# Patient Record
Sex: MALE | Race: WHITE | Employment: OTHER | URBAN - NONMETROPOLITAN AREA
[De-identification: names, ages, dates, MRNs, and addresses within clinical notes are randomized per-mention and may not be internally consistent; named-entity substitution may affect disease eponyms.]

---

## 2017-05-08 ENCOUNTER — HOSPITAL ENCOUNTER (OUTPATIENT)
Dept: NON INVASIVE DIAGNOSTICS | Age: 22
Discharge: HOME | End: 2017-05-08
Payer: MEDICAID

## 2017-05-08 LAB
EKG ATRIAL RATE: 94 BPM
EKG P AXIS: 55 DEGREES
EKG P-R INTERVAL: 150 MS
EKG Q-T INTERVAL: 358 MS
EKG QRS DURATION: 86 MS
EKG QTC CALCULATION (BAZETT): 447 MS
EKG R AXIS: 84 DEGREES
EKG T AXIS: 12 DEGREES
EKG VENTRICULAR RATE: 94 BPM

## 2017-05-08 PROCEDURE — 93005 ELECTROCARDIOGRAM TRACING: CPT

## 2017-05-12 ENCOUNTER — HOSPITAL ENCOUNTER (OUTPATIENT)
Age: 22
Setting detail: SPECIMEN
Discharge: HOME | End: 2017-05-12
Payer: MEDICAID

## 2017-05-12 LAB
ABSOLUTE EOS #: 0 K/UL (ref 0–0.4)
ABSOLUTE LYMPH #: 1.4 K/UL (ref 1–4.8)
ABSOLUTE MONO #: 0.6 K/UL (ref 0–1)
ANION GAP SERPL CALCULATED.3IONS-SCNC: 12 MMOL/L (ref 9–17)
BASOPHILS # BLD: 0 %
BASOPHILS ABSOLUTE: 0 K/UL (ref 0–0.2)
BUN BLDV-MCNC: 9 MG/DL (ref 6–20)
BUN/CREAT BLD: 8 (ref 9–20)
CALCIUM SERPL-MCNC: 9.4 MG/DL (ref 8.6–10.4)
CHLORIDE BLD-SCNC: 102 MMOL/L (ref 98–107)
CHOLESTEROL/HDL RATIO: 3.1
CHOLESTEROL: 132 MG/DL
CO2: 26 MMOL/L (ref 20–31)
CREAT SERPL-MCNC: 1.1 MG/DL (ref 0.7–1.2)
DIFFERENTIAL TYPE: NORMAL
EOSINOPHILS RELATIVE PERCENT: 0 %
GFR AFRICAN AMERICAN: >60 ML/MIN
GFR NON-AFRICAN AMERICAN: >60 ML/MIN
GFR SERPL CREATININE-BSD FRML MDRD: ABNORMAL ML/MIN/{1.73_M2}
GFR SERPL CREATININE-BSD FRML MDRD: ABNORMAL ML/MIN/{1.73_M2}
GLUCOSE BLD-MCNC: 86 MG/DL (ref 70–99)
HCT VFR BLD CALC: 41.8 % (ref 41–53)
HDLC SERPL-MCNC: 43 MG/DL
HEMOGLOBIN: 13.9 G/DL (ref 13.5–17)
LDL CHOLESTEROL: 72 MG/DL (ref 0–130)
LYMPHOCYTES # BLD: 27 %
MCH RBC QN AUTO: 28.1 PG (ref 26–34)
MCHC RBC AUTO-ENTMCNC: 33.3 G/DL (ref 31–37)
MCV RBC AUTO: 84.3 FL (ref 80–100)
MONOCYTES # BLD: 12 %
PDW BLD-RTO: 14.2 % (ref 12.1–15.2)
PLATELET # BLD: 166 K/UL (ref 140–450)
PLATELET ESTIMATE: NORMAL
PMV BLD AUTO: NORMAL FL (ref 6–12)
POTASSIUM SERPL-SCNC: 4.6 MMOL/L (ref 3.7–5.3)
RBC # BLD: 4.96 M/UL (ref 4.5–5.9)
RBC # BLD: NORMAL 10*6/UL
SEG NEUTROPHILS: 61 %
SEGMENTED NEUTROPHILS ABSOLUTE COUNT: 3.2 K/UL (ref 1.8–7.7)
SODIUM BLD-SCNC: 140 MMOL/L (ref 135–144)
TRIGL SERPL-MCNC: 86 MG/DL
VLDLC SERPL CALC-MCNC: NORMAL MG/DL (ref 1–30)
WBC # BLD: 5.2 K/UL (ref 4.5–13.5)
WBC # BLD: NORMAL 10*3/UL

## 2017-05-12 PROCEDURE — 85025 COMPLETE CBC W/AUTO DIFF WBC: CPT

## 2017-05-12 PROCEDURE — P9604 ONE-WAY ALLOW PRORATED TRIP: HCPCS

## 2017-05-12 PROCEDURE — 36415 COLL VENOUS BLD VENIPUNCTURE: CPT

## 2017-05-12 PROCEDURE — 80061 LIPID PANEL: CPT

## 2017-05-12 PROCEDURE — 80048 BASIC METABOLIC PNL TOTAL CA: CPT

## 2017-09-28 ENCOUNTER — HOSPITAL ENCOUNTER (OUTPATIENT)
Age: 22
Setting detail: SPECIMEN
Discharge: HOME | End: 2017-09-28
Payer: MEDICAID

## 2017-09-28 LAB
HEPATITIS C ANTIBODY: NONREACTIVE
HIV AG/AB: NONREACTIVE

## 2017-09-28 PROCEDURE — 87389 HIV-1 AG W/HIV-1&-2 AB AG IA: CPT

## 2017-09-28 PROCEDURE — 36415 COLL VENOUS BLD VENIPUNCTURE: CPT

## 2017-09-28 PROCEDURE — 86803 HEPATITIS C AB TEST: CPT

## 2017-09-28 PROCEDURE — P9604 ONE-WAY ALLOW PRORATED TRIP: HCPCS

## 2017-11-13 ENCOUNTER — HOSPITAL ENCOUNTER (OUTPATIENT)
Dept: NON INVASIVE DIAGNOSTICS | Age: 22
Discharge: HOME | End: 2017-11-13
Payer: MEDICAID

## 2017-11-13 LAB
EKG ATRIAL RATE: 101 BPM
EKG P AXIS: 50 DEGREES
EKG P-R INTERVAL: 152 MS
EKG Q-T INTERVAL: 354 MS
EKG QRS DURATION: 90 MS
EKG QTC CALCULATION (BAZETT): 459 MS
EKG R AXIS: 94 DEGREES
EKG T AXIS: 5 DEGREES
EKG VENTRICULAR RATE: 101 BPM

## 2017-11-13 PROCEDURE — 93005 ELECTROCARDIOGRAM TRACING: CPT

## 2018-05-04 ENCOUNTER — HOSPITAL ENCOUNTER (OUTPATIENT)
Age: 23
Setting detail: SPECIMEN
Discharge: HOME | End: 2018-05-04
Payer: MEDICAID

## 2018-05-04 LAB
ABSOLUTE EOS #: <0.03 K/UL (ref 0–0.44)
ABSOLUTE IMMATURE GRANULOCYTE: <0.03 K/UL (ref 0–0.3)
ABSOLUTE LYMPH #: 1.76 K/UL (ref 1.1–3.7)
ABSOLUTE MONO #: 1.14 K/UL (ref 0.1–1.2)
ANION GAP SERPL CALCULATED.3IONS-SCNC: 14 MMOL/L (ref 9–17)
BASOPHILS # BLD: 0 % (ref 0–2)
BASOPHILS ABSOLUTE: <0.03 K/UL (ref 0–0.2)
BUN BLDV-MCNC: 8 MG/DL (ref 6–20)
BUN/CREAT BLD: 13 (ref 9–20)
CALCIUM SERPL-MCNC: 9.8 MG/DL (ref 8.6–10.4)
CHLORIDE BLD-SCNC: 103 MMOL/L (ref 98–107)
CHOLESTEROL/HDL RATIO: 3.4
CHOLESTEROL: 149 MG/DL
CO2: 25 MMOL/L (ref 20–31)
CREAT SERPL-MCNC: 0.61 MG/DL (ref 0.7–1.2)
DIFFERENTIAL TYPE: ABNORMAL
EOSINOPHILS RELATIVE PERCENT: 0 % (ref 1–4)
GFR AFRICAN AMERICAN: >60 ML/MIN
GFR NON-AFRICAN AMERICAN: >60 ML/MIN
GFR SERPL CREATININE-BSD FRML MDRD: ABNORMAL ML/MIN/{1.73_M2}
GFR SERPL CREATININE-BSD FRML MDRD: ABNORMAL ML/MIN/{1.73_M2}
GLUCOSE BLD-MCNC: 67 MG/DL (ref 70–99)
HCT VFR BLD CALC: 41.8 % (ref 40.7–50.3)
HDLC SERPL-MCNC: 44 MG/DL
HEMOGLOBIN: 14.4 G/DL (ref 13–17)
IMMATURE GRANULOCYTES: 0 %
LDL CHOLESTEROL: 78 MG/DL (ref 0–130)
LYMPHOCYTES # BLD: 25 % (ref 24–43)
MCH RBC QN AUTO: 29.2 PG (ref 25.2–33.5)
MCHC RBC AUTO-ENTMCNC: 34.4 G/DL (ref 28.4–34.8)
MCV RBC AUTO: 84.8 FL (ref 82.6–102.9)
MONOCYTES # BLD: 16 % (ref 3–12)
NRBC AUTOMATED: 0 PER 100 WBC
PDW BLD-RTO: 13 % (ref 11.8–14.4)
PLATELET # BLD: 167 K/UL (ref 138–453)
PLATELET ESTIMATE: ABNORMAL
PMV BLD AUTO: 10.7 FL (ref 8.1–13.5)
POTASSIUM SERPL-SCNC: 4.2 MMOL/L (ref 3.7–5.3)
RBC # BLD: 4.93 M/UL (ref 4.21–5.77)
RBC # BLD: ABNORMAL 10*6/UL
SEG NEUTROPHILS: 59 % (ref 36–65)
SEGMENTED NEUTROPHILS ABSOLUTE COUNT: 4.07 K/UL (ref 1.5–8.1)
SODIUM BLD-SCNC: 142 MMOL/L (ref 135–144)
TRIGL SERPL-MCNC: 137 MG/DL
VLDLC SERPL CALC-MCNC: NORMAL MG/DL (ref 1–30)
WBC # BLD: 7 K/UL (ref 3.5–11.3)
WBC # BLD: ABNORMAL 10*3/UL

## 2018-05-04 PROCEDURE — 80048 BASIC METABOLIC PNL TOTAL CA: CPT

## 2018-05-04 PROCEDURE — 36415 COLL VENOUS BLD VENIPUNCTURE: CPT

## 2018-05-04 PROCEDURE — 80061 LIPID PANEL: CPT

## 2018-05-04 PROCEDURE — 85025 COMPLETE CBC W/AUTO DIFF WBC: CPT

## 2018-05-04 PROCEDURE — P9603 ONE-WAY ALLOW PRORATED MILES: HCPCS

## 2018-06-11 ENCOUNTER — HOSPITAL ENCOUNTER (OUTPATIENT)
Dept: NON INVASIVE DIAGNOSTICS | Age: 23
Discharge: HOME | End: 2018-06-11
Payer: MEDICAID

## 2018-06-11 LAB
EKG ATRIAL RATE: 87 BPM
EKG P AXIS: 68 DEGREES
EKG P-R INTERVAL: 160 MS
EKG Q-T INTERVAL: 354 MS
EKG QRS DURATION: 94 MS
EKG QTC CALCULATION (BAZETT): 425 MS
EKG R AXIS: 95 DEGREES
EKG T AXIS: 40 DEGREES
EKG VENTRICULAR RATE: 87 BPM

## 2018-06-11 PROCEDURE — 93005 ELECTROCARDIOGRAM TRACING: CPT

## 2019-04-30 ENCOUNTER — HOSPITAL ENCOUNTER (OUTPATIENT)
Age: 24
Setting detail: SPECIMEN
Discharge: HOME | End: 2019-04-30
Payer: MEDICAID

## 2019-04-30 LAB — RUBV IGG SER QL: 279 IU/ML

## 2019-04-30 PROCEDURE — P9603 ONE-WAY ALLOW PRORATED MILES: HCPCS

## 2019-04-30 PROCEDURE — 86735 MUMPS ANTIBODY: CPT

## 2019-04-30 PROCEDURE — 86762 RUBELLA ANTIBODY: CPT

## 2019-04-30 PROCEDURE — 36415 COLL VENOUS BLD VENIPUNCTURE: CPT

## 2019-04-30 PROCEDURE — 86765 RUBEOLA ANTIBODY: CPT

## 2019-05-01 LAB
MEASLES IMMUNE (IGG): 3.47
MUV IGG SER QL: 3.14

## 2019-05-09 ENCOUNTER — HOSPITAL ENCOUNTER (OUTPATIENT)
Age: 24
Setting detail: SPECIMEN
Discharge: HOME | End: 2019-05-09
Payer: MEDICARE

## 2019-05-09 LAB
ABSOLUTE EOS #: <0.03 K/UL (ref 0–0.44)
ABSOLUTE IMMATURE GRANULOCYTE: <0.03 K/UL (ref 0–0.3)
ABSOLUTE LYMPH #: 1.68 K/UL (ref 1.1–3.7)
ABSOLUTE MONO #: 0.69 K/UL (ref 0.1–1.2)
ANION GAP SERPL CALCULATED.3IONS-SCNC: 12 MMOL/L (ref 9–17)
BASOPHILS # BLD: 0 % (ref 0–2)
BASOPHILS ABSOLUTE: <0.03 K/UL (ref 0–0.2)
BUN BLDV-MCNC: 8 MG/DL (ref 6–20)
BUN/CREAT BLD: 9 (ref 9–20)
CALCIUM SERPL-MCNC: 9.4 MG/DL (ref 8.6–10.4)
CHLORIDE BLD-SCNC: 103 MMOL/L (ref 98–107)
CHOLESTEROL/HDL RATIO: 3.6
CHOLESTEROL: 118 MG/DL
CO2: 26 MMOL/L (ref 20–31)
CREAT SERPL-MCNC: 0.89 MG/DL (ref 0.7–1.2)
DIFFERENTIAL TYPE: ABNORMAL
EOSINOPHILS RELATIVE PERCENT: 0 % (ref 1–4)
GFR AFRICAN AMERICAN: >60 ML/MIN
GFR NON-AFRICAN AMERICAN: >60 ML/MIN
GFR SERPL CREATININE-BSD FRML MDRD: NORMAL ML/MIN/{1.73_M2}
GFR SERPL CREATININE-BSD FRML MDRD: NORMAL ML/MIN/{1.73_M2}
GLUCOSE BLD-MCNC: 83 MG/DL (ref 70–99)
HCT VFR BLD CALC: 40.7 % (ref 40.7–50.3)
HDLC SERPL-MCNC: 33 MG/DL
HEMOGLOBIN: 13.8 G/DL (ref 13–17)
IMMATURE GRANULOCYTES: 0 %
LDL CHOLESTEROL: 59 MG/DL (ref 0–130)
LYMPHOCYTES # BLD: 30 % (ref 24–43)
MCH RBC QN AUTO: 28.9 PG (ref 25.2–33.5)
MCHC RBC AUTO-ENTMCNC: 33.9 G/DL (ref 28.4–34.8)
MCV RBC AUTO: 85.3 FL (ref 82.6–102.9)
MONOCYTES # BLD: 12 % (ref 3–12)
NRBC AUTOMATED: 0 PER 100 WBC
PDW BLD-RTO: 13.1 % (ref 11.8–14.4)
PLATELET # BLD: 168 K/UL (ref 138–453)
PLATELET ESTIMATE: ABNORMAL
PMV BLD AUTO: 11 FL (ref 8.1–13.5)
POTASSIUM SERPL-SCNC: 4.2 MMOL/L (ref 3.7–5.3)
RBC # BLD: 4.77 M/UL (ref 4.21–5.77)
RBC # BLD: ABNORMAL 10*6/UL
SEG NEUTROPHILS: 58 % (ref 36–65)
SEGMENTED NEUTROPHILS ABSOLUTE COUNT: 3.25 K/UL (ref 1.5–8.1)
SODIUM BLD-SCNC: 141 MMOL/L (ref 135–144)
TRIGL SERPL-MCNC: 132 MG/DL
VLDLC SERPL CALC-MCNC: ABNORMAL MG/DL (ref 1–30)
WBC # BLD: 5.6 K/UL (ref 3.5–11.3)
WBC # BLD: ABNORMAL 10*3/UL

## 2019-05-09 PROCEDURE — 85025 COMPLETE CBC W/AUTO DIFF WBC: CPT

## 2019-05-09 PROCEDURE — P9603 ONE-WAY ALLOW PRORATED MILES: HCPCS

## 2019-05-09 PROCEDURE — 80048 BASIC METABOLIC PNL TOTAL CA: CPT

## 2019-05-09 PROCEDURE — 36415 COLL VENOUS BLD VENIPUNCTURE: CPT

## 2019-05-09 PROCEDURE — 80061 LIPID PANEL: CPT

## 2019-05-13 ENCOUNTER — HOSPITAL ENCOUNTER (OUTPATIENT)
Dept: NON INVASIVE DIAGNOSTICS | Age: 24
Discharge: HOME | End: 2019-05-13
Payer: MEDICARE

## 2019-05-13 PROCEDURE — 93005 ELECTROCARDIOGRAM TRACING: CPT

## 2019-05-14 LAB
EKG ATRIAL RATE: 72 BPM
EKG P AXIS: 54 DEGREES
EKG P-R INTERVAL: 168 MS
EKG Q-T INTERVAL: 380 MS
EKG QRS DURATION: 94 MS
EKG QTC CALCULATION (BAZETT): 416 MS
EKG R AXIS: 84 DEGREES
EKG T AXIS: 26 DEGREES
EKG VENTRICULAR RATE: 72 BPM

## 2019-08-01 ENCOUNTER — HOSPITAL ENCOUNTER (OUTPATIENT)
Age: 24
Setting detail: SPECIMEN
Discharge: HOME | End: 2019-08-01
Payer: MEDICARE

## 2019-08-01 LAB — HIV AG/AB: NONREACTIVE

## 2019-08-01 PROCEDURE — 87389 HIV-1 AG W/HIV-1&-2 AB AG IA: CPT

## 2019-08-01 PROCEDURE — P9603 ONE-WAY ALLOW PRORATED MILES: HCPCS

## 2019-08-01 PROCEDURE — 86803 HEPATITIS C AB TEST: CPT

## 2019-08-01 PROCEDURE — 36415 COLL VENOUS BLD VENIPUNCTURE: CPT

## 2019-08-02 LAB — HEPATITIS C ANTIBODY: NONREACTIVE

## 2019-11-18 ENCOUNTER — HOSPITAL ENCOUNTER (OUTPATIENT)
Dept: NON INVASIVE DIAGNOSTICS | Age: 24
Discharge: HOME | End: 2019-11-18
Payer: MEDICARE

## 2019-11-18 LAB
EKG ATRIAL RATE: 93 BPM
EKG P AXIS: 41 DEGREES
EKG P-R INTERVAL: 152 MS
EKG Q-T INTERVAL: 352 MS
EKG QRS DURATION: 92 MS
EKG QTC CALCULATION (BAZETT): 437 MS
EKG R AXIS: 74 DEGREES
EKG T AXIS: 24 DEGREES
EKG VENTRICULAR RATE: 93 BPM

## 2019-11-18 PROCEDURE — 93005 ELECTROCARDIOGRAM TRACING: CPT | Performed by: FAMILY MEDICINE

## 2019-11-18 PROCEDURE — 93010 ELECTROCARDIOGRAM REPORT: CPT | Performed by: INTERNAL MEDICINE

## 2020-05-22 ENCOUNTER — HOSPITAL ENCOUNTER (OUTPATIENT)
Age: 25
Setting detail: SPECIMEN
Discharge: HOME | End: 2020-05-22
Payer: MEDICARE

## 2020-05-22 LAB
ABSOLUTE EOS #: <0.03 K/UL (ref 0–0.44)
ABSOLUTE IMMATURE GRANULOCYTE: <0.03 K/UL (ref 0–0.3)
ABSOLUTE LYMPH #: 1.34 K/UL (ref 1.1–3.7)
ABSOLUTE MONO #: 0.45 K/UL (ref 0.1–1.2)
ANION GAP SERPL CALCULATED.3IONS-SCNC: 15 MMOL/L (ref 9–17)
BASOPHILS # BLD: 0 % (ref 0–2)
BASOPHILS ABSOLUTE: <0.03 K/UL (ref 0–0.2)
BUN BLDV-MCNC: 9 MG/DL (ref 6–20)
BUN/CREAT BLD: 12 (ref 9–20)
CALCIUM SERPL-MCNC: 9.3 MG/DL (ref 8.6–10.4)
CHLORIDE BLD-SCNC: 104 MMOL/L (ref 98–107)
CHOLESTEROL/HDL RATIO: 3.5
CHOLESTEROL: 138 MG/DL
CO2: 22 MMOL/L (ref 20–31)
CREAT SERPL-MCNC: 0.74 MG/DL (ref 0.7–1.2)
DIFFERENTIAL TYPE: ABNORMAL
EOSINOPHILS RELATIVE PERCENT: 0 % (ref 1–4)
GFR AFRICAN AMERICAN: >60 ML/MIN
GFR NON-AFRICAN AMERICAN: >60 ML/MIN
GFR SERPL CREATININE-BSD FRML MDRD: ABNORMAL ML/MIN/{1.73_M2}
GFR SERPL CREATININE-BSD FRML MDRD: ABNORMAL ML/MIN/{1.73_M2}
GLUCOSE BLD-MCNC: 125 MG/DL (ref 70–99)
HCT VFR BLD CALC: 42.1 % (ref 40.7–50.3)
HDLC SERPL-MCNC: 40 MG/DL
HEMOGLOBIN: 13.6 G/DL (ref 13–17)
IMMATURE GRANULOCYTES: 0 %
LDL CHOLESTEROL: 73 MG/DL (ref 0–130)
LYMPHOCYTES # BLD: 25 % (ref 24–43)
MCH RBC QN AUTO: 28.6 PG (ref 25.2–33.5)
MCHC RBC AUTO-ENTMCNC: 32.3 G/DL (ref 28.4–34.8)
MCV RBC AUTO: 88.4 FL (ref 82.6–102.9)
MONOCYTES # BLD: 9 % (ref 3–12)
NRBC AUTOMATED: 0 PER 100 WBC
PDW BLD-RTO: 13.2 % (ref 11.8–14.4)
PLATELET # BLD: 165 K/UL (ref 138–453)
PLATELET ESTIMATE: ABNORMAL
PMV BLD AUTO: 10.2 FL (ref 8.1–13.5)
POTASSIUM SERPL-SCNC: 4 MMOL/L (ref 3.7–5.3)
RBC # BLD: 4.76 M/UL (ref 4.21–5.77)
RBC # BLD: ABNORMAL 10*6/UL
SEG NEUTROPHILS: 66 % (ref 36–65)
SEGMENTED NEUTROPHILS ABSOLUTE COUNT: 3.47 K/UL (ref 1.5–8.1)
SODIUM BLD-SCNC: 141 MMOL/L (ref 135–144)
TRIGL SERPL-MCNC: 126 MG/DL
VLDLC SERPL CALC-MCNC: ABNORMAL MG/DL (ref 1–30)
WBC # BLD: 5.3 K/UL (ref 3.5–11.3)
WBC # BLD: ABNORMAL 10*3/UL

## 2020-05-22 PROCEDURE — 80048 BASIC METABOLIC PNL TOTAL CA: CPT

## 2020-05-22 PROCEDURE — 85025 COMPLETE CBC W/AUTO DIFF WBC: CPT

## 2020-05-22 PROCEDURE — 80061 LIPID PANEL: CPT

## 2020-05-22 PROCEDURE — 36415 COLL VENOUS BLD VENIPUNCTURE: CPT

## 2020-05-22 PROCEDURE — P9603 ONE-WAY ALLOW PRORATED MILES: HCPCS

## 2020-05-28 ENCOUNTER — HOSPITAL ENCOUNTER (OUTPATIENT)
Age: 25
Setting detail: SPECIMEN
Discharge: HOME | End: 2020-05-28
Payer: MEDICARE

## 2020-05-28 LAB
ESTIMATED AVERAGE GLUCOSE: <82 MG/DL
HBA1C MFR BLD: <4.5 % (ref 4.8–5.9)

## 2020-05-28 PROCEDURE — P9604 ONE-WAY ALLOW PRORATED TRIP: HCPCS

## 2020-05-28 PROCEDURE — 36415 COLL VENOUS BLD VENIPUNCTURE: CPT

## 2020-05-28 PROCEDURE — 83036 HEMOGLOBIN GLYCOSYLATED A1C: CPT

## 2020-08-18 ENCOUNTER — HOSPITAL ENCOUNTER (EMERGENCY)
Age: 25
Discharge: HOME | End: 2020-08-18
Attending: EMERGENCY MEDICINE
Payer: MEDICARE

## 2020-08-18 ENCOUNTER — APPOINTMENT (OUTPATIENT)
Dept: GENERAL RADIOLOGY | Age: 25
End: 2020-08-18
Payer: MEDICARE

## 2020-08-18 VITALS
TEMPERATURE: 97.9 F | SYSTOLIC BLOOD PRESSURE: 144 MMHG | HEART RATE: 96 BPM | DIASTOLIC BLOOD PRESSURE: 91 MMHG | RESPIRATION RATE: 14 BRPM | WEIGHT: 204 LBS | BODY MASS INDEX: 31.95 KG/M2 | OXYGEN SATURATION: 97 %

## 2020-08-18 PROCEDURE — 6370000000 HC RX 637 (ALT 250 FOR IP): Performed by: EMERGENCY MEDICINE

## 2020-08-18 PROCEDURE — 99283 EMERGENCY DEPT VISIT LOW MDM: CPT

## 2020-08-18 PROCEDURE — 73610 X-RAY EXAM OF ANKLE: CPT

## 2020-08-18 RX ORDER — IBUPROFEN 800 MG/1
800 TABLET ORAL EVERY 8 HOURS PRN
Qty: 21 TABLET | Refills: 0 | Status: SHIPPED | OUTPATIENT
Start: 2020-08-18

## 2020-08-18 RX ORDER — IBUPROFEN 800 MG/1
800 TABLET ORAL ONCE
Status: COMPLETED | OUTPATIENT
Start: 2020-08-18 | End: 2020-08-18

## 2020-08-18 RX ORDER — LACOSAMIDE 50 MG/1
150 TABLET ORAL 2 TIMES DAILY
COMMUNITY

## 2020-08-18 RX ADMIN — IBUPROFEN 800 MG: 800 TABLET, FILM COATED ORAL at 18:24

## 2020-08-18 ASSESSMENT — ENCOUNTER SYMPTOMS
VOMITING: 0
ABDOMINAL DISTENTION: 0
WHEEZING: 0
SORE THROAT: 0
COLOR CHANGE: 1
NAUSEA: 0
SHORTNESS OF BREATH: 0
RHINORRHEA: 0
CONSTIPATION: 0
COUGH: 0
DIARRHEA: 0

## 2020-08-18 NOTE — ED NOTES
Writer speaking Mother/POA results reviewed and all questions answered at this time.       Yohan Treviño RN  08/18/20 15 Leanna Moya RN  08/18/20 [id number]

## 2020-08-18 NOTE — FLOWSHEET NOTE
Patient appears to be resting comfortably in ED bed.  Does not appear to grimace with passive movement of RLE

## 2020-08-18 NOTE — ED NOTES
Air cast applied. Patient tolerated well. Home care reviewed with [facility] staff.       Mariza Meléndez RN  08/18/20 [id number]

## 2020-08-18 NOTE — ED PROVIDER NOTES
[facility] ED  Emergency Department        Pt Name: Jaquan Lala  MRN: [id number]  [place] 1995  Date of evaluation: 8/18/20    CHIEF COMPLAINT       Chief Complaint   Patient presents with    Ankle Injury     Patient non-verbal. Staff from [facility] at bedside state facility nurse noticed patient was limping, had a swollen right ankle. No fall/injury witnessed       HISTORY OF PRESENT ILLNESS  (Location/Symptom, Timing/Onset, Context/Setting, Quality, Duration, ModifyingFactors, Severity.)      Jaquan Lala is a 25 y.o. male who presents with swelling to right ankle that staff noticed today, patient is non verbal, patient was also noted to be limping, no reported or witnessed trauma. Patient no known history of inflammatory joint diseases, no fevers, typically combative. He does sit majority of time cross legged. PAST MEDICAL / SURGICAL / SOCIAL / FAMILY HISTORY      has a past medical history of Asthma, Autism disorder, Bipolar disorder ([address] Collar bone fracture, Constipation, Depressive disorder, GERD (gastroesophageal reflux disease), Insomnia, Non-verbal learning disorder, Seizures ([address] and Urinary frequency. has no past surgical history on file.        Social History     Socioeconomic History    Marital status: Single     Spouse name: Not on file    Number of children: Not on file    Years of education: Not on file    Highest education level: Not on file   Occupational History    Not on file   Social Needs    Financial resource strain: Not on file    Food insecurity     Worry: Not on file     Inability: Not on file    Transportation needs     Medical: Not on file     Non-medical: Not on file   Tobacco Use    Smoking status: Never Smoker    Smokeless tobacco: Never Used   Substance and Sexual Activity    Alcohol use: No    Drug use: Not on file    Sexual activity: Not on file   Lifestyle    Physical activity     Days per week: Not on file     Minutes per session: Not on (PRILOSEC) 20 MG capsule Take 20 mg by mouth Daily   Yes Historical Provider, MD   oxybutynin (DITROPAN-XL) 10 MG CR tablet Take 10 mg by mouth nightly   Yes Historical Provider, MD   propranolol (INDERAL) 20 MG tablet Take 20 mg by mouth 2 times daily   Yes Historical Provider, MD   temazepam (RESTORIL) 30 MG capsule Take 30 mg by mouth nightly   Yes Historical Provider, MD   Multiple Vitamins-Minerals (THERAPEUTIC MULTIVITAMIN-MINERALS) tablet Take 1 tablet by mouth daily   Yes Historical Provider, MD   magnesium hydroxide (MILK OF MAGNESIA) 400 MG/5ML suspension Take 30 mLs by mouth every other day   Yes Historical Provider, MD   MINERAL OIL PO Take 15 mLs by mouth nightly    Historical Provider, MD   acetaminophen (TYLENOL) 325 MG tablet Take 650 mg by mouth every 4 hours as needed for Pain    Historical Provider, MD   diazepam (DIASTAT) 10 MG GEL Place 10 mg rectally as needed As needed for seizures    Historical Provider, MD   haloperidol (HALDOL) 1 MG tablet Take 1 mg by mouth every 4 hours as needed for Agitation Max 8 mg in 24 hours    Historical Provider, MD   LORazepam (ATIVAN) 1 MG tablet Take 1 mg by mouth every 6 hours as needed for Anxiety Max 4mg in 24 hours    Historical Provider, MD   albuterol sulfate  (90 BASE) MCG/ACT inhaler Inhale 2 puffs into the lungs every 4 hours as needed for Wheezing    Historical Provider, MD       REVIEW OF SYSTEMS    (2-9 systems for level 4, 10 or more for level 5)      Review of Systems   Constitutional: Positive for appetite change. Negative for activity change, fatigue and fever. HENT: Negative for congestion, rhinorrhea and sore throat. Respiratory: Negative for cough, shortness of breath and wheezing. Cardiovascular: Negative for chest pain, palpitations and leg swelling. Gastrointestinal: Negative for abdominal distention, constipation, diarrhea, nausea and vomiting. Genitourinary: Negative for decreased urine volume and dysuria. Musculoskeletal: Positive for arthralgias, gait problem, joint swelling and myalgias. Skin: Positive for color change. Negative for rash. Neurological: Negative for dizziness, weakness, light-headedness, numbness and headaches. ROs per care givers    PHYSICAL EXAM   (up to 7 for level 4, 8 or more for level 5)     INITIAL VITALS:   BP (!) 144/91   Pulse 96   Temp 97.9 °F (36.6 °C) (Temporal)   Resp 14   Wt 204 lb (92.5 kg)   SpO2 97%   BMI 31.95 kg/m²     Physical Exam  Vitals signs and nursing note reviewed. Constitutional:       Comments: Nontoxic appearing, answering all questions appropriately no signs of distress   HENT:      Head: Normocephalic and atraumatic. Eyes:      General:         Right eye: No discharge. Left eye: No discharge. Conjunctiva/sclera: Conjunctivae normal.   Cardiovascular:      Rate and Rhythm: Normal rate and regular rhythm. Heart sounds: Normal heart sounds. No murmur. No friction rub. No gallop. Pulmonary:      Effort: Pulmonary effort is normal. No respiratory distress. Breath sounds: Normal breath sounds. No wheezing or rales. Chest:      Chest wall: No tenderness. Abdominal:      General: There is no distension. Palpations: Abdomen is soft. There is no mass. Tenderness: There is no abdominal tenderness. There is no guarding or rebound. Musculoskeletal:      Comments: Edema and erythema noted over the lateral malleolus. And patient exhibits tenderness upon palpation of this area. He also exhibits pain with dorsiflexion, no pain with plantar flexion. He does have a 2+ pedal pulse as well as capillary refill of less than 2 seconds there is no pain with palpation over his metatarsal or bones or his medial malleolus. No pain to his proximal fibular head he is able to flex and extend at the knee without pain or decreased range of motion. He does have 5 out of 5 motor strength. Skin:     General: Skin is warm and dry. Findings: No rash. Neurological:      Mental Status: He is alert and oriented to person, place, and time. DIFFERENTIAL  DIAGNOSIS     Lateral malleolus swelling. Patient does have some calluses noted along the lateral aspect as well as lateral malleolus as he constantly is sitting in a crosslegged position the area over his lateral mall appears more edematous and concern for possible trauma. No history of inflammatory joint diseases like gout. Will treat with NSAIDs ice, and plan on x-ray imaging at this time he is able to plantar and dorsiflex, is afebrile and overall well-appearing low concern for septic joint    PLAN (LABS / IMAGING / EKG):  Orders Placed This Encounter   Procedures    XR ANKLE RIGHT (MIN 3 VIEWS)    Apply ice    Elevate extremity    Air Cast       MEDICATIONS ORDERED:  Orders Placed This Encounter   Medications    ibuprofen (ADVIL;MOTRIN) tablet 800 mg       DIAGNOSTIC RESULTS / EMERGENCY DEPARTMENT COURSE / MDM     LABS:  No results found for this visit on 08/18/20. IMPRESSION: ankle sprain    RADIOLOGY:  XR ANKLE RIGHT (MIN 3 VIEWS)   Final Result   1. Moderate soft tissue swelling of the anterior and lateral ankle. 2. No acute fracture or dislocation. EMERGENCY DEPARTMENT COURSE:  Plan for air splint, pcp follow up, ice as he tolerates, and elevation, and NSAIDS    FINAL IMPRESSION      1.  Sprain of right ankle, unspecified ligament, initial encounter          DISPOSITION / PLAN     DISPOSITION Decision To Discharge 08/18/2020 06:21:42 PM      PATIENT REFERRED TO:  Angelique [address]ques [address], MD  [facility]  [phone]    Schedule an appointment as soon as possible for a visit in 2 days        DISCHARGE MEDICATIONS:  New Prescriptions    No medications on file       Aisha Sanchez  6:23 PM    Attending Emergency Physician  [facility] FACILITY ED    (Please note that portions of this note were completed with a voice

## 2020-08-18 NOTE — ED NOTES
Bed: 10  Expected date:   Expected time:   Means of arrival:   Comments:  mike pt     Lisbeth Nunez RN  08/18/20 [id number]

## 2020-09-18 ENCOUNTER — HOSPITAL ENCOUNTER (OUTPATIENT)
Age: 25
Setting detail: SPECIMEN
Discharge: HOME | End: 2020-09-18
Payer: MEDICARE

## 2020-09-18 LAB
ABSOLUTE EOS #: <0.03 K/UL (ref 0–0.44)
ABSOLUTE IMMATURE GRANULOCYTE: <0.03 K/UL (ref 0–0.3)
ABSOLUTE LYMPH #: 1.64 K/UL (ref 1.1–3.7)
ABSOLUTE MONO #: 0.62 K/UL (ref 0.1–1.2)
BASOPHILS # BLD: 0 % (ref 0–2)
BASOPHILS ABSOLUTE: <0.03 K/UL (ref 0–0.2)
DIFFERENTIAL TYPE: ABNORMAL
EOSINOPHILS RELATIVE PERCENT: 0 % (ref 1–4)
HCT VFR BLD CALC: 39.7 % (ref 40.7–50.3)
HEMOGLOBIN: 13.7 G/DL (ref 13–17)
IMMATURE GRANULOCYTES: 0 %
LYMPHOCYTES # BLD: 32 % (ref 24–43)
MCH RBC QN AUTO: 29.1 PG (ref 25.2–33.5)
MCHC RBC AUTO-ENTMCNC: 34.5 G/DL (ref 28.4–34.8)
MCV RBC AUTO: 84.3 FL (ref 82.6–102.9)
MONOCYTES # BLD: 12 % (ref 3–12)
NRBC AUTOMATED: 0 PER 100 WBC
PDW BLD-RTO: 12.8 % (ref 11.8–14.4)
PLATELET # BLD: 163 K/UL (ref 138–453)
PLATELET ESTIMATE: ABNORMAL
PMV BLD AUTO: 10.3 FL (ref 8.1–13.5)
RBC # BLD: 4.71 M/UL (ref 4.21–5.77)
RBC # BLD: ABNORMAL 10*6/UL
SEG NEUTROPHILS: 56 % (ref 36–65)
SEGMENTED NEUTROPHILS ABSOLUTE COUNT: 2.93 K/UL (ref 1.5–8.1)
WBC # BLD: 5.2 K/UL (ref 3.5–11.3)
WBC # BLD: ABNORMAL 10*3/UL

## 2020-09-18 PROCEDURE — 85025 COMPLETE CBC W/AUTO DIFF WBC: CPT

## 2020-12-28 ENCOUNTER — HOSPITAL ENCOUNTER (OUTPATIENT)
Dept: NON INVASIVE DIAGNOSTICS | Age: 25
Discharge: HOME | End: 2020-12-28
Payer: MEDICARE

## 2020-12-28 LAB
EKG ATRIAL RATE: 121 BPM
EKG P AXIS: 44 DEGREES
EKG P-R INTERVAL: 148 MS
EKG Q-T INTERVAL: 326 MS
EKG QRS DURATION: 86 MS
EKG QTC CALCULATION (BAZETT): 462 MS
EKG R AXIS: 106 DEGREES
EKG T AXIS: 14 DEGREES
EKG VENTRICULAR RATE: 121 BPM

## 2020-12-28 PROCEDURE — 93010 ELECTROCARDIOGRAM REPORT: CPT | Performed by: FAMILY MEDICINE

## 2020-12-28 PROCEDURE — 93005 ELECTROCARDIOGRAM TRACING: CPT | Performed by: FAMILY MEDICINE

## 2021-02-18 ENCOUNTER — HOSPITAL ENCOUNTER (OUTPATIENT)
Age: 26
Setting detail: SPECIMEN
Discharge: HOME | End: 2021-02-18
Payer: MEDICARE

## 2021-02-18 LAB
HEPATITIS C ANTIBODY: NONREACTIVE
HIV AG/AB: NONREACTIVE

## 2021-02-18 PROCEDURE — 87389 HIV-1 AG W/HIV-1&-2 AB AG IA: CPT

## 2021-02-18 PROCEDURE — 36415 COLL VENOUS BLD VENIPUNCTURE: CPT

## 2021-02-18 PROCEDURE — 86803 HEPATITIS C AB TEST: CPT

## 2021-02-18 PROCEDURE — P9603 ONE-WAY ALLOW PRORATED MILES: HCPCS

## 2021-05-14 ENCOUNTER — HOSPITAL ENCOUNTER (OUTPATIENT)
Age: 26
Setting detail: SPECIMEN
Discharge: HOME | End: 2021-05-14
Payer: MEDICARE

## 2021-05-14 LAB
ABSOLUTE EOS #: <0.03 K/UL (ref 0–0.44)
ABSOLUTE IMMATURE GRANULOCYTE: <0.03 K/UL (ref 0–0.3)
ABSOLUTE LYMPH #: 1.4 K/UL (ref 1.1–3.7)
ABSOLUTE MONO #: 0.78 K/UL (ref 0.1–1.2)
ANION GAP SERPL CALCULATED.3IONS-SCNC: 9 MMOL/L (ref 9–17)
BASOPHILS # BLD: 0 % (ref 0–2)
BASOPHILS ABSOLUTE: <0.03 K/UL (ref 0–0.2)
BUN BLDV-MCNC: 8 MG/DL (ref 6–20)
BUN/CREAT BLD: 10 (ref 9–20)
CALCIUM SERPL-MCNC: 9.5 MG/DL (ref 8.6–10.4)
CHLORIDE BLD-SCNC: 103 MMOL/L (ref 98–107)
CHOLESTEROL/HDL RATIO: 3.6
CHOLESTEROL: 133 MG/DL
CO2: 26 MMOL/L (ref 20–31)
CREAT SERPL-MCNC: 0.79 MG/DL (ref 0.7–1.2)
DIFFERENTIAL TYPE: ABNORMAL
EOSINOPHILS RELATIVE PERCENT: 0 % (ref 1–4)
GFR AFRICAN AMERICAN: >60 ML/MIN
GFR NON-AFRICAN AMERICAN: >60 ML/MIN
GFR SERPL CREATININE-BSD FRML MDRD: NORMAL ML/MIN/{1.73_M2}
GFR SERPL CREATININE-BSD FRML MDRD: NORMAL ML/MIN/{1.73_M2}
GLUCOSE BLD-MCNC: 92 MG/DL (ref 70–99)
HCT VFR BLD CALC: 39.7 % (ref 40.7–50.3)
HDLC SERPL-MCNC: 37 MG/DL
HEMOGLOBIN: 13.5 G/DL (ref 13–17)
IMMATURE GRANULOCYTES: 0 %
LDL CHOLESTEROL: 77 MG/DL (ref 0–130)
LYMPHOCYTES # BLD: 22 % (ref 24–43)
MCH RBC QN AUTO: 29 PG (ref 25.2–33.5)
MCHC RBC AUTO-ENTMCNC: 34 G/DL (ref 28.4–34.8)
MCV RBC AUTO: 85.4 FL (ref 82.6–102.9)
MONOCYTES # BLD: 12 % (ref 3–12)
NRBC AUTOMATED: 0 PER 100 WBC
PDW BLD-RTO: 13.2 % (ref 11.8–14.4)
PLATELET # BLD: 153 K/UL (ref 138–453)
PLATELET ESTIMATE: ABNORMAL
PMV BLD AUTO: 10.3 FL (ref 8.1–13.5)
POTASSIUM SERPL-SCNC: 3.9 MMOL/L (ref 3.7–5.3)
RBC # BLD: 4.65 M/UL (ref 4.21–5.77)
RBC # BLD: ABNORMAL 10*6/UL
SEG NEUTROPHILS: 66 % (ref 36–65)
SEGMENTED NEUTROPHILS ABSOLUTE COUNT: 4.31 K/UL (ref 1.5–8.1)
SODIUM BLD-SCNC: 138 MMOL/L (ref 135–144)
TRIGL SERPL-MCNC: 97 MG/DL
VLDLC SERPL CALC-MCNC: ABNORMAL MG/DL (ref 1–30)
WBC # BLD: 6.5 K/UL (ref 3.5–11.3)
WBC # BLD: ABNORMAL 10*3/UL

## 2021-05-14 PROCEDURE — P9603 ONE-WAY ALLOW PRORATED MILES: HCPCS

## 2021-05-14 PROCEDURE — 36415 COLL VENOUS BLD VENIPUNCTURE: CPT

## 2021-05-14 PROCEDURE — 80048 BASIC METABOLIC PNL TOTAL CA: CPT

## 2021-05-14 PROCEDURE — 80061 LIPID PANEL: CPT

## 2021-05-14 PROCEDURE — 85025 COMPLETE CBC W/AUTO DIFF WBC: CPT

## 2021-07-12 ENCOUNTER — HOSPITAL ENCOUNTER (OUTPATIENT)
Age: 26
Discharge: HOME | End: 2021-07-12
Payer: MEDICARE

## 2021-07-12 LAB
EKG ATRIAL RATE: 124 BPM
EKG P AXIS: 71 DEGREES
EKG P-R INTERVAL: 148 MS
EKG Q-T INTERVAL: 312 MS
EKG QRS DURATION: 88 MS
EKG QTC CALCULATION (BAZETT): 448 MS
EKG R AXIS: 79 DEGREES
EKG T AXIS: 39 DEGREES
EKG VENTRICULAR RATE: 124 BPM

## 2021-07-12 PROCEDURE — 93010 ELECTROCARDIOGRAM REPORT: CPT | Performed by: INTERNAL MEDICINE

## 2021-07-12 PROCEDURE — 93005 ELECTROCARDIOGRAM TRACING: CPT | Performed by: FAMILY MEDICINE

## 2022-01-03 ENCOUNTER — HOSPITAL ENCOUNTER (OUTPATIENT)
Age: 27
Discharge: HOME | End: 2022-01-03
Payer: MEDICARE

## 2022-01-03 LAB
EKG ATRIAL RATE: 134 BPM
EKG P AXIS: 65 DEGREES
EKG P-R INTERVAL: 144 MS
EKG Q-T INTERVAL: 288 MS
EKG QRS DURATION: 84 MS
EKG QTC CALCULATION (BAZETT): 430 MS
EKG R AXIS: 88 DEGREES
EKG T AXIS: 30 DEGREES
EKG VENTRICULAR RATE: 134 BPM

## 2022-01-03 PROCEDURE — 93005 ELECTROCARDIOGRAM TRACING: CPT | Performed by: FAMILY MEDICINE

## 2022-01-03 PROCEDURE — 93010 ELECTROCARDIOGRAM REPORT: CPT | Performed by: INTERNAL MEDICINE

## 2022-01-04 ENCOUNTER — HOSPITAL ENCOUNTER (OUTPATIENT)
Age: 27
Setting detail: SPECIMEN
Discharge: HOME | End: 2022-01-04
Payer: MEDICARE

## 2022-01-04 LAB — HEPATITIS C ANTIBODY: NONREACTIVE

## 2022-01-04 PROCEDURE — 86803 HEPATITIS C AB TEST: CPT

## 2022-01-04 PROCEDURE — P9604 ONE-WAY ALLOW PRORATED TRIP: HCPCS

## 2022-01-04 PROCEDURE — 36415 COLL VENOUS BLD VENIPUNCTURE: CPT

## 2022-05-06 ENCOUNTER — HOSPITAL ENCOUNTER (OUTPATIENT)
Age: 27
Setting detail: SPECIMEN
Discharge: HOME | End: 2022-05-06
Payer: MEDICARE

## 2022-05-06 LAB
ABSOLUTE EOS #: <0.03 K/UL (ref 0–0.44)
ABSOLUTE IMMATURE GRANULOCYTE: <0.03 K/UL (ref 0–0.3)
ABSOLUTE LYMPH #: 1.42 K/UL (ref 1.1–3.7)
ABSOLUTE MONO #: 0.55 K/UL (ref 0.1–1.2)
ANION GAP SERPL CALCULATED.3IONS-SCNC: 9 MMOL/L (ref 9–17)
BASOPHILS # BLD: 0 % (ref 0–2)
BASOPHILS ABSOLUTE: <0.03 K/UL (ref 0–0.2)
BUN BLDV-MCNC: 10 MG/DL (ref 6–20)
BUN/CREAT BLD: 12 (ref 9–20)
CALCIUM SERPL-MCNC: 9.4 MG/DL (ref 8.6–10.4)
CHLORIDE BLD-SCNC: 103 MMOL/L (ref 98–107)
CHOLESTEROL/HDL RATIO: 3.3
CHOLESTEROL: 124 MG/DL
CO2: 27 MMOL/L (ref 20–31)
CREAT SERPL-MCNC: 0.84 MG/DL (ref 0.7–1.2)
EOSINOPHILS RELATIVE PERCENT: 0 % (ref 1–4)
GFR AFRICAN AMERICAN: >60 ML/MIN
GFR NON-AFRICAN AMERICAN: >60 ML/MIN
GFR SERPL CREATININE-BSD FRML MDRD: NORMAL ML/MIN/{1.73_M2}
GFR SERPL CREATININE-BSD FRML MDRD: NORMAL ML/MIN/{1.73_M2}
GLUCOSE BLD-MCNC: 93 MG/DL (ref 70–99)
HCT VFR BLD CALC: 39.4 % (ref 40.7–50.3)
HDLC SERPL-MCNC: 38 MG/DL
HEMOGLOBIN: 13.5 G/DL (ref 13–17)
IMMATURE GRANULOCYTES: 0 %
LDL CHOLESTEROL: 68 MG/DL (ref 0–130)
LYMPHOCYTES # BLD: 26 % (ref 24–43)
MCH RBC QN AUTO: 29.2 PG (ref 25.2–33.5)
MCHC RBC AUTO-ENTMCNC: 34.3 G/DL (ref 28.4–34.8)
MCV RBC AUTO: 85.3 FL (ref 82.6–102.9)
MONOCYTES # BLD: 10 % (ref 3–12)
NRBC AUTOMATED: 0 PER 100 WBC
PDW BLD-RTO: 12.9 % (ref 11.8–14.4)
PLATELET # BLD: 149 K/UL (ref 138–453)
PMV BLD AUTO: 10.4 FL (ref 8.1–13.5)
POTASSIUM SERPL-SCNC: 3.9 MMOL/L (ref 3.7–5.3)
RBC # BLD: 4.62 M/UL (ref 4.21–5.77)
SEG NEUTROPHILS: 64 % (ref 36–65)
SEGMENTED NEUTROPHILS ABSOLUTE COUNT: 3.45 K/UL (ref 1.5–8.1)
SODIUM BLD-SCNC: 139 MMOL/L (ref 135–144)
TRIGL SERPL-MCNC: 88 MG/DL
WBC # BLD: 5.4 K/UL (ref 3.5–11.3)

## 2022-05-06 PROCEDURE — 36415 COLL VENOUS BLD VENIPUNCTURE: CPT

## 2022-05-06 PROCEDURE — 85025 COMPLETE CBC W/AUTO DIFF WBC: CPT

## 2022-05-06 PROCEDURE — 80061 LIPID PANEL: CPT

## 2022-05-06 PROCEDURE — 80048 BASIC METABOLIC PNL TOTAL CA: CPT

## 2022-05-06 PROCEDURE — P9604 ONE-WAY ALLOW PRORATED TRIP: HCPCS

## 2022-05-09 ENCOUNTER — HOSPITAL ENCOUNTER (OUTPATIENT)
Age: 27
Discharge: HOME | End: 2022-05-09
Payer: MEDICARE

## 2022-05-09 LAB
EKG ATRIAL RATE: 112 BPM
EKG P AXIS: 69 DEGREES
EKG P-R INTERVAL: 156 MS
EKG Q-T INTERVAL: 330 MS
EKG QRS DURATION: 90 MS
EKG QTC CALCULATION (BAZETT): 450 MS
EKG R AXIS: 92 DEGREES
EKG T AXIS: 54 DEGREES
EKG VENTRICULAR RATE: 112 BPM

## 2022-05-09 PROCEDURE — 93005 ELECTROCARDIOGRAM TRACING: CPT | Performed by: FAMILY MEDICINE

## 2022-05-09 PROCEDURE — 93010 ELECTROCARDIOGRAM REPORT: CPT | Performed by: INTERNAL MEDICINE

## 2022-10-25 ENCOUNTER — HOSPITAL ENCOUNTER (OUTPATIENT)
Age: 27
Setting detail: SPECIMEN
Discharge: HOME | End: 2022-10-25
Payer: MEDICARE

## 2022-10-25 LAB — HEPATITIS C ANTIBODY: NONREACTIVE

## 2022-10-25 PROCEDURE — 86803 HEPATITIS C AB TEST: CPT

## 2022-10-25 PROCEDURE — 36415 COLL VENOUS BLD VENIPUNCTURE: CPT

## 2023-01-24 ENCOUNTER — HOSPITAL ENCOUNTER (OUTPATIENT)
Age: 28
Discharge: HOME | End: 2023-01-24

## 2023-01-24 LAB
EKG ATRIAL RATE: 114 BPM
EKG P AXIS: 68 DEGREES
EKG P-R INTERVAL: 146 MS
EKG Q-T INTERVAL: 338 MS
EKG QRS DURATION: 84 MS
EKG QTC CALCULATION (BAZETT): 465 MS
EKG R AXIS: 90 DEGREES
EKG T AXIS: 33 DEGREES
EKG VENTRICULAR RATE: 114 BPM

## 2023-02-27 ENCOUNTER — HOSPITAL ENCOUNTER (OUTPATIENT)
Age: 28
Setting detail: SPECIMEN
Discharge: HOME | End: 2023-02-27
Payer: MEDICARE

## 2023-02-27 LAB
BACTERIA: ABNORMAL
BILIRUBIN URINE: NEGATIVE
COLOR: YELLOW
EPITHELIAL CELLS UA: ABNORMAL /HPF (ref 0–5)
GLUCOSE UR STRIP.AUTO-MCNC: NEGATIVE MG/DL
KETONES UR STRIP.AUTO-MCNC: NEGATIVE MG/DL
LEUKOCYTE ESTERASE UR QL STRIP.AUTO: ABNORMAL
NITRITE UR QL STRIP.AUTO: POSITIVE
PROT UR STRIP.AUTO-MCNC: 7 MG/DL (ref 5–9)
PROT UR STRIP.AUTO-MCNC: NEGATIVE MG/DL
RBC CLUMPS #/AREA URNS AUTO: ABNORMAL /HPF (ref 0–2)
SPECIFIC GRAVITY UA: 1.01 (ref 1.01–1.02)
TURBIDITY: CLEAR
URINE HGB: NEGATIVE
UROBILINOGEN, URINE: ABNORMAL
WBC UA: ABNORMAL /HPF (ref 0–5)

## 2023-02-27 PROCEDURE — 81001 URINALYSIS AUTO W/SCOPE: CPT

## 2023-02-27 PROCEDURE — 87186 SC STD MICRODIL/AGAR DIL: CPT

## 2023-02-27 PROCEDURE — 87077 CULTURE AEROBIC IDENTIFY: CPT

## 2023-02-27 PROCEDURE — 87088 URINE BACTERIA CULTURE: CPT

## 2023-02-27 PROCEDURE — 87086 URINE CULTURE/COLONY COUNT: CPT

## 2023-03-02 LAB
MICROORGANISM SPEC CULT: ABNORMAL
MICROORGANISM SPEC CULT: ABNORMAL
SPECIMEN DESCRIPTION: ABNORMAL

## 2023-05-05 ENCOUNTER — HOSPITAL ENCOUNTER (OUTPATIENT)
Age: 28
Setting detail: SPECIMEN
Discharge: HOME | End: 2023-05-05

## 2023-05-05 LAB
ABSOLUTE EOS #: <0.03 K/UL (ref 0–0.44)
ABSOLUTE IMMATURE GRANULOCYTE: <0.03 K/UL (ref 0–0.3)
ABSOLUTE LYMPH #: 1.5 K/UL (ref 1.1–3.7)
ABSOLUTE MONO #: 0.71 K/UL (ref 0.1–1.2)
ANION GAP SERPL CALCULATED.3IONS-SCNC: 9 MMOL/L (ref 9–17)
BASOPHILS # BLD: 0 % (ref 0–2)
BASOPHILS ABSOLUTE: <0.03 K/UL (ref 0–0.2)
BUN SERPL-MCNC: 14 MG/DL (ref 6–20)
BUN/CREAT BLD: 17 (ref 9–20)
CALCIUM SERPL-MCNC: 9.4 MG/DL (ref 8.6–10.4)
CHLORIDE SERPL-SCNC: 108 MMOL/L (ref 98–107)
CHOLEST SERPL-MCNC: 154 MG/DL
CHOLESTEROL/HDL RATIO: 3.9
CO2 SERPL-SCNC: 26 MMOL/L (ref 20–31)
CREAT SERPL-MCNC: 0.82 MG/DL (ref 0.7–1.2)
EOSINOPHILS RELATIVE PERCENT: 0 % (ref 1–4)
GFR SERPL CREATININE-BSD FRML MDRD: >60 ML/MIN/1.73M2
GLUCOSE SERPL-MCNC: 90 MG/DL (ref 70–99)
HCT VFR BLD AUTO: 40 % (ref 40.7–50.3)
HDLC SERPL-MCNC: 39 MG/DL
HGB BLD-MCNC: 13.8 G/DL (ref 13–17)
IMMATURE GRANULOCYTES: 0 %
LDLC SERPL CALC-MCNC: 84 MG/DL (ref 0–130)
LYMPHOCYTES # BLD: 25 % (ref 24–43)
MCH RBC QN AUTO: 29.4 PG (ref 25.2–33.5)
MCHC RBC AUTO-ENTMCNC: 34.5 G/DL (ref 28.4–34.8)
MCV RBC AUTO: 85.1 FL (ref 82.6–102.9)
MONOCYTES # BLD: 12 % (ref 3–12)
NRBC AUTOMATED: 0 PER 100 WBC
PDW BLD-RTO: 13.2 % (ref 11.8–14.4)
PLATELET # BLD AUTO: 165 K/UL (ref 138–453)
PMV BLD AUTO: 10.1 FL (ref 8.1–13.5)
POTASSIUM SERPL-SCNC: 4 MMOL/L (ref 3.7–5.3)
RBC # BLD: 4.7 M/UL (ref 4.21–5.77)
SEG NEUTROPHILS: 63 % (ref 36–65)
SEGMENTED NEUTROPHILS ABSOLUTE COUNT: 3.84 K/UL (ref 1.5–8.1)
SODIUM SERPL-SCNC: 143 MMOL/L (ref 135–144)
TRIGL SERPL-MCNC: 157 MG/DL
WBC # BLD AUTO: 6.1 K/UL (ref 3.5–11.3)

## 2023-05-05 PROCEDURE — 36415 COLL VENOUS BLD VENIPUNCTURE: CPT

## 2023-05-05 PROCEDURE — 80048 BASIC METABOLIC PNL TOTAL CA: CPT

## 2023-05-05 PROCEDURE — 80061 LIPID PANEL: CPT

## 2023-05-05 PROCEDURE — 85025 COMPLETE CBC W/AUTO DIFF WBC: CPT

## 2023-05-09 ENCOUNTER — TELEPHONE (OUTPATIENT)
Dept: UROLOGY | Age: 28
End: 2023-05-09

## 2023-05-09 NOTE — TELEPHONE ENCOUNTER
Veena from [facility] called and wants to know if we can see patient at [facility] on 5/25. They have tried twice to get him here and he would not get out of the [place].

## 2023-05-15 ENCOUNTER — HOSPITAL ENCOUNTER (OUTPATIENT)
Age: 28
Discharge: HOME | End: 2023-05-15
Payer: MEDICARE

## 2023-05-15 PROCEDURE — 93005 ELECTROCARDIOGRAM TRACING: CPT | Performed by: FAMILY MEDICINE

## 2023-05-16 LAB
EKG ATRIAL RATE: 77 BPM
EKG P AXIS: 51 DEGREES
EKG P-R INTERVAL: 152 MS
EKG Q-T INTERVAL: 408 MS
EKG QRS DURATION: 84 MS
EKG QTC CALCULATION (BAZETT): 461 MS
EKG R AXIS: 87 DEGREES
EKG T AXIS: 74 DEGREES
EKG VENTRICULAR RATE: 77 BPM

## 2023-05-16 PROCEDURE — 93010 ELECTROCARDIOGRAM REPORT: CPT | Performed by: FAMILY MEDICINE

## 2023-07-27 ENCOUNTER — TELEPHONE (OUTPATIENT)
Dept: UROLOGY | Age: 28
End: 2023-07-27

## 2023-07-27 NOTE — TELEPHONE ENCOUNTER
Phone call to [facility] and spoke with nurse, Dagmar Mcmahan, to follow up on constipation and difficulty with urination. Dagmar Mcmahan reports the patient is no longer having constipation, as she has not had to give Milk of Magnesia or a suppository since the patient started Miralax. Patient is taking Flomax qd as ordered. The patient continues to urinate on the floor and in other parts of his room, but the nursing staff feel that is a behavior issue. The patient has intermittent nocturnal enuresis and he refuses to wear a brief. Patient has not exhibited signs of grunting, straining or difficulty in urinating since the CNP visit the facility. This nurse requested Dagmar Mcmahan fax a copy of the bowel elimination record to [phone] for the provider to review.

## 2023-07-28 NOTE — TELEPHONE ENCOUNTER
Phone call to Marleni, nurse at [facility], to inform of provider response:  Continue miralax     Continue flomax     F/U during facility rounds    Veronika verbalizes understanding.

## 2023-08-10 ENCOUNTER — HOSPITAL ENCOUNTER (OUTPATIENT)
Age: 28
Setting detail: SPECIMEN
Discharge: HOME | End: 2023-08-10
Payer: MEDICARE

## 2023-08-10 LAB
HCV AB SERPL QL IA: NONREACTIVE
HIV 1+2 AB+HIV1 P24 AG SERPL QL IA: NONREACTIVE

## 2023-08-10 PROCEDURE — 87389 HIV-1 AG W/HIV-1&-2 AB AG IA: CPT

## 2023-08-10 PROCEDURE — 86803 HEPATITIS C AB TEST: CPT

## 2023-09-19 ENCOUNTER — OFFICE VISIT (OUTPATIENT)
Dept: UROLOGY | Age: 28
End: 2023-09-19
Payer: MEDICARE

## 2023-09-19 VITALS — DIASTOLIC BLOOD PRESSURE: 86 MMHG | SYSTOLIC BLOOD PRESSURE: 130 MMHG

## 2023-09-19 DIAGNOSIS — R35.0 FREQUENCY OF URINATION: ICD-10-CM

## 2023-09-19 DIAGNOSIS — R39.198 DIFFICULTY URINATING: ICD-10-CM

## 2023-09-19 DIAGNOSIS — R33.9 INCOMPLETE BLADDER EMPTYING: ICD-10-CM

## 2023-09-19 DIAGNOSIS — K59.00 CONSTIPATION, UNSPECIFIED CONSTIPATION TYPE: ICD-10-CM

## 2023-09-19 DIAGNOSIS — N39.42 URINARY INCONTINENCE WITHOUT SENSORY AWARENESS: Primary | ICD-10-CM

## 2023-09-19 PROCEDURE — 51798 US URINE CAPACITY MEASURE: CPT | Performed by: NURSE PRACTITIONER

## 2023-09-19 PROCEDURE — 99309 SBSQ NF CARE MODERATE MDM 30: CPT | Performed by: NURSE PRACTITIONER

## 2023-09-19 RX ORDER — TAMSULOSIN HYDROCHLORIDE 0.4 MG/1
0.4 CAPSULE ORAL DAILY
COMMUNITY

## 2023-09-19 ASSESSMENT — ENCOUNTER SYMPTOMS
DIARRHEA: 0
CONSTIPATION: 0
ABDOMINAL PAIN: 0

## 2023-09-19 NOTE — PROGRESS NOTES
Random bladderscan performed at [facility]  scan = 54 mL
suspension Take 30 mLs by mouth every other day      albuterol sulfate  (90 BASE) MCG/ACT inhaler Inhale 2 puffs into the lungs every 4 hours as needed for Wheezing       No facility-administered encounter medications on file as of 9/19/2023. Current Outpatient Medications on File Prior to Visit   Medication Sig Dispense Refill    tamsulosin (FLOMAX) 0.4 MG capsule Take 1 capsule by mouth daily      chlorproMAZINE (THORAZINE) 50 MG tablet Take 1 tablet by mouth daily Take with 200 mg tablet for total of 250 mg every morning      chlorproMAZINE (THORAZINE) 100 MG tablet Take 1 tablet by mouth daily At noon      DIAZEPAM RE Place rectally as needed For seizures lasting longer than 5 minutes      diphenhydrAMINE (BENADRYL) 25 MG tablet Take 1 tablet by mouth every 6 hours as needed for Itching      lacosamide (VIMPAT) 50 MG TABS tablet Take 3 tablets by mouth 2 times daily. ibuprofen (IBU) 800 MG tablet Take 1 tablet by mouth every 8 hours as needed for Pain 21 tablet 0    chlorproMAZINE (THORAZINE) 200 MG tablet Take 1 tablet by mouth daily Take with 50 mg to for total of 250 mg daily in morning      cloNIDine (CATAPRES) 0.1 MG tablet Take 1 tablet by mouth 2 times daily Give in the morning and at noon      cloNIDine (CATAPRES) 0.2 MG tablet Take 1 tablet by mouth nightly      fluticasone (FLONASE) 50 MCG/ACT nasal spray 1 spray by Nasal route daily To each nostril      lactulose (CHRONULAC) 10 GM/15ML solution Take 30 mLs by mouth 2 times daily      loratadine (CLARITIN) 10 MG tablet Take 1 tablet by mouth daily      LORazepam (ATIVAN) 1 MG tablet Take 1 tablet by mouth 3 times daily.       Melatonin 5 MG TABS tablet Take 1 tablet by mouth nightly      MINERAL OIL PO Take 15 mLs by mouth nightly      OLANZapine (ZYPREXA) 10 MG tablet Take 1 tablet by mouth 2 times daily      omeprazole (PRILOSEC) 20 MG capsule Take 1 capsule by mouth Daily      propranolol (INDERAL) 20 MG tablet Take 1 tablet by mouth

## 2023-11-03 ENCOUNTER — HOSPITAL ENCOUNTER (OUTPATIENT)
Dept: GENERAL RADIOLOGY | Age: 28
End: 2023-11-03
Payer: MEDICARE

## 2023-11-03 ENCOUNTER — HOSPITAL ENCOUNTER (OUTPATIENT)
Age: 28
End: 2023-11-03
Payer: MEDICARE

## 2023-11-03 DIAGNOSIS — R05.9 COUGH, UNSPECIFIED TYPE: ICD-10-CM

## 2023-11-03 PROCEDURE — 71046 X-RAY EXAM CHEST 2 VIEWS: CPT

## 2023-11-13 ENCOUNTER — HOSPITAL ENCOUNTER (OUTPATIENT)
Age: 28
Discharge: HOME | End: 2023-11-13
Payer: MEDICARE

## 2023-11-13 LAB
EKG ATRIAL RATE: 89 BPM
EKG P AXIS: 49 DEGREES
EKG P-R INTERVAL: 164 MS
EKG Q-T INTERVAL: 376 MS
EKG QRS DURATION: 92 MS
EKG QTC CALCULATION (BAZETT): 457 MS
EKG R AXIS: 82 DEGREES
EKG T AXIS: 39 DEGREES
EKG VENTRICULAR RATE: 89 BPM

## 2023-11-13 PROCEDURE — 93010 ELECTROCARDIOGRAM REPORT: CPT | Performed by: INTERNAL MEDICINE

## 2023-11-13 PROCEDURE — 93005 ELECTROCARDIOGRAM TRACING: CPT | Performed by: INTERNAL MEDICINE

## 2023-12-12 ENCOUNTER — OFFICE VISIT (OUTPATIENT)
Dept: UROLOGY | Age: 28
End: 2023-12-12

## 2023-12-12 DIAGNOSIS — N39.42 URINARY INCONTINENCE WITHOUT SENSORY AWARENESS: Primary | ICD-10-CM

## 2023-12-12 ASSESSMENT — ENCOUNTER SYMPTOMS
SHORTNESS OF BREATH: 0
VOMITING: 0
NAUSEA: 0
ABDOMINAL PAIN: 0
EYE REDNESS: 0
BACK PAIN: 0
WHEEZING: 0
COLOR CHANGE: 0
CONSTIPATION: 0
COUGH: 0

## 2023-12-12 NOTE — PATIENT INSTRUCTIONS
SURVEY:    You may be receiving a survey from [employer] regarding your visit today. Please complete the survey to enable us to provide the highest quality of care to you and your family. If you cannot score us a very good on any question, please call the office to discuss how we could have made your experience a very good one. Thank you.

## 2023-12-12 NOTE — PROGRESS NOTES
Random bladderscan performed at the nursing facility today, bladder  scan = 120 mL
Negative for color change, rash and wound. Neurological:  Negative for tremors. Hematological:  Negative for adenopathy. Does not bruise/bleed easily. Psychiatric/Behavioral:  Positive for agitation and behavioral problems. PHYSICAL EXAM:  Constitutional: Patient in no acute distress; Neuro: alert and oriented to person   Psych: Mood and affect normal.  Skin: Normal  Lungs: Respiratory effort normal  Cardiovascular:  Normal peripheral pulses  Abdomen: Soft, non-tender, non-distended with no CVA, flank pain  Bladder non-tender and not distended. Lab Results   Component Value Date    BUN 14 05/05/2023     Lab Results   Component Value Date    CREATININE 0.82 05/05/2023     No results found for: \"PSA\"    ASSESSMENT:   Diagnosis Orders   1.  Urinary incontinence without sensory awareness              PLAN:  Increase myrbetriq to 50mg PO daily    F/U in 3 months during facility rounds

## 2024-03-04 RX ORDER — TRAZODONE HYDROCHLORIDE 100 MG/1
100 TABLET ORAL NIGHTLY
COMMUNITY

## 2024-03-04 RX ORDER — MULTIVITAMIN,THERAPEUTIC
1 TABLET ORAL DAILY
COMMUNITY

## 2024-03-05 ENCOUNTER — OFFICE VISIT (OUTPATIENT)
Dept: UROLOGY | Age: 29
End: 2024-03-05

## 2024-03-05 DIAGNOSIS — N39.42 URINARY INCONTINENCE WITHOUT SENSORY AWARENESS: Primary | ICD-10-CM

## 2024-03-05 DIAGNOSIS — R39.198 DIFFICULTY URINATING: ICD-10-CM

## 2024-03-05 DIAGNOSIS — R33.9 INCOMPLETE BLADDER EMPTYING: ICD-10-CM

## 2024-03-05 DIAGNOSIS — K59.00 CONSTIPATION, UNSPECIFIED CONSTIPATION TYPE: ICD-10-CM

## 2024-03-05 NOTE — PROGRESS NOTES
He is alert and oriented to person.         History  6/2023 Referral for urinary issues.  He is nonverbal.  History of aggressive behavior.  Staff is helping us with the visit today.  Has incontinence.  He also at times void every 2 hours for staff.  There is times where he will urinate in inappropriate places, like under the sink.  Staff reports he is also defecating in inappropriate places.  He only has a bowel movement every 3-4 days.  If notes when he does attempt to urinate he does seem like he is straining to void.  He is on oxybutynin daily.  He voided 1 hour ago.  PVR is 439 mL.  Staff denies any evidence of hematuria.  Staff denies any self-mutilation.  Staff denies any known history of sexual assault.   Stopped oxybutynin     Started flomax and miralax     9/2023 started myrbetriq    12/2023 He is no longer voiding or defecating in public areas.  He is urinating in his bedroom on the floor by his bed. Staff does not feel like he is straining to void any longer.    Increased myrbetriq to 50mg    Today  Here today at [facility] to follow-up for inappropriate voiding and constipation.  Staff is assisting with visit.  He is nonverbal.  He is no longer voiding or defecating in public areas.  However, he did have a period of worsening behaviors after a visit from his mom.  According to staff this has improved.  He is having a daily bowel movement. Random bladder scan is low.    Plan  Continue Myrbetriq    Continue Flomax    Continue MiraLAX    Follow-up in 6 months during facility rounds or sooner if needed

## 2024-05-13 ENCOUNTER — HOSPITAL ENCOUNTER (OUTPATIENT)
Age: 29
Discharge: HOME | End: 2024-05-13
Payer: MEDICARE

## 2024-05-13 LAB
EKG ATRIAL RATE: 79 BPM
EKG P AXIS: 60 DEGREES
EKG P-R INTERVAL: 158 MS
EKG Q-T INTERVAL: 374 MS
EKG QRS DURATION: 92 MS
EKG QTC CALCULATION (BAZETT): 428 MS
EKG R AXIS: 97 DEGREES
EKG T AXIS: 20 DEGREES
EKG VENTRICULAR RATE: 79 BPM

## 2024-05-13 PROCEDURE — 93005 ELECTROCARDIOGRAM TRACING: CPT | Performed by: INTERNAL MEDICINE

## 2024-05-13 PROCEDURE — 93010 ELECTROCARDIOGRAM REPORT: CPT | Performed by: INTERNAL MEDICINE

## 2024-05-14 ENCOUNTER — HOSPITAL ENCOUNTER (OUTPATIENT)
Age: 29
Setting detail: SPECIMEN
Discharge: HOME | End: 2024-05-14
Payer: MEDICARE

## 2024-05-14 LAB
ANION GAP SERPL CALCULATED.3IONS-SCNC: 8 MMOL/L (ref 9–17)
BASOPHILS # BLD: <0.03 K/UL (ref 0–0.2)
BASOPHILS NFR BLD: 0 % (ref 0–2)
BUN SERPL-MCNC: 11 MG/DL (ref 6–20)
BUN/CREAT SERPL: 12 (ref 9–20)
CALCIUM SERPL-MCNC: 9.3 MG/DL (ref 8.6–10.4)
CHLORIDE SERPL-SCNC: 104 MMOL/L (ref 98–107)
CHOLEST SERPL-MCNC: 141 MG/DL (ref 0–199)
CHOLESTEROL/HDL RATIO: 4
CO2 SERPL-SCNC: 28 MMOL/L (ref 20–31)
CREAT SERPL-MCNC: 0.9 MG/DL (ref 0.7–1.2)
EOSINOPHIL # BLD: <0.03 K/UL (ref 0–0.44)
EOSINOPHILS RELATIVE PERCENT: 0 % (ref 1–4)
ERYTHROCYTE [DISTWIDTH] IN BLOOD BY AUTOMATED COUNT: 13.5 % (ref 11.8–14.4)
GFR, ESTIMATED: >90 ML/MIN/1.73M2
GLUCOSE SERPL-MCNC: 98 MG/DL (ref 70–99)
HCT VFR BLD AUTO: 37.7 % (ref 40.7–50.3)
HDLC SERPL-MCNC: 38 MG/DL
HGB BLD-MCNC: 13 G/DL (ref 13–17)
IMM GRANULOCYTES # BLD AUTO: <0.03 K/UL (ref 0–0.3)
IMM GRANULOCYTES NFR BLD: 0 %
LDLC SERPL CALC-MCNC: 79 MG/DL (ref 0–100)
LYMPHOCYTES NFR BLD: 1.56 K/UL (ref 1.1–3.7)
LYMPHOCYTES RELATIVE PERCENT: 30 % (ref 24–43)
MCH RBC QN AUTO: 29.7 PG (ref 25.2–33.5)
MCHC RBC AUTO-ENTMCNC: 34.5 G/DL (ref 28.4–34.8)
MCV RBC AUTO: 86.3 FL (ref 82.6–102.9)
MONOCYTES NFR BLD: 0.59 K/UL (ref 0.1–1.2)
MONOCYTES NFR BLD: 11 % (ref 3–12)
NEUTROPHILS NFR BLD: 59 % (ref 36–65)
NEUTS SEG NFR BLD: 3.09 K/UL (ref 1.5–8.1)
NRBC BLD-RTO: 0 PER 100 WBC
PLATELET # BLD AUTO: 144 K/UL (ref 138–453)
PMV BLD AUTO: 10.1 FL (ref 8.1–13.5)
POTASSIUM SERPL-SCNC: 4 MMOL/L (ref 3.7–5.3)
RBC # BLD AUTO: 4.37 M/UL (ref 4.21–5.77)
SODIUM SERPL-SCNC: 140 MMOL/L (ref 135–144)
TRIGL SERPL-MCNC: 121 MG/DL
VLDLC SERPL CALC-MCNC: 24 MG/DL
WBC OTHER # BLD: 5.3 K/UL (ref 3.5–11.3)

## 2024-05-14 PROCEDURE — P9604 ONE-WAY ALLOW PRORATED TRIP: HCPCS

## 2024-05-14 PROCEDURE — 36415 COLL VENOUS BLD VENIPUNCTURE: CPT

## 2024-05-14 PROCEDURE — 80061 LIPID PANEL: CPT

## 2024-05-14 PROCEDURE — 85025 COMPLETE CBC W/AUTO DIFF WBC: CPT

## 2024-05-14 PROCEDURE — 80048 BASIC METABOLIC PNL TOTAL CA: CPT

## 2024-05-31 ENCOUNTER — HOSPITAL ENCOUNTER (OUTPATIENT)
Age: 29
Setting detail: SPECIMEN
Discharge: HOME | End: 2024-05-31
Payer: MEDICARE

## 2024-05-31 LAB
HCV AB SERPL QL IA: NONREACTIVE
HIV 1+2 AB+HIV1 P24 AG SERPL QL IA: NONREACTIVE

## 2024-05-31 PROCEDURE — 87389 HIV-1 AG W/HIV-1&-2 AB AG IA: CPT

## 2024-05-31 PROCEDURE — P9603 ONE-WAY ALLOW PRORATED MILES: HCPCS

## 2024-05-31 PROCEDURE — 86803 HEPATITIS C AB TEST: CPT

## 2024-05-31 PROCEDURE — 36415 COLL VENOUS BLD VENIPUNCTURE: CPT

## 2024-08-15 ENCOUNTER — APPOINTMENT (OUTPATIENT)
Dept: CT IMAGING | Age: 29
End: 2024-08-15
Payer: MEDICARE

## 2024-08-15 ENCOUNTER — HOSPITAL ENCOUNTER (EMERGENCY)
Age: 29
Discharge: HOME | End: 2024-08-15
Attending: EMERGENCY MEDICINE
Payer: MEDICARE

## 2024-08-15 VITALS
HEIGHT: 67 IN | HEART RATE: 106 BPM | BODY MASS INDEX: 36.41 KG/M2 | DIASTOLIC BLOOD PRESSURE: 91 MMHG | TEMPERATURE: 97.9 F | SYSTOLIC BLOOD PRESSURE: 136 MMHG | OXYGEN SATURATION: 95 % | WEIGHT: 232 LBS

## 2024-08-15 DIAGNOSIS — R56.9 SEIZURE (HCC): Primary | ICD-10-CM

## 2024-08-15 DIAGNOSIS — G40.909 SEIZURE DISORDER (HCC): ICD-10-CM

## 2024-08-15 LAB
ALBUMIN SERPL-MCNC: 4.3 G/DL (ref 3.5–5.2)
ALBUMIN/GLOB SERPL: 1.8 {RATIO} (ref 1–2.5)
ALP SERPL-CCNC: 122 U/L (ref 40–129)
ALT SERPL-CCNC: 75 U/L (ref 5–41)
ANION GAP SERPL CALCULATED.3IONS-SCNC: 16 MMOL/L (ref 9–17)
AST SERPL-CCNC: 60 U/L
BASOPHILS # BLD: <0.03 K/UL (ref 0–0.2)
BASOPHILS NFR BLD: 0 % (ref 0–2)
BILIRUB SERPL-MCNC: 0.6 MG/DL (ref 0.3–1.2)
BUN SERPL-MCNC: 12 MG/DL (ref 6–20)
BUN/CREAT SERPL: 12 (ref 9–20)
CALCIUM SERPL-MCNC: 9 MG/DL (ref 8.6–10.4)
CHLORIDE SERPL-SCNC: 104 MMOL/L (ref 98–107)
CO2 SERPL-SCNC: 20 MMOL/L (ref 20–31)
CREAT SERPL-MCNC: 1 MG/DL (ref 0.7–1.2)
EKG ATRIAL RATE: 105 BPM
EKG P AXIS: 55 DEGREES
EKG P-R INTERVAL: 142 MS
EKG Q-T INTERVAL: 370 MS
EKG QRS DURATION: 86 MS
EKG QTC CALCULATION (BAZETT): 489 MS
EKG R AXIS: 45 DEGREES
EKG T AXIS: 43 DEGREES
EKG VENTRICULAR RATE: 105 BPM
EOSINOPHIL # BLD: <0.03 K/UL (ref 0–0.44)
EOSINOPHILS RELATIVE PERCENT: 0 % (ref 1–4)
ERYTHROCYTE [DISTWIDTH] IN BLOOD BY AUTOMATED COUNT: 13.9 % (ref 11.8–14.4)
GFR, ESTIMATED: >90 ML/MIN/1.73M2
GLUCOSE SERPL-MCNC: 118 MG/DL (ref 70–99)
HCT VFR BLD AUTO: 40 % (ref 40.7–50.3)
HGB BLD-MCNC: 13.6 G/DL (ref 13–17)
IMM GRANULOCYTES # BLD AUTO: 0.14 K/UL (ref 0–0.3)
IMM GRANULOCYTES NFR BLD: 2 %
LYMPHOCYTES NFR BLD: 0.97 K/UL (ref 1.1–3.7)
LYMPHOCYTES RELATIVE PERCENT: 16 % (ref 24–43)
MAGNESIUM SERPL-MCNC: 2.1 MG/DL (ref 1.6–2.6)
MCH RBC QN AUTO: 28.8 PG (ref 25.2–33.5)
MCHC RBC AUTO-ENTMCNC: 34 G/DL (ref 28.4–34.8)
MCV RBC AUTO: 84.7 FL (ref 82.6–102.9)
MONOCYTES NFR BLD: 0.53 K/UL (ref 0.1–1.2)
MONOCYTES NFR BLD: 9 % (ref 3–12)
NEUTROPHILS NFR BLD: 73 % (ref 36–65)
NEUTS SEG NFR BLD: 4.43 K/UL (ref 1.5–8.1)
NRBC BLD-RTO: 0 PER 100 WBC
PLATELET # BLD AUTO: 177 K/UL (ref 138–453)
PMV BLD AUTO: 9.9 FL (ref 8.1–13.5)
POTASSIUM SERPL-SCNC: 4.4 MMOL/L (ref 3.7–5.3)
PROT SERPL-MCNC: 6.7 G/DL (ref 6.4–8.3)
RBC # BLD AUTO: 4.72 M/UL (ref 4.21–5.77)
SODIUM SERPL-SCNC: 140 MMOL/L (ref 135–144)
WBC OTHER # BLD: 6.1 K/UL (ref 3.5–11.3)

## 2024-08-15 PROCEDURE — 99284 EMERGENCY DEPT VISIT MOD MDM: CPT

## 2024-08-15 PROCEDURE — 83735 ASSAY OF MAGNESIUM: CPT

## 2024-08-15 PROCEDURE — 72125 CT NECK SPINE W/O DYE: CPT

## 2024-08-15 PROCEDURE — 6360000002 HC RX W HCPCS: Performed by: EMERGENCY MEDICINE

## 2024-08-15 PROCEDURE — 85025 COMPLETE CBC W/AUTO DIFF WBC: CPT

## 2024-08-15 PROCEDURE — 6370000000 HC RX 637 (ALT 250 FOR IP): Performed by: EMERGENCY MEDICINE

## 2024-08-15 PROCEDURE — 93005 ELECTROCARDIOGRAM TRACING: CPT | Performed by: EMERGENCY MEDICINE

## 2024-08-15 PROCEDURE — 70450 CT HEAD/BRAIN W/O DYE: CPT

## 2024-08-15 PROCEDURE — 93010 ELECTROCARDIOGRAM REPORT: CPT | Performed by: FAMILY MEDICINE

## 2024-08-15 PROCEDURE — 80053 COMPREHEN METABOLIC PANEL: CPT

## 2024-08-15 PROCEDURE — 96374 THER/PROPH/DIAG INJ IV PUSH: CPT

## 2024-08-15 RX ORDER — FLUOXETINE HYDROCHLORIDE 20 MG/1
40 CAPSULE ORAL DAILY
Status: ON HOLD | COMMUNITY

## 2024-08-15 RX ORDER — MIDAZOLAM HYDROCHLORIDE 2 MG/2ML
5 INJECTION, SOLUTION INTRAMUSCULAR; INTRAVENOUS ONCE
Status: COMPLETED | OUTPATIENT
Start: 2024-08-15 | End: 2024-08-15

## 2024-08-15 RX ORDER — LACOSAMIDE 50 MG/1
200 TABLET ORAL ONCE
Status: COMPLETED | OUTPATIENT
Start: 2024-08-15 | End: 2024-08-15

## 2024-08-15 RX ORDER — OLANZAPINE 15 MG/1
15 TABLET ORAL DAILY
Status: ON HOLD | COMMUNITY

## 2024-08-15 RX ORDER — MIDAZOLAM HYDROCHLORIDE 5 MG/ML
INJECTION INTRAMUSCULAR; INTRAVENOUS
Status: DISCONTINUED
Start: 2024-08-15 | End: 2024-08-15 | Stop reason: HOSPADM

## 2024-08-15 RX ORDER — LACOSAMIDE 100 MG/1
100 TABLET ORAL 2 TIMES DAILY
Qty: 60 TABLET | Refills: 1 | Status: ON HOLD | OUTPATIENT
Start: 2024-08-15 | End: 2024-10-14

## 2024-08-15 RX ADMIN — LACOSAMIDE 200 MG: 50 TABLET, FILM COATED ORAL at 11:55

## 2024-08-15 RX ADMIN — MIDAZOLAM HYDROCHLORIDE 5 MG: 1 INJECTION, SOLUTION INTRAMUSCULAR; INTRAVENOUS at 08:29

## 2024-08-15 NOTE — ED NOTES
Contacted [facility] requesting they reach out to neuro at [facility] due to no response from [facility].  Stated they just put another page out so will wait just a few minutes to see if [facility] responds.

## 2024-08-15 NOTE — DISCHARGE INSTRUCTIONS
Return to the ED for additional seizure activity that does not spontaneously resolve, recurrent episodes of seizure happening within the same day, development of abnormal behavior, development of persistent vomiting or any other concerns.    Expect a call from the neurology service at [facility] in [place] to arrange for follow-up.    We are starting him on Vimpat.  It appears he has been on this medication in the past but is not actively taking it.  Begin taking as directed.

## 2024-08-15 NOTE — ED PROVIDER NOTES
[facility]  EMERGENCY DEPARTMENT ENCOUNTER      Pt Name: Yuriy Whelan  MRN: [id number]  Birthdate 1995  Date of evaluation: 8/15/2024  Provider: Abdelrahman Riddle MD    CHIEF COMPLAINT       Chief Complaint   Patient presents with    Seizures     Pt from [facility]- staff states patient was in kitchen when he fell, hit head on wall and had a \"grand mal seizure\" lasting approx 3 minutes. Pt confused after episode. Nursing staff states patient has \"childhood seizures\" listed in medical history but has not had seizure-like activity for more than 10 years and is not currently on any anti-epileptic medications.          HISTORY OF PRESENT ILLNESS      Yuriy Whelan is a 28 y.o. male, nonverbal with history of autism, who presents to the emergency department by EMS from [facility] for evaluation following a seizure.  Reportedly fell from his chair and struck a wall or table at the onset of seizure.  [facility] staff states that he has a history of a seizure documented as a child but has not had any since he has been in their care.    Seizure lasted a few minutes.  Postictal per EMS.    Unable to obtain any additional history from the patient.    [facility] staff reports that there was no recent significant illness or other seizure activity.    PAST MEDICAL HISTORY     Past Medical History:   Diagnosis Date    Asthma     Autism disorder     Bipolar disorder (HCC)     Collar bone fracture     Constipation     Depressive disorder     GERD (gastroesophageal reflux disease)     Insomnia     Non-verbal learning disorder     Seizures (HCC)     as of 2- last seziure was 2007    Urinary frequency          SURGICAL HISTORY       History reviewed. No pertinent surgical history.      CURRENT MEDICATIONS       Discharge Medication List as of 8/15/2024 11:57 AM        CONTINUE these medications which have NOT CHANGED    Details   FLUoxetine (PROZAC) 20 MG capsule Take 2 capsules by mouth dailyHistorical Med      !! OLANZapine (ZYPREXA) 15 MG tablet Take

## 2024-08-15 NOTE — PLAN OF CARE
Called by transfer center to discuss patient case.    I spoke to ED attending, Dr. Riddle.    28-year-old male with past medical history of autism, intellectual disability, lives at a group home presented after a bilateral tonic-clonic seizure.  He is currently back to baseline per Dr. Riddle.  CT brain with no acute findings.    Appears he has prior history of epilepsy.  Was seen by neurology in 2016.  Previously has been tried on various ASMs.  Was on Keppra at the time and plan was to transition to Vimpat due to behavioral/anger issues.  He was found to have borderline low WBCs.  Unclear if this would be related to ASMs as not necessarily a common side effect of Keppra or lacosamide.  Nonetheless, he is currently not on any ASMs for the last few years.  Given breakthrough seizure and his prior history of epilepsy, would recommend restarting.    -Check EKG. If NC interval normal, can start on Vimpat 200 mg IV x 1. Then 100/100.  In abundance of caution, can recheck CBC in 2 weeks.  Recommend close follow-up with neurology either locally or with us in [place] in 1-2 weeks.    Discussed with ED attending, Dr. Riddle.  Patient is back to baseline and he does not believe the patient needs to be transferred.  Please call if any further questions.    Sudha Arboleda, DO  Neurology/Epilepsy

## 2024-08-16 ENCOUNTER — HOSPITAL ENCOUNTER (EMERGENCY)
Age: 29
Discharge: ANOTHER HOSPITAL | End: 2024-08-16
Attending: EMERGENCY MEDICINE
Payer: MEDICARE

## 2024-08-16 ENCOUNTER — HOSPITAL ENCOUNTER (INPATIENT)
Age: 29
LOS: 11 days | Discharge: HOME | DRG: 101 | End: 2024-08-27
Attending: PSYCHIATRY & NEUROLOGY | Admitting: PSYCHIATRY & NEUROLOGY
Payer: MEDICARE

## 2024-08-16 ENCOUNTER — APPOINTMENT (OUTPATIENT)
Dept: CT IMAGING | Age: 29
End: 2024-08-16
Payer: MEDICARE

## 2024-08-16 VITALS
TEMPERATURE: 99.2 F | HEIGHT: 67 IN | BODY MASS INDEX: 36.41 KG/M2 | DIASTOLIC BLOOD PRESSURE: 89 MMHG | HEART RATE: 87 BPM | RESPIRATION RATE: 20 BRPM | WEIGHT: 232 LBS | OXYGEN SATURATION: 91 % | SYSTOLIC BLOOD PRESSURE: 152 MMHG

## 2024-08-16 DIAGNOSIS — R56.9 SEIZURE (HCC): ICD-10-CM

## 2024-08-16 DIAGNOSIS — G40.909 SEIZURE DISORDER (HCC): ICD-10-CM

## 2024-08-16 DIAGNOSIS — R41.82 ALTERED MENTAL STATUS, UNSPECIFIED ALTERED MENTAL STATUS TYPE: Primary | ICD-10-CM

## 2024-08-16 LAB
ALBUMIN SERPL-MCNC: 4.1 G/DL (ref 3.5–5.2)
ALBUMIN/GLOB SERPL: 1.5 {RATIO} (ref 1–2.5)
ALP SERPL-CCNC: 99 U/L (ref 40–129)
ALT SERPL-CCNC: 65 U/L (ref 5–41)
AMMONIA PLAS-SCNC: 37 UMOL/L (ref 16–60)
ANION GAP SERPL CALCULATED.3IONS-SCNC: 10 MMOL/L (ref 9–17)
ARTERIAL PATENCY WRIST A: ABNORMAL
AST SERPL-CCNC: 43 U/L
BASOPHILS # BLD: <0.03 K/UL (ref 0–0.2)
BASOPHILS NFR BLD: 0 % (ref 0–2)
BILIRUB SERPL-MCNC: 1.1 MG/DL (ref 0.3–1.2)
BODY TEMPERATURE: 37
BUN SERPL-MCNC: 11 MG/DL (ref 6–20)
BUN/CREAT SERPL: 12 (ref 9–20)
CALCIUM SERPL-MCNC: 9.1 MG/DL (ref 8.6–10.4)
CHLORIDE SERPL-SCNC: 103 MMOL/L (ref 98–107)
CO2 SERPL-SCNC: 26 MMOL/L (ref 20–31)
CREAT SERPL-MCNC: 0.9 MG/DL (ref 0.7–1.2)
EKG ATRIAL RATE: 106 BPM
EKG P AXIS: 49 DEGREES
EKG P-R INTERVAL: 138 MS
EKG Q-T INTERVAL: 358 MS
EKG QRS DURATION: 90 MS
EKG QTC CALCULATION (BAZETT): 475 MS
EKG R AXIS: 56 DEGREES
EKG T AXIS: 50 DEGREES
EKG VENTRICULAR RATE: 106 BPM
EOSINOPHIL # BLD: <0.03 K/UL (ref 0–0.44)
EOSINOPHILS RELATIVE PERCENT: 0 % (ref 1–4)
ERYTHROCYTE [DISTWIDTH] IN BLOOD BY AUTOMATED COUNT: 14.1 % (ref 11.8–14.4)
ERYTHROCYTE [SEDIMENTATION RATE] IN BLOOD BY PHOTOMETRIC METHOD: 4 MM/HR (ref 0–15)
FIO2 ON VENT: 21 %
GAS FLOW.O2 O2 DELIVERY SYS: ABNORMAL L/MIN
GFR, ESTIMATED: >90 ML/MIN/1.73M2
GLUCOSE BLD-MCNC: 100 MG/DL (ref 74–100)
GLUCOSE SERPL-MCNC: 113 MG/DL (ref 70–99)
HCO3 VENOUS: 23.7 MMOL/L (ref 24–30)
HCT VFR BLD AUTO: 37.1 % (ref 40.7–50.3)
HGB BLD-MCNC: 12.9 G/DL (ref 13–17)
IMM GRANULOCYTES # BLD AUTO: <0.03 K/UL (ref 0–0.3)
IMM GRANULOCYTES NFR BLD: 0 %
LACTATE BLDV-SCNC: 1 MMOL/L (ref 0.5–2.2)
LYMPHOCYTES NFR BLD: 0.92 K/UL (ref 1.1–3.7)
LYMPHOCYTES RELATIVE PERCENT: 13 % (ref 24–43)
MAGNESIUM SERPL-MCNC: 2 MG/DL (ref 1.6–2.6)
MCH RBC QN AUTO: 29 PG (ref 25.2–33.5)
MCHC RBC AUTO-ENTMCNC: 34.8 G/DL (ref 28.4–34.8)
MCV RBC AUTO: 83.4 FL (ref 82.6–102.9)
MONOCYTES NFR BLD: 0.84 K/UL (ref 0.1–1.2)
MONOCYTES NFR BLD: 12 % (ref 3–12)
NEGATIVE BASE EXCESS, VEN: 0.1 MMOL/L (ref 0–2)
NEUTROPHILS NFR BLD: 75 % (ref 36–65)
NEUTS SEG NFR BLD: 5.09 K/UL (ref 1.5–8.1)
NRBC BLD-RTO: 0 PER 100 WBC
O2 SAT, VEN: 88 % (ref 60–85)
PCO2 VENOUS: 36.3 MM HG (ref 39–55)
PCO2, VEN, TEMP ADJ: 36.3 MMHG (ref 39–55)
PH VENOUS: 7.43 (ref 7.32–7.42)
PH, VEN, TEMP ADJ: 7.43 (ref 7.32–7.42)
PLATELET # BLD AUTO: 163 K/UL (ref 138–453)
PMV BLD AUTO: 9.5 FL (ref 8.1–13.5)
PO2 VENOUS: 51.9 MM HG (ref 30–50)
PO2, VEN, TEMP ADJ: 51.9 MMHG (ref 30–50)
POTASSIUM SERPL-SCNC: 4 MMOL/L (ref 3.7–5.3)
PROLACTIN SERPL-MCNC: 73.7 NG/ML (ref 4.04–15.2)
PROT SERPL-MCNC: 6.8 G/DL (ref 6.4–8.3)
RBC # BLD AUTO: 4.45 M/UL (ref 4.21–5.77)
SODIUM SERPL-SCNC: 139 MMOL/L (ref 135–144)
TSH SERPL DL<=0.05 MIU/L-ACNC: 0.36 UIU/ML (ref 0.3–5)
WBC OTHER # BLD: 6.9 K/UL (ref 3.5–11.3)

## 2024-08-16 PROCEDURE — 84146 ASSAY OF PROLACTIN: CPT

## 2024-08-16 PROCEDURE — 93005 ELECTROCARDIOGRAM TRACING: CPT | Performed by: EMERGENCY MEDICINE

## 2024-08-16 PROCEDURE — 51701 INSERT BLADDER CATHETER: CPT

## 2024-08-16 PROCEDURE — 85025 COMPLETE CBC W/AUTO DIFF WBC: CPT

## 2024-08-16 PROCEDURE — 80076 HEPATIC FUNCTION PANEL: CPT

## 2024-08-16 PROCEDURE — 51798 US URINE CAPACITY MEASURE: CPT

## 2024-08-16 PROCEDURE — 82947 ASSAY GLUCOSE BLOOD QUANT: CPT

## 2024-08-16 PROCEDURE — 81003 URINALYSIS AUTO W/O SCOPE: CPT

## 2024-08-16 PROCEDURE — 83605 ASSAY OF LACTIC ACID: CPT

## 2024-08-16 PROCEDURE — 80053 COMPREHEN METABOLIC PANEL: CPT

## 2024-08-16 PROCEDURE — 82805 BLOOD GASES W/O2 SATURATION: CPT

## 2024-08-16 PROCEDURE — 1200000000 HC SEMI PRIVATE

## 2024-08-16 PROCEDURE — 82140 ASSAY OF AMMONIA: CPT

## 2024-08-16 PROCEDURE — 2060000000 HC ICU INTERMEDIATE R&B

## 2024-08-16 PROCEDURE — 83735 ASSAY OF MAGNESIUM: CPT

## 2024-08-16 PROCEDURE — 36415 COLL VENOUS BLD VENIPUNCTURE: CPT

## 2024-08-16 PROCEDURE — 86140 C-REACTIVE PROTEIN: CPT

## 2024-08-16 PROCEDURE — 85652 RBC SED RATE AUTOMATED: CPT

## 2024-08-16 PROCEDURE — 80235 DRUG ASSAY LACOSAMIDE: CPT

## 2024-08-16 PROCEDURE — 70450 CT HEAD/BRAIN W/O DYE: CPT

## 2024-08-16 PROCEDURE — 93010 ELECTROCARDIOGRAM REPORT: CPT | Performed by: FAMILY MEDICINE

## 2024-08-16 PROCEDURE — 84443 ASSAY THYROID STIM HORMONE: CPT

## 2024-08-16 PROCEDURE — 99285 EMERGENCY DEPT VISIT HI MDM: CPT

## 2024-08-16 PROCEDURE — 6360000002 HC RX W HCPCS: Performed by: EMERGENCY MEDICINE

## 2024-08-16 PROCEDURE — 80307 DRUG TEST PRSMV CHEM ANLYZR: CPT

## 2024-08-16 PROCEDURE — 96374 THER/PROPH/DIAG INJ IV PUSH: CPT

## 2024-08-16 PROCEDURE — 6370000000 HC RX 637 (ALT 250 FOR IP): Performed by: EMERGENCY MEDICINE

## 2024-08-16 RX ORDER — POTASSIUM CHLORIDE 7.45 MG/ML
10 INJECTION INTRAVENOUS PRN
Status: DISCONTINUED | OUTPATIENT
Start: 2024-08-16 | End: 2024-08-27 | Stop reason: HOSPADM

## 2024-08-16 RX ORDER — POLYETHYLENE GLYCOL 3350 17 G/17G
17 POWDER, FOR SOLUTION ORAL DAILY PRN
Status: DISCONTINUED | OUTPATIENT
Start: 2024-08-16 | End: 2024-08-18

## 2024-08-16 RX ORDER — ACETAMINOPHEN 650 MG/1
650 SUPPOSITORY RECTAL EVERY 6 HOURS PRN
Status: DISCONTINUED | OUTPATIENT
Start: 2024-08-16 | End: 2024-08-27 | Stop reason: HOSPADM

## 2024-08-16 RX ORDER — SODIUM CHLORIDE 9 MG/ML
INJECTION, SOLUTION INTRAVENOUS PRN
Status: DISCONTINUED | OUTPATIENT
Start: 2024-08-16 | End: 2024-08-27 | Stop reason: HOSPADM

## 2024-08-16 RX ORDER — MAGNESIUM SULFATE IN WATER 40 MG/ML
2000 INJECTION, SOLUTION INTRAVENOUS PRN
Status: DISCONTINUED | OUTPATIENT
Start: 2024-08-16 | End: 2024-08-27 | Stop reason: HOSPADM

## 2024-08-16 RX ORDER — LACOSAMIDE 100 MG/1
200 TABLET ORAL 2 TIMES DAILY
Status: DISCONTINUED | OUTPATIENT
Start: 2024-08-16 | End: 2024-08-17

## 2024-08-16 RX ORDER — ONDANSETRON 2 MG/ML
4 INJECTION INTRAMUSCULAR; INTRAVENOUS EVERY 6 HOURS PRN
Status: DISCONTINUED | OUTPATIENT
Start: 2024-08-16 | End: 2024-08-27 | Stop reason: HOSPADM

## 2024-08-16 RX ORDER — SODIUM CHLORIDE 0.9 % (FLUSH) 0.9 %
5-40 SYRINGE (ML) INJECTION PRN
Status: DISCONTINUED | OUTPATIENT
Start: 2024-08-16 | End: 2024-08-27 | Stop reason: HOSPADM

## 2024-08-16 RX ORDER — ACETAMINOPHEN 325 MG/1
650 TABLET ORAL EVERY 6 HOURS PRN
Status: DISCONTINUED | OUTPATIENT
Start: 2024-08-16 | End: 2024-08-27 | Stop reason: HOSPADM

## 2024-08-16 RX ORDER — POTASSIUM CHLORIDE 1500 MG/1
40 TABLET, EXTENDED RELEASE ORAL PRN
Status: DISCONTINUED | OUTPATIENT
Start: 2024-08-16 | End: 2024-08-27 | Stop reason: HOSPADM

## 2024-08-16 RX ORDER — SODIUM CHLORIDE 0.9 % (FLUSH) 0.9 %
5-40 SYRINGE (ML) INJECTION EVERY 12 HOURS SCHEDULED
Status: DISCONTINUED | OUTPATIENT
Start: 2024-08-16 | End: 2024-08-27 | Stop reason: HOSPADM

## 2024-08-16 RX ORDER — LEVETIRACETAM 10 MG/ML
1000 INJECTION INTRAVASCULAR ONCE
Status: COMPLETED | OUTPATIENT
Start: 2024-08-16 | End: 2024-08-16

## 2024-08-16 RX ORDER — ONDANSETRON 4 MG/1
4 TABLET, ORALLY DISINTEGRATING ORAL EVERY 8 HOURS PRN
Status: DISCONTINUED | OUTPATIENT
Start: 2024-08-16 | End: 2024-08-27 | Stop reason: HOSPADM

## 2024-08-16 RX ORDER — LORAZEPAM 1 MG/1
1 TABLET ORAL ONCE
Status: COMPLETED | OUTPATIENT
Start: 2024-08-16 | End: 2024-08-16

## 2024-08-16 RX ORDER — ENOXAPARIN SODIUM 100 MG/ML
30 INJECTION SUBCUTANEOUS 2 TIMES DAILY
Status: DISCONTINUED | OUTPATIENT
Start: 2024-08-16 | End: 2024-08-17

## 2024-08-16 RX ORDER — LORAZEPAM 2 MG/ML
2 INJECTION INTRAMUSCULAR EVERY 5 MIN PRN
Status: DISCONTINUED | OUTPATIENT
Start: 2024-08-16 | End: 2024-08-27 | Stop reason: HOSPADM

## 2024-08-16 RX ADMIN — LEVETIRACETAM 1000 MG: 10 INJECTION INTRAVENOUS at 13:34

## 2024-08-16 RX ADMIN — LORAZEPAM 1 MG: 1 TABLET ORAL at 15:11

## 2024-08-16 NOTE — ED NOTES
Report called to JAEL Polo at [facility]. Reviewed pt. Plan of care & answered all questions.

## 2024-08-16 NOTE — ED NOTES
[facility] called with neuro from [facility]'s on line to speak with Dr. Riddle

## 2024-08-16 NOTE — ED PROVIDER NOTES
[facility]  EMERGENCY DEPARTMENT ENCOUNTER      Pt Name: Yuriy Whelan  MRN: [id number]  Birthdate 1995  Date of evaluation: 8/16/2024  Provider: Abdelrahman Riddle MD    CHIEF COMPLAINT       Chief Complaint   Patient presents with    Altered Mental Status     Per staff patient has been having issues walking, sitting up and feeding self.  Patient in ED yesterday for seizure activity.         HISTORY OF PRESENT ILLNESS      Yuriy Whelan is a 28 y.o. male who presents to the emergency department by EMS for evaluation of altered mental status.  Was seen in this emergency department for evaluation of a head injury following a seizure.  An unremarkable workup the patient was discharged back to [facility] with a plan to increase his dose of Vimpat.    Staff at [facility] report that he seems different than his normal.  Per them, he seems somewhat scared.  They also note that his motor skills and coordination seem \"off\" compared to normal and that they are having to help him walk, which is abnormal for him.    No additional observed seizure activity.  No evident syncopal episodes.    Patient has had an appetite and had breakfast without any issue today, though they state that coordination with respect to feeding himself was different than normal.    Staff denies any evident pain concerns.    Patient is nonverbal/autistic and unable to provide any additional history.    PAST MEDICAL HISTORY     Past Medical History:   Diagnosis Date    Asthma     Autism disorder     Bipolar disorder (HCC)     Collar bone fracture     Constipation     Depressive disorder     GERD (gastroesophageal reflux disease)     Insomnia     Non-verbal learning disorder     Seizures (HCC)     as of 2- last seziure was 2007    Urinary frequency          SURGICAL HISTORY       History reviewed. No pertinent surgical history.      CURRENT MEDICATIONS       Previous Medications    ACETAMINOPHEN (TYLENOL) 325 MG TABLET    Take 2 tablets by mouth every 4 hours as

## 2024-08-17 LAB
ALBUMIN SERPL-MCNC: 4.1 G/DL (ref 3.5–5.2)
ALBUMIN/GLOB SERPL: 2 {RATIO} (ref 1–2.5)
ALP SERPL-CCNC: 92 U/L (ref 40–129)
ALT SERPL-CCNC: 66 U/L (ref 10–50)
AMMONIA PLAS-SCNC: 32 UMOL/L (ref 16–60)
AMPHET UR QL SCN: NEGATIVE
ANION GAP SERPL CALCULATED.3IONS-SCNC: 11 MMOL/L (ref 9–16)
AST SERPL-CCNC: 47 U/L (ref 10–50)
BARBITURATES UR QL SCN: NEGATIVE
BASOPHILS # BLD: <0.03 K/UL (ref 0–0.2)
BASOPHILS NFR BLD: 0 % (ref 0–2)
BENZODIAZ UR QL: POSITIVE
BILIRUB DIRECT SERPL-MCNC: 0.6 MG/DL (ref 0–0.2)
BILIRUB INDIRECT SERPL-MCNC: 0.7 MG/DL (ref 0–1)
BILIRUB SERPL-MCNC: 1.2 MG/DL (ref 0–1.2)
BILIRUB UR QL STRIP: NEGATIVE
BUN SERPL-MCNC: 13 MG/DL (ref 6–20)
CALCIUM SERPL-MCNC: 8.8 MG/DL (ref 8.6–10.4)
CANNABINOIDS UR QL SCN: NEGATIVE
CHLORIDE SERPL-SCNC: 105 MMOL/L (ref 98–107)
CLARITY UR: CLEAR
CO2 SERPL-SCNC: 23 MMOL/L (ref 20–31)
COCAINE UR QL SCN: NEGATIVE
COLOR UR: YELLOW
COMMENT: ABNORMAL
CREAT SERPL-MCNC: 0.8 MG/DL (ref 0.7–1.2)
CRP SERPL HS-MCNC: 71.6 MG/L (ref 0–5)
EOSINOPHIL # BLD: <0.03 K/UL (ref 0–0.44)
EOSINOPHILS RELATIVE PERCENT: 0 % (ref 1–4)
ERYTHROCYTE [DISTWIDTH] IN BLOOD BY AUTOMATED COUNT: 14.3 % (ref 11.8–14.4)
FENTANYL UR QL: NEGATIVE
GFR, ESTIMATED: >90 ML/MIN/1.73M2
GLOBULIN SER CALC-MCNC: 2.5 G/DL
GLUCOSE SERPL-MCNC: 91 MG/DL (ref 74–99)
GLUCOSE UR STRIP-MCNC: NEGATIVE MG/DL
HCT VFR BLD AUTO: 38.6 % (ref 40.7–50.3)
HGB BLD-MCNC: 12.6 G/DL (ref 13–17)
HGB UR QL STRIP.AUTO: NEGATIVE
IMM GRANULOCYTES # BLD AUTO: <0.03 K/UL (ref 0–0.3)
IMM GRANULOCYTES NFR BLD: 0 %
KETONES UR STRIP-MCNC: ABNORMAL MG/DL
LEUKOCYTE ESTERASE UR QL STRIP: NEGATIVE
LYMPHOCYTES NFR BLD: 1.01 K/UL (ref 1.1–3.7)
LYMPHOCYTES RELATIVE PERCENT: 16 % (ref 24–43)
MCH RBC QN AUTO: 28.3 PG (ref 25.2–33.5)
MCHC RBC AUTO-ENTMCNC: 32.6 G/DL (ref 28.4–34.8)
MCV RBC AUTO: 86.5 FL (ref 82.6–102.9)
METHADONE UR QL: NEGATIVE
MONOCYTES NFR BLD: 0.73 K/UL (ref 0.1–1.2)
MONOCYTES NFR BLD: 12 % (ref 3–12)
NEUTROPHILS NFR BLD: 72 % (ref 36–65)
NEUTS SEG NFR BLD: 4.41 K/UL (ref 1.5–8.1)
NITRITE UR QL STRIP: NEGATIVE
NRBC BLD-RTO: 0 PER 100 WBC
OPIATES UR QL SCN: NEGATIVE
OXYCODONE UR QL SCN: NEGATIVE
PCP UR QL SCN: POSITIVE
PH UR STRIP: 6.5 [PH] (ref 5–8)
PLATELET # BLD AUTO: 151 K/UL (ref 138–453)
PMV BLD AUTO: 9.9 FL (ref 8.1–13.5)
POTASSIUM SERPL-SCNC: 4.3 MMOL/L (ref 3.7–5.3)
PROT SERPL-MCNC: 6.6 G/DL (ref 6.6–8.7)
PROT UR STRIP-MCNC: NEGATIVE MG/DL
RBC # BLD AUTO: 4.46 M/UL (ref 4.21–5.77)
SODIUM SERPL-SCNC: 139 MMOL/L (ref 136–145)
SP GR UR STRIP: 1.01 (ref 1–1.03)
TEST INFORMATION: ABNORMAL
UROBILINOGEN UR STRIP-ACNC: NORMAL EU/DL (ref 0–1)
WBC OTHER # BLD: 6.2 K/UL (ref 3.5–11.3)

## 2024-08-17 PROCEDURE — 92526 ORAL FUNCTION THERAPY: CPT

## 2024-08-17 PROCEDURE — 99223 1ST HOSP IP/OBS HIGH 75: CPT | Performed by: PSYCHIATRY & NEUROLOGY

## 2024-08-17 PROCEDURE — 51798 US URINE CAPACITY MEASURE: CPT

## 2024-08-17 PROCEDURE — C9254 INJECTION, LACOSAMIDE: HCPCS

## 2024-08-17 PROCEDURE — 2060000000 HC ICU INTERMEDIATE R&B

## 2024-08-17 PROCEDURE — 6360000002 HC RX W HCPCS

## 2024-08-17 PROCEDURE — 85025 COMPLETE CBC W/AUTO DIFF WBC: CPT

## 2024-08-17 PROCEDURE — 2580000003 HC RX 258

## 2024-08-17 PROCEDURE — 80048 BASIC METABOLIC PNL TOTAL CA: CPT

## 2024-08-17 PROCEDURE — 92610 EVALUATE SWALLOWING FUNCTION: CPT

## 2024-08-17 PROCEDURE — 36415 COLL VENOUS BLD VENIPUNCTURE: CPT

## 2024-08-17 RX ORDER — LORAZEPAM 2 MG/ML
1 INJECTION INTRAMUSCULAR
Status: ACTIVE | OUTPATIENT
Start: 2024-08-17 | End: 2024-08-18

## 2024-08-17 RX ORDER — ENOXAPARIN SODIUM 100 MG/ML
40 INJECTION SUBCUTANEOUS DAILY
Status: DISCONTINUED | OUTPATIENT
Start: 2024-08-17 | End: 2024-08-27 | Stop reason: HOSPADM

## 2024-08-17 RX ORDER — AMMONIUM LACTATE 12 G/100G
CREAM TOPICAL DAILY
COMMUNITY

## 2024-08-17 RX ORDER — HALOPERIDOL 2 MG/ML
1 SOLUTION ORAL
Status: ON HOLD | COMMUNITY
End: 2024-08-27 | Stop reason: HOSPADM

## 2024-08-17 RX ORDER — LACOSAMIDE 10 MG/ML
200 INJECTION, SOLUTION INTRAVENOUS 2 TIMES DAILY
Status: DISCONTINUED | OUTPATIENT
Start: 2024-08-17 | End: 2024-08-27 | Stop reason: HOSPADM

## 2024-08-17 RX ADMIN — ENOXAPARIN SODIUM 40 MG: 100 INJECTION SUBCUTANEOUS at 09:46

## 2024-08-17 RX ADMIN — LACOSAMIDE 200 MG: 10 INJECTION INTRAVENOUS at 20:34

## 2024-08-17 RX ADMIN — SODIUM CHLORIDE, PRESERVATIVE FREE 10 ML: 5 INJECTION INTRAVENOUS at 10:00

## 2024-08-17 RX ADMIN — SODIUM CHLORIDE, PRESERVATIVE FREE 10 ML: 5 INJECTION INTRAVENOUS at 02:31

## 2024-08-17 RX ADMIN — LACOSAMIDE 200 MG: 10 INJECTION INTRAVENOUS at 09:46

## 2024-08-17 RX ADMIN — SODIUM CHLORIDE, PRESERVATIVE FREE 10 ML: 5 INJECTION INTRAVENOUS at 20:40

## 2024-08-17 RX ADMIN — LACOSAMIDE 200 MG: 10 INJECTION INTRAVENOUS at 02:31

## 2024-08-17 NOTE — H&P
[facility] Neurology   IN-PATIENT SERVICE   [facility]    HISTORY AND PHYSICAL EXAMINATION            Date:   8/16/2024  Patient name:  Yuriy Whelan  Date of admission:  8/16/2024  7:30 PM  MRN:   [id number]  Account:  [id number]  YOB: 1995  PCP:    Keshawn Carranza MD  Room:   [address] [place] [address]  Code Status:    Full Code    Chief Complaint:     Seizures    History Obtained From:     patient    History of Present Illness:     28-year-old male with past medical history of autism, intellectual disability, epilepsy lives at a group home transferred from [facility] with concern of 1 episode of generalized tonic-clonic seizure yesterday.  Patient had 1 episode of seizure yesterday and was taken to [facility].  He was taking Vimpat 150 twice daily group home, which was increased to 200 Mg twice daily in the ED and was discharged back to the group home from the ED. after the discharge from the hospital, patient was not acting like himself.  He was not following commands, not to using the restroom by himself and was not able to eat by himself which is quite new for the patient as he used to do all those activities by himself before the seizure episode.  He returned back to emergency [facility] and was transferred to us.    Patient had a history of epilepsy and was maintained on Vimpat 150 mg twice daily.  As per the caregiver, the patient did not have any seizure-like episode in the past 3 to 4 years.    On exam today, patient is alert but nonverbal which is his baseline.He followed simple commands and was able to track the fingers and lift his legs up and down when asked.      Past Medical History:     Past Medical History:   Diagnosis Date   • Asthma    • Autism disorder    • Bipolar disorder (HCC)    • Collar bone fracture    • Constipation    • Depressive disorder    • GERD (gastroesophageal reflux disease)    • Insomnia    • Non-verbal learning disorder    • Seizures ([facility])   Seizure (HCC) [R56.9] 08/16/2024       28 years old male with past medical history of epilepsy transferred from [facility] with 1 episode of breakthrough seizures and not acting like himself.    Plan:     Patient status Admit as inpatient in the  Progressive Unit/Step down    Breakthrough seizures  -MRI brain ordered  -EEG  -Metabolic workup: CMP, UA, UDS, ammonia  -Follow-up serum Vimpat level  -Continue Vimpat 200 mg twice daily  -Ativan as needed for seizures  -Seizure precautions  -Fall precautions    Consultations:   None      Follow-up further recommendations after discussing the case with attending  The plan was discussed with the patient, patient's family and the medical staff.     Patient is admitted as inpatient status because of co-morbidities listed above, severity of signs and symptoms as outlined, requirement for current medical therapies and most importantly because of direct risk to patient if care not provided in a hospital setting.    Antonio Costa MD  Neurology Resident PGY-2  8/16/2024  10:43 PM    Copy sent to Keshawn Alcantar MD

## 2024-08-17 NOTE — PROGRESS NOTES
Speech Language Pathology  Clinical Bedside Swallow Assessment and Treatment   Facility/Department: [facility] 1C STEPDOWN      Recommendations:  Diet recommendation: IDDSI 7 Regular Solids; IDDSI 0 Thin Liquids; Meds PO as tolerated  Compensatory Strategies: upright for all intake, stay upright for at least 30 mins after intake, small bites/sips, total feed, alternate bites/sips, slow rate of intake, and general aspiration precautions    No instrumental assessment warranted at this time as mechanical swallowing impairment is not suspected. Previous reported difficulties/concerns regarding swallowing were likely behavioral. Pt has baseline MRDD. Typically able to self feed but is unable to on this date. Okay for regular texture diet and thin liquids with assistance. ST will follow up prior to discharge to monitor diet tolerance.       NAME:Yuriy Whelan  : 1995 (28 y.o.)   MRN: [id number]  ROOM: [facility]0145-  ADMISSION DATE: 2024  PATIENT DIAGNOSIS(ES): Altered mental status [R41.82]  Seizure (HCC) [R56.9]  No chief complaint on file.    Patient Active Problem List    Diagnosis Date Noted    Altered mental status 2024    Seizure (HCC) 2024    Urinary incontinence without sensory awareness 2024    MR (mental retardation) 2016    Rage 2016    Spells 2016    Seizures (HCC) 2016    Sore throat 2012    Finger pain 2012    Foot swelling 2011     Past Medical History:   Diagnosis Date    Asthma     Autism disorder     Bipolar disorder (HCC)     Collar bone fracture     Constipation     Depressive disorder     GERD (gastroesophageal reflux disease)     Insomnia     Non-verbal learning disorder     Seizures (HCC)     as of 2016 last seziure was     Urinary frequency      No Known Allergies    Date of Evaluation: 2024   Evaluating Therapist: SANA Mobley    Impressions:  Patient presents with functional swallow using compensatory strategies  instruction completed with patient re: evidenced based practice regarding recommendations and POC, importance of oral care to reduce adverse affects in the event of aspiration, and instruction of recommended compensatory strategies developed based upon clinical exam. Pt able to recall/demonstrate compensatory strategies with max cues. ST provided education to caregiver regarding compensatory strategies including sitting pt upright for all meals and remaining elevated for 30 min after. Caregiver receptive to SLP recommendations.     Speech Therapy Prognosis  Prognosis: Excellent  Prognosis Considerations: Age;Severity of Impairments  Individuals consulted  Consulted and agree with results and recommendations: RN;Other (add comment) (caregiver)    Recommended Intervention:   [x] Dysphagia tx  [] Videostroboscopy                      [] NPO   [] MBS       [] Speech/Cog Eval    [x] Therapeutic PO Trials     [] Ice Chips   [] Other:  [] FEES         Duration/Frequency of Tx: until discharge or as deemed appropriate by consulting SLP, 1-2x/week  Recommendations/Treat  Requires SLP Intervention: Yes  D/C Recommendations: Ongoing speech therapy is recommended during this hospitalization  Compensatory Swallowing Strategies : Alternate solids and liquids;Small bites/sips;Remain upright for 30-45 minutes after meals;Assist feed  Therapeutic Interventions: Patient/Family education  Patient Education: purpose of exam and recommendations  Patient Education Response: No evidence of learning    Treatment/Goals  Short-term Goals  Goal 1: Pt will tolerate LRD without overt s/s of aspiration.      Safety Devices / Report:  [x]  All fall risk precautions in place [x]  Safety handoff completed with RN  []  Bed alarm in place  []  Left in bed     []  Chair alarm in place  []  Left in chair   []  Call light in reach   []  Other:                    Total Treatment Time / Charges   SLP Individual Minutes  Time In: 0855  Time Out:

## 2024-08-18 ENCOUNTER — APPOINTMENT (OUTPATIENT)
Dept: MRI IMAGING | Age: 29
DRG: 101 | End: 2024-08-18
Attending: PSYCHIATRY & NEUROLOGY
Payer: MEDICARE

## 2024-08-18 ENCOUNTER — APPOINTMENT (OUTPATIENT)
Dept: GENERAL RADIOLOGY | Age: 29
DRG: 101 | End: 2024-08-18
Attending: PSYCHIATRY & NEUROLOGY
Payer: MEDICARE

## 2024-08-18 LAB
ANION GAP SERPL CALCULATED.3IONS-SCNC: 9 MMOL/L (ref 9–16)
BACTERIA URNS QL MICRO: ABNORMAL
BASOPHILS # BLD: <0.03 K/UL (ref 0–0.2)
BASOPHILS NFR BLD: 0 % (ref 0–2)
BILIRUB UR QL STRIP: ABNORMAL
BUN SERPL-MCNC: 12 MG/DL (ref 6–20)
CALCIUM SERPL-MCNC: 9 MG/DL (ref 8.6–10.4)
CASTS #/AREA URNS LPF: ABNORMAL /LPF (ref 0–8)
CHLORIDE SERPL-SCNC: 106 MMOL/L (ref 98–107)
CLARITY UR: CLEAR
CO2 SERPL-SCNC: 23 MMOL/L (ref 20–31)
COLOR UR: ABNORMAL
CREAT SERPL-MCNC: 0.8 MG/DL (ref 0.7–1.2)
EOSINOPHIL # BLD: <0.03 K/UL (ref 0–0.44)
EOSINOPHILS RELATIVE PERCENT: 0 % (ref 1–4)
EPI CELLS #/AREA URNS HPF: ABNORMAL /HPF (ref 0–5)
ERYTHROCYTE [DISTWIDTH] IN BLOOD BY AUTOMATED COUNT: 14 % (ref 11.8–14.4)
GFR, ESTIMATED: >90 ML/MIN/1.73M2
GLUCOSE BLD-MCNC: 105 MG/DL (ref 75–110)
GLUCOSE SERPL-MCNC: 108 MG/DL (ref 74–99)
GLUCOSE UR STRIP-MCNC: NEGATIVE MG/DL
HCT VFR BLD AUTO: 37.6 % (ref 40.7–50.3)
HGB BLD-MCNC: 12.4 G/DL (ref 13–17)
HGB UR QL STRIP.AUTO: NEGATIVE
IMM GRANULOCYTES # BLD AUTO: <0.03 K/UL (ref 0–0.3)
IMM GRANULOCYTES NFR BLD: 0 %
KETONES UR STRIP-MCNC: ABNORMAL MG/DL
LACTIC ACID, WHOLE BLOOD: 0.7 MMOL/L (ref 0.7–2.1)
LACTIC ACID, WHOLE BLOOD: 1 MMOL/L (ref 0.7–2.1)
LEUKOCYTE ESTERASE UR QL STRIP: ABNORMAL
LYMPHOCYTES NFR BLD: 0.89 K/UL (ref 1.1–3.7)
LYMPHOCYTES RELATIVE PERCENT: 15 % (ref 24–43)
MCH RBC QN AUTO: 28.6 PG (ref 25.2–33.5)
MCHC RBC AUTO-ENTMCNC: 33 G/DL (ref 28.4–34.8)
MCV RBC AUTO: 86.8 FL (ref 82.6–102.9)
MONOCYTES NFR BLD: 0.67 K/UL (ref 0.1–1.2)
MONOCYTES NFR BLD: 11 % (ref 3–12)
NEUTROPHILS NFR BLD: 74 % (ref 36–65)
NEUTS SEG NFR BLD: 4.44 K/UL (ref 1.5–8.1)
NITRITE UR QL STRIP: NEGATIVE
NRBC BLD-RTO: 0 PER 100 WBC
PH UR STRIP: 5.5 [PH] (ref 5–8)
PLATELET # BLD AUTO: 158 K/UL (ref 138–453)
PMV BLD AUTO: 9.2 FL (ref 8.1–13.5)
POTASSIUM SERPL-SCNC: 4 MMOL/L (ref 3.7–5.3)
PROT UR STRIP-MCNC: ABNORMAL MG/DL
RBC # BLD AUTO: 4.33 M/UL (ref 4.21–5.77)
RBC #/AREA URNS HPF: ABNORMAL /HPF (ref 0–4)
SODIUM SERPL-SCNC: 138 MMOL/L (ref 136–145)
SP GR UR STRIP: 1.02 (ref 1–1.03)
UROBILINOGEN UR STRIP-ACNC: ABNORMAL EU/DL (ref 0–1)
WBC #/AREA URNS HPF: ABNORMAL /HPF (ref 0–5)
WBC OTHER # BLD: 6 K/UL (ref 3.5–11.3)

## 2024-08-18 PROCEDURE — 36415 COLL VENOUS BLD VENIPUNCTURE: CPT

## 2024-08-18 PROCEDURE — 95819 EEG AWAKE AND ASLEEP: CPT | Performed by: PSYCHIATRY & NEUROLOGY

## 2024-08-18 PROCEDURE — 74018 RADEX ABDOMEN 1 VIEW: CPT

## 2024-08-18 PROCEDURE — 80048 BASIC METABOLIC PNL TOTAL CA: CPT

## 2024-08-18 PROCEDURE — C9254 INJECTION, LACOSAMIDE: HCPCS

## 2024-08-18 PROCEDURE — 6360000002 HC RX W HCPCS

## 2024-08-18 PROCEDURE — 99222 1ST HOSP IP/OBS MODERATE 55: CPT | Performed by: INTERNAL MEDICINE

## 2024-08-18 PROCEDURE — 70551 MRI BRAIN STEM W/O DYE: CPT

## 2024-08-18 PROCEDURE — 83605 ASSAY OF LACTIC ACID: CPT

## 2024-08-18 PROCEDURE — 2060000000 HC ICU INTERMEDIATE R&B

## 2024-08-18 PROCEDURE — 51702 INSERT TEMP BLADDER CATH: CPT

## 2024-08-18 PROCEDURE — 81001 URINALYSIS AUTO W/SCOPE: CPT

## 2024-08-18 PROCEDURE — 95816 EEG AWAKE AND DROWSY: CPT

## 2024-08-18 PROCEDURE — 99232 SBSQ HOSP IP/OBS MODERATE 35: CPT | Performed by: PSYCHIATRY & NEUROLOGY

## 2024-08-18 PROCEDURE — 6370000000 HC RX 637 (ALT 250 FOR IP): Performed by: PSYCHIATRY & NEUROLOGY

## 2024-08-18 PROCEDURE — 2580000003 HC RX 258

## 2024-08-18 PROCEDURE — 6370000000 HC RX 637 (ALT 250 FOR IP): Performed by: INTERNAL MEDICINE

## 2024-08-18 PROCEDURE — 85025 COMPLETE CBC W/AUTO DIFF WBC: CPT

## 2024-08-18 PROCEDURE — 82947 ASSAY GLUCOSE BLOOD QUANT: CPT

## 2024-08-18 PROCEDURE — 6370000000 HC RX 637 (ALT 250 FOR IP)

## 2024-08-18 RX ORDER — CHLORPROMAZINE HYDROCHLORIDE 25 MG/1
200 TABLET, FILM COATED ORAL DAILY
Status: DISCONTINUED | OUTPATIENT
Start: 2024-08-19 | End: 2024-08-18

## 2024-08-18 RX ORDER — LORAZEPAM 2 MG/ML
1 INJECTION INTRAMUSCULAR ONCE
Status: COMPLETED | OUTPATIENT
Start: 2024-08-18 | End: 2024-08-18

## 2024-08-18 RX ORDER — CLONIDINE HYDROCHLORIDE 0.1 MG/1
0.1 TABLET ORAL 2 TIMES DAILY
Status: DISCONTINUED | OUTPATIENT
Start: 2024-08-18 | End: 2024-08-27 | Stop reason: HOSPADM

## 2024-08-18 RX ORDER — CLONIDINE HYDROCHLORIDE 0.1 MG/1
0.2 TABLET ORAL NIGHTLY
Status: DISCONTINUED | OUTPATIENT
Start: 2024-08-18 | End: 2024-08-18

## 2024-08-18 RX ORDER — TROSPIUM CHLORIDE 20 MG/1
20 TABLET, FILM COATED ORAL
Status: DISCONTINUED | OUTPATIENT
Start: 2024-08-18 | End: 2024-08-27 | Stop reason: HOSPADM

## 2024-08-18 RX ORDER — CLONIDINE HYDROCHLORIDE 0.1 MG/1
0.2 TABLET ORAL NIGHTLY
Status: DISCONTINUED | OUTPATIENT
Start: 2024-08-18 | End: 2024-08-27 | Stop reason: HOSPADM

## 2024-08-18 RX ORDER — CHLORPROMAZINE HYDROCHLORIDE 25 MG/1
50 TABLET, FILM COATED ORAL DAILY
Status: DISCONTINUED | OUTPATIENT
Start: 2024-08-19 | End: 2024-08-27 | Stop reason: HOSPADM

## 2024-08-18 RX ORDER — CHLORPROMAZINE HYDROCHLORIDE 100 MG/1
200 TABLET, FILM COATED ORAL DAILY
Status: DISCONTINUED | OUTPATIENT
Start: 2024-08-19 | End: 2024-08-27 | Stop reason: HOSPADM

## 2024-08-18 RX ORDER — OLANZAPINE 10 MG/1
10 TABLET ORAL NIGHTLY
Status: DISCONTINUED | OUTPATIENT
Start: 2024-08-19 | End: 2024-08-27 | Stop reason: HOSPADM

## 2024-08-18 RX ORDER — CHLORPROMAZINE HYDROCHLORIDE 25 MG/1
600 TABLET, FILM COATED ORAL DAILY
Status: DISCONTINUED | OUTPATIENT
Start: 2024-08-18 | End: 2024-08-18

## 2024-08-18 RX ORDER — OLANZAPINE 10 MG/1
10 TABLET ORAL 2 TIMES DAILY
Status: DISCONTINUED | OUTPATIENT
Start: 2024-08-18 | End: 2024-08-18

## 2024-08-18 RX ORDER — POLYETHYLENE GLYCOL 3350 17 G/17G
17 POWDER, FOR SOLUTION ORAL DAILY
Status: DISCONTINUED | OUTPATIENT
Start: 2024-08-18 | End: 2024-08-19

## 2024-08-18 RX ORDER — CHLORPROMAZINE HYDROCHLORIDE 25 MG/1
50 TABLET, FILM COATED ORAL DAILY
Status: DISCONTINUED | OUTPATIENT
Start: 2024-08-18 | End: 2024-08-18

## 2024-08-18 RX ORDER — CHLORPROMAZINE HYDROCHLORIDE 100 MG/1
300 TABLET, FILM COATED ORAL NIGHTLY
Status: DISCONTINUED | OUTPATIENT
Start: 2024-08-18 | End: 2024-08-27 | Stop reason: HOSPADM

## 2024-08-18 RX ORDER — CHLORPROMAZINE HYDROCHLORIDE 25 MG/1
100 TABLET, FILM COATED ORAL
Status: DISCONTINUED | OUTPATIENT
Start: 2024-08-18 | End: 2024-08-18

## 2024-08-18 RX ORDER — CHLORPROMAZINE HYDROCHLORIDE 100 MG/1
100 TABLET, FILM COATED ORAL
Status: DISCONTINUED | OUTPATIENT
Start: 2024-08-19 | End: 2024-08-27 | Stop reason: HOSPADM

## 2024-08-18 RX ORDER — CHLORPROMAZINE HYDROCHLORIDE 25 MG/1
100 TABLET, FILM COATED ORAL DAILY
Status: DISCONTINUED | OUTPATIENT
Start: 2024-08-18 | End: 2024-08-18

## 2024-08-18 RX ORDER — CHLORPROMAZINE HYDROCHLORIDE 25 MG/1
300 TABLET, FILM COATED ORAL NIGHTLY
Status: DISCONTINUED | OUTPATIENT
Start: 2024-08-18 | End: 2024-08-18

## 2024-08-18 RX ORDER — POLYETHYLENE GLYCOL 3350 17 G/17G
238 POWDER, FOR SOLUTION ORAL ONCE
Status: COMPLETED | OUTPATIENT
Start: 2024-08-18 | End: 2024-08-18

## 2024-08-18 RX ORDER — LACTULOSE 10 G/15ML
20 SOLUTION ORAL 2 TIMES DAILY
Status: DISCONTINUED | OUTPATIENT
Start: 2024-08-18 | End: 2024-08-19

## 2024-08-18 RX ORDER — OLANZAPINE 15 MG/1
15 TABLET ORAL DAILY
Status: DISCONTINUED | OUTPATIENT
Start: 2024-08-18 | End: 2024-08-27 | Stop reason: HOSPADM

## 2024-08-18 RX ADMIN — FLUOXETINE HYDROCHLORIDE 40 MG: 20 CAPSULE ORAL at 12:37

## 2024-08-18 RX ADMIN — POLYETHYLENE GLYCOL 3350 238 G: 17 POWDER, FOR SOLUTION ORAL at 11:38

## 2024-08-18 RX ADMIN — LORAZEPAM 1 MG: 2 INJECTION INTRAMUSCULAR; INTRAVENOUS at 18:13

## 2024-08-18 RX ADMIN — CHLORPROMAZINE HYDROCHLORIDE 300 MG: 100 TABLET, FILM COATED ORAL at 21:04

## 2024-08-18 RX ADMIN — CLONIDINE HYDROCHLORIDE 0.2 MG: 0.1 TABLET ORAL at 21:04

## 2024-08-18 RX ADMIN — LACTULOSE 20 G: 20 SOLUTION ORAL at 21:02

## 2024-08-18 RX ADMIN — ENOXAPARIN SODIUM 40 MG: 100 INJECTION SUBCUTANEOUS at 09:14

## 2024-08-18 RX ADMIN — TROSPIUM CHLORIDE 20 MG: 20 TABLET, FILM COATED ORAL at 16:59

## 2024-08-18 RX ADMIN — Medication 240 ML: at 11:17

## 2024-08-18 RX ADMIN — LACOSAMIDE 200 MG: 10 INJECTION INTRAVENOUS at 20:47

## 2024-08-18 RX ADMIN — OLANZAPINE 15 MG: 10 TABLET, FILM COATED ORAL at 12:37

## 2024-08-18 RX ADMIN — LACTULOSE 20 G: 20 SOLUTION ORAL at 12:37

## 2024-08-18 RX ADMIN — SODIUM CHLORIDE, PRESERVATIVE FREE 10 ML: 5 INJECTION INTRAVENOUS at 09:14

## 2024-08-18 RX ADMIN — POLYETHYLENE GLYCOL 3350 17 G: 17 POWDER, FOR SOLUTION ORAL at 09:36

## 2024-08-18 RX ADMIN — CHLORPROMAZINE HYDROCHLORIDE 100 MG: 25 TABLET, FILM COATED ORAL at 12:37

## 2024-08-18 RX ADMIN — CLONIDINE HYDROCHLORIDE 0.1 MG: 0.1 TABLET ORAL at 12:39

## 2024-08-18 RX ADMIN — Medication 1000 MG: at 11:35

## 2024-08-18 RX ADMIN — Medication 240 ML: at 22:01

## 2024-08-18 RX ADMIN — SODIUM CHLORIDE, PRESERVATIVE FREE 10 ML: 5 INJECTION INTRAVENOUS at 20:54

## 2024-08-18 RX ADMIN — LACOSAMIDE 200 MG: 10 INJECTION INTRAVENOUS at 08:59

## 2024-08-18 NOTE — CONSULTS
follow-up at urologist in [place] per family's request as [place] system is too far for them at this time.  - No further intervention from urology team  - Further care per primary team        Thank you for involving us in the care of Yuriy Whelan. Should you have any questions, please do not hesitate to contact us at any time.    Josh,  Devonte Capone MD PGY5  Urology

## 2024-08-18 NOTE — PROGRESS NOTES
Cells, UA None 0 - 5 /HPF    Bacteria, UA FEW (A) None   Basic Metabolic Panel w/ Reflex to MG    Collection Time: 08/18/24  6:33 AM   Result Value Ref Range    Sodium 138 136 - 145 mmol/L    Potassium 4.0 3.7 - 5.3 mmol/L    Chloride 106 98 - 107 mmol/L    CO2 23 20 - 31 mmol/L    Anion Gap 9 9 - 16 mmol/L    Glucose 108 (H) 74 - 99 mg/dL    BUN 12 6 - 20 mg/dL    Creatinine 0.8 0.70 - 1.20 mg/dL    Est, Glom Filt Rate >90 >60 mL/min/1.73m2    Calcium 9.0 8.6 - 10.4 mg/dL   CBC with Auto Differential    Collection Time: 08/18/24  6:33 AM   Result Value Ref Range    WBC 6.0 3.5 - 11.3 k/uL    RBC 4.33 4.21 - 5.77 m/uL    Hemoglobin 12.4 (L) 13.0 - 17.0 g/dL    Hematocrit 37.6 (L) 40.7 - 50.3 %    MCV 86.8 82.6 - 102.9 fL    MCH 28.6 25.2 - 33.5 pg    MCHC 33.0 28.4 - 34.8 g/dL    RDW 14.0 11.8 - 14.4 %    Platelets 158 138 - 453 k/uL    MPV 9.2 8.1 - 13.5 fL    NRBC Automated 0.0 0.0 per 100 WBC    Neutrophils % 74 (H) 36 - 65 %    Lymphocytes % 15 (L) 24 - 43 %    Monocytes % 11 3 - 12 %    Eosinophils % 0 (L) 1 - 4 %    Basophils % 0 0 - 2 %    Immature Granulocytes % 0 0 %    Neutrophils Absolute 4.44 1.50 - 8.10 k/uL    Lymphocytes Absolute 0.89 (L) 1.10 - 3.70 k/uL    Monocytes Absolute 0.67 0.10 - 1.20 k/uL    Eosinophils Absolute <0.03 0.00 - 0.44 k/uL    Basophils Absolute <0.03 0.00 - 0.20 k/uL    Immature Granulocytes Absolute <0.03 0.00 - 0.30 k/uL         Radiology:    MRI Brain without contrast:    EEG:    CT head without contrast:    CT Angiogram of the head and neck:    Assessment & Plan     Impression: Yuriy Whelan is a 28 y.o. male with a history of autism, intellectual disability, nonverbal at baseline, and epilepsy who presented with a breakthrough seizure. Etiology of breakthrough seizure is likely 2/2 UTI vs drug ingestion (UDS positive for phencyclidine)     Breakthrough Seizure 2/2 phencyclidine intoxication vs UTI  Continue vimpat 200 mg p.o. BID  MRI brain pending  Routine EEG

## 2024-08-18 NOTE — PLAN OF CARE
Problem: Discharge Planning  Goal: Discharge to home or other facility with appropriate resources  8/18/2024 1836 by Lucina Nguyen RN  Outcome: Progressing  Flowsheets (Taken 8/18/2024 0800)  Discharge to home or other facility with appropriate resources: Identify barriers to discharge with patient and caregiver  8/18/2024 0605 by Doreen Nova RN  Outcome: Progressing  Flowsheets (Taken 8/17/2024 2000)  Discharge to home or other facility with appropriate resources: Identify barriers to discharge with patient and caregiver     Problem: Neurosensory - Adult  Goal: Achieves stable or improved neurological status  8/18/2024 1836 by Lucina Nguyen RN  Outcome: Progressing  Flowsheets (Taken 8/18/2024 0800)  Achieves stable or improved neurological status: Assess for and report changes in neurological status  8/18/2024 0605 by Droeen Nova RN  Outcome: Progressing  Flowsheets (Taken 8/17/2024 2000)  Achieves stable or improved neurological status: Assess for and report changes in neurological status  Goal: Absence of seizures  8/18/2024 1836 by Lucina Nguyen RN  Outcome: Progressing  Flowsheets (Taken 8/18/2024 0800)  Absence of seizures: Monitor for seizure activity.  If seizure occurs, document type and location of movements and any associated apnea  8/18/2024 0605 by Doreen Nova RN  Outcome: Progressing  Flowsheets (Taken 8/17/2024 2000)  Absence of seizures: Monitor for seizure activity.  If seizure occurs, document type and location of movements and any associated apnea  Goal: Remains free of injury related to seizures activity  8/18/2024 1836 by Lucina Nguyen RN  Outcome: Progressing  Flowsheets (Taken 8/18/2024 0800)  Remains free of injury related to seizure activity: Maintain airway, patient safety  and administer oxygen as ordered  8/18/2024 0605 by Doreen Nova RN  Outcome: Progressing  Flowsheets (Taken 8/17/2024 2000)  Remains free of injury related to seizure activity: Maintain airway, patient

## 2024-08-18 NOTE — CARE COORDINATION
Case Management Assessment  Initial Evaluation    Date/Time of Evaluation: 8/18/2024 4:04 PM  Assessment Completed by: Elizabeth Salazar RN    If patient is discharged prior to next notation, then this note serves as note for discharge by case management.    Patient Name: Yuriy Whelan                   YOB: 1995  Diagnosis: Altered mental status [R41.82]  Seizure (HCC) [R56.9]                   Date / Time: 8/16/2024  7:30 PM    Patient Admission Status: Inpatient   Readmission Risk (Low < 19, Mod (19-27), High > 27): Readmission Risk Score: 16.4    Current PCP: Keshawn Carranza MD  PCP verified by CM? Yes (Dr. Keshawn Carranza)    Chart Reviewed: Yes      History Provided by: Other (see comment) (Legal guardian)  Patient Orientation: Unable to Assess    Patient Cognition: Other (see comment) (Spoke with legal guardian Pam Whelan)    Hospitalization in the last 30 days (Readmission):  No    If yes, Readmission Assessment in  Navigator will be completed.    Advance Directives:      Code Status: Full Code   Patient's Primary Decision Maker is: Legal Next of Kin      Discharge Planning:    Patient lives with: (P) Other (Comment) (Dev. center) Type of Home: (P) Other (Comment) (Dev. center)  Primary Care Giver: Other (Comment) (dev. center.)  Patient Support Systems include: Other (Comment) (group home)   Current Financial resources: (P) Medicaid, Medicare  Current community resources: (P) Other (Comment) (Dev. center)  Current services prior to admission:              Current DME:              Type of Home Care services:  (P) None    ADLS  Prior functional level: (P) Assistance with the following:, Bathing, Dressing  Current functional level: (P) Assistance with the following:, Bathing, Dressing, Toileting, Mobility    PT AM-PAC:   /24  OT AM-PAC:   /24    Family can provide assistance at DC: (P) No  Would you like Case Management to discuss the discharge plan with any other family members/significant

## 2024-08-18 NOTE — CONSULTS
[facility]    GASTROENTEROLOGY CONSULT    Patient:   Yuriy Whelan   :    1995   Facility:   [facility]   Date:    2024  Admission Dx:  Altered mental status [R41.82]  Seizure (HCC) [R56.9]  Requesting physician: Bairon Reynolds MD  Reason for consult:  Colonic ileus/distal obstruction  CC : Change in behavior    SUBJECTIVE     HISTORY OF PRESENT ILLNESS  This is a 28 y.o.  male who was admitted 2024 with Altered mental status [R41.82]  Seizure (HCC) [R56.9]. We have been asked to see the patient in consultation by Bairon Reynolds MD for colonic ileus/distal obstruction    Patient is nonverbal.  Information obtained from chart as well as patient's caregiver Jeanmarie who is at bedside.     28-year-old male with a history of bipolar disorder, autism, intellectual disability, nonverbal at baseline, seizure disorder and constipation who presented from retirement for evaluation of altered mental status/change in behavior.  Patient had KUB completed this morning showing colonic distention with cecum measuring 13 cm prompting GI consult.    Jeanmarie reports he has been 1 of the patient's caregiver for over the past 4 years    Jeanmarie reports patient was recently seen at [facility] for evaluation of head injury following a seizure.  Patient was discharged.  Staff reported patient was not acting as his typical self and seen again at [facility].  Patient was transferred to [facility] for further evaluation.    Jeanmarie reports patient is typically very active.  He has not been wanting to sit up or get up out of bed.  His abdomen is much more distended than normal.  Jeanmarie reports patient typically has a bowel movement every couple days.  When stool is soft, patient will typically smear it on himself.  If stool is hard, patient will  the form stool and throw it in the trash.  Staff  at the patient's facility monitors patient's bowel habits will

## 2024-08-18 NOTE — PLAN OF CARE
Problem: Discharge Planning  Goal: Discharge to home or other facility with appropriate resources  8/18/2024 0605 by Doreen Nova, RN  Outcome: Progressing  Flowsheets (Taken 8/17/2024 2000)  Discharge to home or other facility with appropriate resources: Identify barriers to discharge with patient and caregiver  8/17/2024 1742 by Kody Alejo, RN  Outcome: Progressing     Problem: Neurosensory - Adult  Goal: Achieves stable or improved neurological status  Outcome: Progressing  Flowsheets (Taken 8/17/2024 2000)  Achieves stable or improved neurological status: Assess for and report changes in neurological status  Goal: Absence of seizures  Outcome: Progressing  Flowsheets (Taken 8/17/2024 2000)  Absence of seizures: Monitor for seizure activity.  If seizure occurs, document type and location of movements and any associated apnea  Goal: Remains free of injury related to seizures activity  Outcome: Progressing  Flowsheets (Taken 8/17/2024 2000)  Remains free of injury related to seizure activity: Maintain airway, patient safety  and administer oxygen as ordered  Goal: Achieves maximal functionality and self care  Outcome: Progressing  Flowsheets (Taken 8/17/2024 2000)  Achieves maximal functionality and self care: Monitor swallowing and airway patency with patient fatigue and changes in neurological status     Problem: Genitourinary - Adult  Goal: Absence of urinary retention  Outcome: Progressing  Flowsheets (Taken 8/17/2024 2000)  Absence of urinary retention: Assess patient’s ability to void and empty bladder  Goal: Urinary catheter remains patent  Outcome: Progressing  Flowsheets (Taken 8/17/2024 2000)  Urinary catheter remains patent: Assess patency of urinary catheter

## 2024-08-18 NOTE — DISCHARGE INSTRUCTIONS
Follow-up with your primary care physician, psychiatrist and neurologist as advised.  Please take your medications as mentioned in the medication list.  If you develop any further episodes of seizure like shaking of body, focal weakness or numbness, worsening symptoms or any other symptoms please call 911 or visit the nearest emergency department.

## 2024-08-19 ENCOUNTER — APPOINTMENT (OUTPATIENT)
Dept: GENERAL RADIOLOGY | Age: 29
DRG: 101 | End: 2024-08-19
Attending: PSYCHIATRY & NEUROLOGY
Payer: MEDICARE

## 2024-08-19 PROBLEM — R14.0 ABDOMINAL DISTENTION: Status: ACTIVE | Noted: 2024-08-19

## 2024-08-19 PROBLEM — K63.89 COLON DISTENTION: Status: ACTIVE | Noted: 2024-08-19

## 2024-08-19 PROBLEM — K56.7 ILEUS (HCC): Status: ACTIVE | Noted: 2024-08-19

## 2024-08-19 LAB
AMPHET UR QL SCN: NEGATIVE
ANION GAP SERPL CALCULATED.3IONS-SCNC: 9 MMOL/L (ref 9–16)
BARBITURATES UR QL SCN: NEGATIVE
BASOPHILS # BLD: <0.03 K/UL (ref 0–0.2)
BASOPHILS NFR BLD: 0 % (ref 0–2)
BENZODIAZ UR QL: POSITIVE
BUN SERPL-MCNC: 12 MG/DL (ref 6–20)
CALCIUM SERPL-MCNC: 9 MG/DL (ref 8.6–10.4)
CANNABINOIDS UR QL SCN: NEGATIVE
CHLORIDE SERPL-SCNC: 105 MMOL/L (ref 98–107)
CK SERPL-CCNC: 80 U/L (ref 39–308)
CO2 SERPL-SCNC: 24 MMOL/L (ref 20–31)
COCAINE UR QL SCN: NEGATIVE
CREAT SERPL-MCNC: 0.8 MG/DL (ref 0.7–1.2)
CRP SERPL HS-MCNC: 39.1 MG/L (ref 0–5)
EOSINOPHIL # BLD: <0.03 K/UL (ref 0–0.44)
EOSINOPHILS RELATIVE PERCENT: 0 % (ref 1–4)
ERYTHROCYTE [DISTWIDTH] IN BLOOD BY AUTOMATED COUNT: 14.1 % (ref 11.8–14.4)
FENTANYL UR QL: NEGATIVE
GFR, ESTIMATED: >90 ML/MIN/1.73M2
GLUCOSE SERPL-MCNC: 112 MG/DL (ref 74–99)
HCT VFR BLD AUTO: 39.2 % (ref 40.7–50.3)
HGB BLD-MCNC: 12.8 G/DL (ref 13–17)
IMM GRANULOCYTES # BLD AUTO: <0.03 K/UL (ref 0–0.3)
IMM GRANULOCYTES NFR BLD: 0 %
LACTIC ACID, WHOLE BLOOD: 1 MMOL/L (ref 0.7–2.1)
LYMPHOCYTES NFR BLD: 1.1 K/UL (ref 1.1–3.7)
LYMPHOCYTES RELATIVE PERCENT: 18 % (ref 24–43)
MAGNESIUM SERPL-MCNC: 2.2 MG/DL (ref 1.6–2.6)
MCH RBC QN AUTO: 28.6 PG (ref 25.2–33.5)
MCHC RBC AUTO-ENTMCNC: 32.7 G/DL (ref 28.4–34.8)
MCV RBC AUTO: 87.7 FL (ref 82.6–102.9)
METHADONE UR QL: NEGATIVE
MONOCYTES NFR BLD: 0.71 K/UL (ref 0.1–1.2)
MONOCYTES NFR BLD: 11 % (ref 3–12)
MYOGLOBIN SERPL-MCNC: 414 NG/ML (ref 28–72)
NEUTROPHILS NFR BLD: 71 % (ref 36–65)
NEUTS SEG NFR BLD: 4.38 K/UL (ref 1.5–8.1)
NRBC BLD-RTO: 0 PER 100 WBC
OPIATES UR QL SCN: NEGATIVE
OXYCODONE UR QL SCN: NEGATIVE
PCP UR QL SCN: POSITIVE
PLATELET # BLD AUTO: 164 K/UL (ref 138–453)
PMV BLD AUTO: 9.6 FL (ref 8.1–13.5)
POTASSIUM SERPL-SCNC: 4.4 MMOL/L (ref 3.7–5.3)
POTASSIUM SERPL-SCNC: 4.5 MMOL/L (ref 3.7–5.3)
PROCALCITONIN SERPL-MCNC: 0.04 NG/ML (ref 0–0.09)
RBC # BLD AUTO: 4.47 M/UL (ref 4.21–5.77)
SODIUM SERPL-SCNC: 138 MMOL/L (ref 136–145)
TEST INFORMATION: ABNORMAL
TROPONIN I SERPL HS-MCNC: <6 NG/L (ref 0–22)
WBC OTHER # BLD: 6.2 K/UL (ref 3.5–11.3)

## 2024-08-19 PROCEDURE — 6370000000 HC RX 637 (ALT 250 FOR IP): Performed by: INTERNAL MEDICINE

## 2024-08-19 PROCEDURE — 94761 N-INVAS EAR/PLS OXIMETRY MLT: CPT

## 2024-08-19 PROCEDURE — 99232 SBSQ HOSP IP/OBS MODERATE 35: CPT | Performed by: INTERNAL MEDICINE

## 2024-08-19 PROCEDURE — 74018 RADEX ABDOMEN 1 VIEW: CPT

## 2024-08-19 PROCEDURE — 99232 SBSQ HOSP IP/OBS MODERATE 35: CPT | Performed by: PSYCHIATRY & NEUROLOGY

## 2024-08-19 PROCEDURE — APPSS45 APP SPLIT SHARED TIME 31-45 MINUTES: Performed by: INTERNAL MEDICINE

## 2024-08-19 PROCEDURE — 2580000003 HC RX 258

## 2024-08-19 PROCEDURE — 83735 ASSAY OF MAGNESIUM: CPT

## 2024-08-19 PROCEDURE — 80048 BASIC METABOLIC PNL TOTAL CA: CPT

## 2024-08-19 PROCEDURE — 71045 X-RAY EXAM CHEST 1 VIEW: CPT

## 2024-08-19 PROCEDURE — 83605 ASSAY OF LACTIC ACID: CPT

## 2024-08-19 PROCEDURE — 85025 COMPLETE CBC W/AUTO DIFF WBC: CPT

## 2024-08-19 PROCEDURE — 36415 COLL VENOUS BLD VENIPUNCTURE: CPT

## 2024-08-19 PROCEDURE — 84132 ASSAY OF SERUM POTASSIUM: CPT

## 2024-08-19 PROCEDURE — 84145 PROCALCITONIN (PCT): CPT

## 2024-08-19 PROCEDURE — 2060000000 HC ICU INTERMEDIATE R&B

## 2024-08-19 PROCEDURE — 83874 ASSAY OF MYOGLOBIN: CPT

## 2024-08-19 PROCEDURE — 2500000003 HC RX 250 WO HCPCS

## 2024-08-19 PROCEDURE — 6370000000 HC RX 637 (ALT 250 FOR IP)

## 2024-08-19 PROCEDURE — 6360000002 HC RX W HCPCS: Performed by: INTERNAL MEDICINE

## 2024-08-19 PROCEDURE — 6370000000 HC RX 637 (ALT 250 FOR IP): Performed by: PSYCHIATRY & NEUROLOGY

## 2024-08-19 PROCEDURE — 2700000000 HC OXYGEN THERAPY PER DAY

## 2024-08-19 PROCEDURE — 82550 ASSAY OF CK (CPK): CPT

## 2024-08-19 PROCEDURE — 6360000002 HC RX W HCPCS

## 2024-08-19 PROCEDURE — C9254 INJECTION, LACOSAMIDE: HCPCS

## 2024-08-19 PROCEDURE — 86140 C-REACTIVE PROTEIN: CPT

## 2024-08-19 PROCEDURE — 84484 ASSAY OF TROPONIN QUANT: CPT

## 2024-08-19 PROCEDURE — 80307 DRUG TEST PRSMV CHEM ANLYZR: CPT

## 2024-08-19 PROCEDURE — 93005 ELECTROCARDIOGRAM TRACING: CPT

## 2024-08-19 RX ORDER — METOCLOPRAMIDE HYDROCHLORIDE 5 MG/ML
10 INJECTION INTRAMUSCULAR; INTRAVENOUS ONCE
Status: COMPLETED | OUTPATIENT
Start: 2024-08-19 | End: 2024-08-19

## 2024-08-19 RX ORDER — SENNOSIDES A AND B 8.6 MG/1
2 TABLET, FILM COATED ORAL NIGHTLY
Status: DISCONTINUED | OUTPATIENT
Start: 2024-08-19 | End: 2024-08-23

## 2024-08-19 RX ORDER — METOPROLOL TARTRATE 1 MG/ML
5 INJECTION, SOLUTION INTRAVENOUS
Status: COMPLETED | OUTPATIENT
Start: 2024-08-19 | End: 2024-08-19

## 2024-08-19 RX ORDER — LABETALOL HYDROCHLORIDE 5 MG/ML
10 INJECTION, SOLUTION INTRAVENOUS ONCE
Status: COMPLETED | OUTPATIENT
Start: 2024-08-19 | End: 2024-08-19

## 2024-08-19 RX ORDER — POLYETHYLENE GLYCOL 3350 17 G/17G
17 POWDER, FOR SOLUTION ORAL 2 TIMES DAILY
Status: DISCONTINUED | OUTPATIENT
Start: 2024-08-19 | End: 2024-08-23

## 2024-08-19 RX ADMIN — LABETALOL HYDROCHLORIDE 10 MG: 5 INJECTION, SOLUTION INTRAVENOUS at 04:19

## 2024-08-19 RX ADMIN — CHLORPROMAZINE HYDROCHLORIDE 50 MG: 100 TABLET, FILM COATED ORAL at 09:10

## 2024-08-19 RX ADMIN — ENOXAPARIN SODIUM 40 MG: 100 INJECTION SUBCUTANEOUS at 09:12

## 2024-08-19 RX ADMIN — LACOSAMIDE 200 MG: 10 INJECTION INTRAVENOUS at 21:41

## 2024-08-19 RX ADMIN — TROSPIUM CHLORIDE 20 MG: 20 TABLET, FILM COATED ORAL at 09:10

## 2024-08-19 RX ADMIN — TROSPIUM CHLORIDE 20 MG: 20 TABLET, FILM COATED ORAL at 17:07

## 2024-08-19 RX ADMIN — FLUOXETINE HYDROCHLORIDE 40 MG: 20 CAPSULE ORAL at 09:10

## 2024-08-19 RX ADMIN — OLANZAPINE 15 MG: 10 TABLET, FILM COATED ORAL at 09:10

## 2024-08-19 RX ADMIN — CHLORPROMAZINE HYDROCHLORIDE 200 MG: 100 TABLET, FILM COATED ORAL at 09:54

## 2024-08-19 RX ADMIN — Medication 240 ML: at 16:00

## 2024-08-19 RX ADMIN — LACTULOSE 20 G: 20 SOLUTION ORAL at 09:12

## 2024-08-19 RX ADMIN — LACOSAMIDE 200 MG: 10 INJECTION INTRAVENOUS at 09:11

## 2024-08-19 RX ADMIN — POLYETHYLENE GLYCOL 3350 17 G: 17 POWDER, FOR SOLUTION ORAL at 09:08

## 2024-08-19 RX ADMIN — SODIUM CHLORIDE, PRESERVATIVE FREE 10 ML: 5 INJECTION INTRAVENOUS at 09:09

## 2024-08-19 RX ADMIN — METOPROLOL TARTRATE 5 MG: 5 INJECTION INTRAVENOUS at 22:40

## 2024-08-19 RX ADMIN — CLONIDINE HYDROCHLORIDE 0.1 MG: 0.1 TABLET ORAL at 09:09

## 2024-08-19 RX ADMIN — SODIUM CHLORIDE, PRESERVATIVE FREE 10 ML: 5 INJECTION INTRAVENOUS at 21:41

## 2024-08-19 RX ADMIN — Medication 1000 MG: at 11:25

## 2024-08-19 RX ADMIN — METOCLOPRAMIDE 10 MG: 5 INJECTION, SOLUTION INTRAMUSCULAR; INTRAVENOUS at 12:07

## 2024-08-19 RX ADMIN — CHLORPROMAZINE HYDROCHLORIDE 100 MG: 100 TABLET, FILM COATED ORAL at 12:09

## 2024-08-19 RX ADMIN — OLANZAPINE 10 MG: 10 TABLET, FILM COATED ORAL at 21:41

## 2024-08-19 NOTE — PROGRESS NOTES
is nonverbal.       Past Medical History:   Past Medical History:   Diagnosis Date    Asthma     Autism disorder     Bipolar disorder (HCC)     Collar bone fracture     Constipation     Depressive disorder     GERD (gastroesophageal reflux disease)     Insomnia     Non-verbal learning disorder     Seizures (HCC)     as of 2- last seziure was 2007    Urinary frequency      Past Surgical History: No past surgical history on file.  Past Family History:   Family History   Problem Relation Age of Onset    Other Brother         Autism    Other Brother         Autism     Social History:  reports that he has never smoked. He has never used smokeless tobacco. He reports that he does not drink alcohol.    Medications:  Prior to Admission medications    Medication Sig Start Date End Date Taking? Authorizing Provider   FLUoxetine (PROZAC) 20 MG capsule Take 2 capsules by mouth daily   Yes Jailene Garcia MD   OLANZapine (ZYPREXA) 15 MG tablet Take 1 tablet by mouth daily AM   Yes Jailene Garcai MD   lacosamide (VIMPAT) 100 MG TABS tablet Take 1 tablet by mouth 2 times daily for 60 days. Max Daily Amount: 200 mg 8/15/24 10/14/24 Yes Abdelrahman Riddle MD   Multiple Vitamin (THERA/BETA-CAROTENE) TABS Take 1 tablet by mouth daily   Yes Jailene Garcia MD   tamsulosin (FLOMAX) 0.4 MG capsule Take 1 capsule by mouth daily   Yes Jailene Garcia MD   chlorproMAZINE (THORAZINE) 50 MG tablet Take 1 tablet by mouth daily Take with 200 mg tablet for total of 250 mg every morning   Yes Jailene Garcia MD   chlorproMAZINE (THORAZINE) 200 MG tablet Take 3 tablets by mouth daily 600mg at bedtime   Yes Jailene Garcia MD   cloNIDine (CATAPRES) 0.1 MG tablet Take 1 tablet by mouth 2 times daily Give in the morning and at noon   Yes Jailene Garcia MD   cloNIDine (CATAPRES) 0.2 MG tablet Take 1 tablet by mouth nightly   Yes Jailene Garcia MD   fluticasone (FLONASE) 50 MCG/ACT nasal spray 1 spray  0.00 - 0.44 k/uL    Basophils Absolute <0.03 0.00 - 0.20 k/uL    Immature Granulocytes Absolute <0.03 0.00 - 0.30 k/uL         Radiology:    MRI Brain without contrast: unremarkable    EEG: normal    CT head without contrast:    CT Angiogram of the head and neck:    Assessment & Plan     Impression: Yuriy Whelan is a 28 y.o. male with a history of autism, intellectual disability, nonverbal at baseline, and epilepsy who presented with a breakthrough seizure. Etiology of breakthrough seizure is likely 2/2 UTI vs drug ingestion (UDS positive for phencyclidine). MRI brain was unremarkable. Routine EEG normal.    Breakthrough Seizure 2/2 phencyclidine intoxication vs UTI  Continue vimpat 200 mg p.o. BID  MRI/ routine EEG normal    Abdominal distention  GI following  Repeat KUB this AM  Serial abd exams  Bowel regimen    UTI  Start rocephin 1 g IV daily x 5 days      PT/OT/SLP/Social work all consulted  Diet: regular adult diet  DVT Prophylaxis: lovenox 40 mg SC daily  Discharge Planning: group home    Patient to be discussed with attending physician, Bairon Gallego MD Austin J Lee, MD  PGY-2 Neurology Resident  8/19/2024  8:17 AM      Copy sent to Keshawn Alcantar MD    This note is created with the assistance of a speech-recognition program. While intending to generate a document that actually reflects the content of the visit, the document can still have some errors including those of syntax and sound a- like substitutions which may escape proofreading. In such instances, actual meaning can be extrapolated by contextual derivation.

## 2024-08-19 NOTE — PLAN OF CARE
Problem: Discharge Planning  Goal: Discharge to home or other facility with appropriate resources  8/19/2024 1809 by Lucina Nguyen RN  Outcome: Progressing  Flowsheets (Taken 8/19/2024 0800)  Discharge to home or other facility with appropriate resources: Identify barriers to discharge with patient and caregiver  8/19/2024 0611 by Doreen Nova RN  Outcome: Progressing  Flowsheets (Taken 8/18/2024 2000)  Discharge to home or other facility with appropriate resources: Identify barriers to discharge with patient and caregiver     Problem: Neurosensory - Adult  Goal: Achieves stable or improved neurological status  8/19/2024 1809 by Lucina Nguyen RN  Outcome: Progressing  Flowsheets (Taken 8/19/2024 0800)  Achieves stable or improved neurological status: Assess for and report changes in neurological status  8/19/2024 0611 by Doreen Nova RN  Outcome: Progressing  Flowsheets (Taken 8/18/2024 2000)  Achieves stable or improved neurological status: Assess for and report changes in neurological status  Goal: Absence of seizures  8/19/2024 1809 by Lucina Nguyen RN  Outcome: Progressing  Flowsheets (Taken 8/19/2024 0800)  Absence of seizures: Monitor for seizure activity.  If seizure occurs, document type and location of movements and any associated apnea  8/19/2024 0611 by Doreen Nova RN  Outcome: Progressing  Flowsheets (Taken 8/18/2024 2000)  Absence of seizures: Monitor for seizure activity.  If seizure occurs, document type and location of movements and any associated apnea  Goal: Remains free of injury related to seizures activity  8/19/2024 1809 by Lucina Nguyen RN  Outcome: Progressing  Flowsheets (Taken 8/19/2024 0800)  Remains free of injury related to seizure activity: Maintain airway, patient safety  and administer oxygen as ordered  8/19/2024 0611 by Doreen Nova RN  Outcome: Progressing  Flowsheets (Taken 8/18/2024 2000)  Remains free of injury related to seizure activity: Maintain airway, patient  request assistance  8/19/2024 0611 by Doreen Nova, RN  Outcome: Progressing  Flowsheets  Taken 8/19/2024 0049  Verbalizes/displays adequate comfort level or baseline comfort level: Encourage patient to monitor pain and request assistance  Taken 8/18/2024 2046  Verbalizes/displays adequate comfort level or baseline comfort level: Encourage patient to monitor pain and request assistance     Problem: ABCDS Injury Assessment  Goal: Absence of physical injury  8/19/2024 1809 by Lucina Nguyen RN  Outcome: Progressing  Flowsheets (Taken 8/19/2024 0800)  Absence of Physical Injury: Implement safety measures based on patient assessment  8/19/2024 0611 by Doreen Nova, RN  Outcome: Progressing

## 2024-08-19 NOTE — PLAN OF CARE
Problem: Discharge Planning  Goal: Discharge to home or other facility with appropriate resources  8/19/2024 0611 by Doreen Nova RN  Outcome: Progressing  Flowsheets (Taken 8/18/2024 2000)  Discharge to home or other facility with appropriate resources: Identify barriers to discharge with patient and caregiver  8/18/2024 1836 by Lucina Nguyen RN  Outcome: Progressing  Flowsheets (Taken 8/18/2024 0800)  Discharge to home or other facility with appropriate resources: Identify barriers to discharge with patient and caregiver     Problem: Neurosensory - Adult  Goal: Achieves stable or improved neurological status  8/19/2024 0611 by Doreen Nova RN  Outcome: Progressing  Flowsheets (Taken 8/18/2024 2000)  Achieves stable or improved neurological status: Assess for and report changes in neurological status  8/18/2024 1836 by Lucina Nguyen RN  Outcome: Progressing  Flowsheets (Taken 8/18/2024 0800)  Achieves stable or improved neurological status: Assess for and report changes in neurological status  Goal: Absence of seizures  8/19/2024 0611 by Doreen Nova RN  Outcome: Progressing  Flowsheets (Taken 8/18/2024 2000)  Absence of seizures: Monitor for seizure activity.  If seizure occurs, document type and location of movements and any associated apnea  8/18/2024 1836 by Lucina Nguyen RN  Outcome: Progressing  Flowsheets (Taken 8/18/2024 0800)  Absence of seizures: Monitor for seizure activity.  If seizure occurs, document type and location of movements and any associated apnea  Goal: Remains free of injury related to seizures activity  8/19/2024 0611 by Doreen Nova RN  Outcome: Progressing  Flowsheets (Taken 8/18/2024 2000)  Remains free of injury related to seizure activity: Maintain airway, patient safety  and administer oxygen as ordered  8/18/2024 1836 by Lucina Nguyen RN  Outcome: Progressing  Flowsheets (Taken 8/18/2024 0800)  Remains free of injury related to seizure activity: Maintain airway, patient

## 2024-08-19 NOTE — PROGRESS NOTES
[facility]astroenterology Progress Note    Yuriy Whelan is a 28 y.o. male patient.  Hospitalization Day:3      Chief consult reason: Colonic ileus-distal obstruction      Subjective: Patient seen and examined.  No acute events overnight.  No nausea or vomiting.  Patient continues to receive enemas every 8 hours.  Staff reports patient consumed large volume MiraLAX yesterday with no results    Patient abdomen firm and distended this AM on first rounds.  Patient seen again on second rounds and abdomen softer.  Staff reports patient is passing flatus      Objective:   VITALS:  BP (!) 149/96   Pulse 97   Temp 97.8 °F (36.6 °C) (Axillary)   Resp 13   Ht 1.702 m (5' 7.01\")   Wt 105 kg (231 lb 7.7 oz)   SpO2 96%   BMI 36.25 kg/m²   TEMPERATURE:  Current - Temp: 97.8 °F (36.6 °C); Max - Temp  Av.6 °F (36.4 °C)  Min: 96.8 °F (36 °C)  Max: 98 °F (36.7 °C)    Physical Assessment:  General appearance:  alert, cooperative and no distress  Mental Status: Awake, Non verbal  Lungs:  clear to auscultation bilaterally, normal effort  Heart:  regular rate and rhythm, no murmur  Abdomen:  soft, distended, few bowel sounds,   Extremities:  no edema, no redness, No clubbing  Skin:  warm, dry, no gross lesions or rashes    CURRENT MEDICATIONS:  Scheduled Meds:   polyethylene glycol  17 g Oral Daily    cefTRIAXone (ROCEPHIN) IV  1,000 mg IntraVENous Q24H    cloNIDine  0.1 mg Oral BID    FLUoxetine  40 mg Oral Daily    lactulose  20 g Oral BID    trospium  20 mg Oral BID AC    OLANZapine  15 mg Oral Daily    OLANZapine  10 mg Oral Nightly    cloNIDine  0.2 mg Oral Nightly    chlorproMAZINE  50 mg Oral Daily    chlorproMAZINE  100 mg Oral Lunch    chlorproMAZINE  200 mg Oral Daily    chlorproMAZINE  300 mg Oral Nightly    enoxaparin  40 mg SubCUTAneous Daily    lacosamide  200 mg IntraVENous BID    sodium chloride flush  5-40 mL IntraVENous 2 times per day     Continuous Infusions:   sodium chloride       PRN       XR ABDOMEN (KUB) (SINGLE AP VIEW)   Final Result   Markedly distended colon with gas and stool. The cecum measures up to 13 cm   in diameter. No abnormal air-fluid levels. Findings are concerning for   colonic ileus or distal obstruction.               ENDOSCOPY     Principal Problem:    Altered mental status  Active Problems:    Seizure (HCC)  Resolved Problems:    * No resolved hospital problems. *       GI Assessment:    28-year-old male with a history of bipolar disorder, autism, intellectual disability, nonverbal at baseline, seizure disorder and constipation who presented from [facility] for evaluation of altered mental status/change in behavior.  Patient had KUB completed this morning showing colonic distention with cecum measuring 13 cm prompting GI consult.     1. Colonic distention with cecum measuring up to 13 cm.  Imaging reviewed showing a large amount of retained stool in proximal colon  2. Constipation -patient has a history of constipation per chart  3.  Autism with intellectual disability and nonverbal at baseline      Recommendations:  Resume milk of molasses enemas every 8 hours  We will hold off on any type of colonic decompression as patient is passing flatus  Reglan 10 mg IV x 1 now  Discontinue lactulose-(causes worsening abdominal bloating and distention)  Senna 2 tablets daily at bedtime  Increase Miralax 17 gm to  twice daily  NPO with small sips of clears.  Can resume clear liquid diet after patient has bowel movement    Time spent reviewing chart, seeing patient, documentation and coordination of care for the above conditions, and discussing with attending: Around 45 minutes    This plan was formulated in collaboration with Dr. Mono Crisostomo  Please feel free to contact me with any questions or concerns.   Thank you for allowing me to participate in the care of your patient.      CHELSEA Vallejo - CNP on 8/19/2024 at 12:17 PM   [facility]    Please note that

## 2024-08-19 NOTE — PROCEDURES
PROCEDURE NOTE  Date: 8/18/2024   Name: Yuriy Whelan  YOB: 1995    Procedures            Date: 8/18/2024  Referring physician: Dr. Gail Douglas  Patient aged 28 y with seizures. EEG done to assess for epileptiform activity.    Introduction  This routine 25-minute EEG was recorded using the International 10-20 System on a [employer] workstation at 256 samples/s. Automated spike and seizure detection algorithms were applied.    Description  During the maximal alert state, a well-regulated, symmetric, and reactive 9 Hz posterior dominant rhythm was seen. No consistent focal slowing or interhemispheric asymmetry was noted. Stage I and stage II sleep were observed. There were no interictal epileptiform discharges or electrographic seizures.    Activations  Hyperventilation was not performed. Intermittent photic stimulation was performed and demonstrated no posterior driving response.    Impression  Normal awake and sleep EEG.       EKG lead did not show clear arrhythmia, if still in concern consider formal EKG or correlation with telemetry.       No epileptiform discharges were identified. Please note the absence of such activity on this record cannot conclusively rule out an epileptic disorder. If such is still clinically suspected, a repeat study with sleep deprivation and/or prolonged sampling may be helpful.    Snehal Wylie MD  Epilepsy Board Certified.  Neurology Board Certified.    Electronically Signed

## 2024-08-20 ENCOUNTER — APPOINTMENT (OUTPATIENT)
Dept: GENERAL RADIOLOGY | Age: 29
DRG: 101 | End: 2024-08-20
Attending: PSYCHIATRY & NEUROLOGY
Payer: MEDICARE

## 2024-08-20 LAB
ANION GAP SERPL CALCULATED.3IONS-SCNC: 13 MMOL/L (ref 9–16)
BASOPHILS # BLD: <0.03 K/UL (ref 0–0.2)
BASOPHILS NFR BLD: 0 % (ref 0–2)
BUN SERPL-MCNC: 15 MG/DL (ref 6–20)
CALCIUM SERPL-MCNC: 9.4 MG/DL (ref 8.6–10.4)
CHLORIDE SERPL-SCNC: 100 MMOL/L (ref 98–107)
CO2 SERPL-SCNC: 22 MMOL/L (ref 20–31)
CREAT SERPL-MCNC: 0.9 MG/DL (ref 0.7–1.2)
EKG ATRIAL RATE: 137 BPM
EKG P AXIS: 48 DEGREES
EKG P-R INTERVAL: 124 MS
EKG Q-T INTERVAL: 364 MS
EKG QRS DURATION: 76 MS
EKG QTC CALCULATION (BAZETT): 549 MS
EKG R AXIS: 46 DEGREES
EKG T AXIS: 55 DEGREES
EKG VENTRICULAR RATE: 137 BPM
EOSINOPHIL # BLD: <0.03 K/UL (ref 0–0.44)
EOSINOPHILS RELATIVE PERCENT: 0 % (ref 1–4)
ERYTHROCYTE [DISTWIDTH] IN BLOOD BY AUTOMATED COUNT: 14.2 % (ref 11.8–14.4)
GFR, ESTIMATED: >90 ML/MIN/1.73M2
GLUCOSE BLD-MCNC: 104 MG/DL (ref 75–110)
GLUCOSE BLD-MCNC: 97 MG/DL (ref 75–110)
GLUCOSE SERPL-MCNC: 108 MG/DL (ref 74–99)
HCT VFR BLD AUTO: 39.3 % (ref 40.7–50.3)
HGB BLD-MCNC: 12.8 G/DL (ref 13–17)
IMM GRANULOCYTES # BLD AUTO: <0.03 K/UL (ref 0–0.3)
IMM GRANULOCYTES NFR BLD: 0 %
LYMPHOCYTES NFR BLD: 0.86 K/UL (ref 1.1–3.7)
LYMPHOCYTES RELATIVE PERCENT: 11 % (ref 24–43)
MCH RBC QN AUTO: 28.8 PG (ref 25.2–33.5)
MCHC RBC AUTO-ENTMCNC: 32.6 G/DL (ref 28.4–34.8)
MCV RBC AUTO: 88.3 FL (ref 82.6–102.9)
MONOCYTES NFR BLD: 0.81 K/UL (ref 0.1–1.2)
MONOCYTES NFR BLD: 10 % (ref 3–12)
NEUTROPHILS NFR BLD: 79 % (ref 36–65)
NEUTS SEG NFR BLD: 6.3 K/UL (ref 1.5–8.1)
NRBC BLD-RTO: 0 PER 100 WBC
PLATELET # BLD AUTO: 179 K/UL (ref 138–453)
PMV BLD AUTO: 9.8 FL (ref 8.1–13.5)
POTASSIUM SERPL-SCNC: 4.4 MMOL/L (ref 3.7–5.3)
RBC # BLD AUTO: 4.45 M/UL (ref 4.21–5.77)
SODIUM SERPL-SCNC: 135 MMOL/L (ref 136–145)
TROPONIN I SERPL HS-MCNC: 10 NG/L (ref 0–22)
WBC OTHER # BLD: 8 K/UL (ref 3.5–11.3)

## 2024-08-20 PROCEDURE — 36415 COLL VENOUS BLD VENIPUNCTURE: CPT

## 2024-08-20 PROCEDURE — 99232 SBSQ HOSP IP/OBS MODERATE 35: CPT | Performed by: PSYCHIATRY & NEUROLOGY

## 2024-08-20 PROCEDURE — 85025 COMPLETE CBC W/AUTO DIFF WBC: CPT

## 2024-08-20 PROCEDURE — 6360000002 HC RX W HCPCS

## 2024-08-20 PROCEDURE — 84520 ASSAY OF UREA NITROGEN: CPT

## 2024-08-20 PROCEDURE — 2500000003 HC RX 250 WO HCPCS

## 2024-08-20 PROCEDURE — 83880 ASSAY OF NATRIURETIC PEPTIDE: CPT

## 2024-08-20 PROCEDURE — 6370000000 HC RX 637 (ALT 250 FOR IP): Performed by: PSYCHIATRY & NEUROLOGY

## 2024-08-20 PROCEDURE — 6360000002 HC RX W HCPCS: Performed by: PSYCHIATRY & NEUROLOGY

## 2024-08-20 PROCEDURE — C9254 INJECTION, LACOSAMIDE: HCPCS | Performed by: PSYCHIATRY & NEUROLOGY

## 2024-08-20 PROCEDURE — 95714 VEEG EA 12-26 HR UNMNTR: CPT

## 2024-08-20 PROCEDURE — 84484 ASSAY OF TROPONIN QUANT: CPT

## 2024-08-20 PROCEDURE — 95700 EEG CONT REC W/VID EEG TECH: CPT

## 2024-08-20 PROCEDURE — 99232 SBSQ HOSP IP/OBS MODERATE 35: CPT | Performed by: INTERNAL MEDICINE

## 2024-08-20 PROCEDURE — 82947 ASSAY GLUCOSE BLOOD QUANT: CPT

## 2024-08-20 PROCEDURE — 2580000003 HC RX 258

## 2024-08-20 PROCEDURE — 6370000000 HC RX 637 (ALT 250 FOR IP): Performed by: INTERNAL MEDICINE

## 2024-08-20 PROCEDURE — 82565 ASSAY OF CREATININE: CPT

## 2024-08-20 PROCEDURE — 2060000000 HC ICU INTERMEDIATE R&B

## 2024-08-20 PROCEDURE — 80048 BASIC METABOLIC PNL TOTAL CA: CPT

## 2024-08-20 PROCEDURE — 84295 ASSAY OF SERUM SODIUM: CPT

## 2024-08-20 PROCEDURE — 95718 EEG PHYS/QHP 2-12 HR W/VEEG: CPT | Performed by: PSYCHIATRY & NEUROLOGY

## 2024-08-20 PROCEDURE — 6370000000 HC RX 637 (ALT 250 FOR IP)

## 2024-08-20 PROCEDURE — C9254 INJECTION, LACOSAMIDE: HCPCS

## 2024-08-20 PROCEDURE — 74018 RADEX ABDOMEN 1 VIEW: CPT

## 2024-08-20 RX ORDER — 0.9 % SODIUM CHLORIDE 0.9 %
500 INTRAVENOUS SOLUTION INTRAVENOUS ONCE
Status: COMPLETED | OUTPATIENT
Start: 2024-08-20 | End: 2024-08-20

## 2024-08-20 RX ORDER — 0.9 % SODIUM CHLORIDE 0.9 %
500 INTRAVENOUS SOLUTION INTRAVENOUS ONCE
Status: COMPLETED | OUTPATIENT
Start: 2024-08-20 | End: 2024-08-21

## 2024-08-20 RX ORDER — DIVALPROEX SODIUM 125 MG/1
500 CAPSULE, COATED PELLETS ORAL EVERY 12 HOURS SCHEDULED
Status: DISCONTINUED | OUTPATIENT
Start: 2024-08-20 | End: 2024-08-20 | Stop reason: ALTCHOICE

## 2024-08-20 RX ORDER — MIDAZOLAM HYDROCHLORIDE 1 MG/ML
INJECTION INTRAMUSCULAR; INTRAVENOUS
Status: COMPLETED
Start: 2024-08-20 | End: 2024-08-20

## 2024-08-20 RX ORDER — LACOSAMIDE 10 MG/ML
200 INJECTION, SOLUTION INTRAVENOUS ONCE
Status: COMPLETED | OUTPATIENT
Start: 2024-08-20 | End: 2024-08-20

## 2024-08-20 RX ORDER — MIDAZOLAM HYDROCHLORIDE 2 MG/2ML
4 INJECTION, SOLUTION INTRAMUSCULAR; INTRAVENOUS ONCE
Status: COMPLETED | OUTPATIENT
Start: 2024-08-20 | End: 2024-08-20

## 2024-08-20 RX ORDER — DEXTROSE MONOHYDRATE AND SODIUM CHLORIDE 5; .45 G/100ML; G/100ML
INJECTION, SOLUTION INTRAVENOUS CONTINUOUS
Status: DISCONTINUED | OUTPATIENT
Start: 2024-08-20 | End: 2024-08-27

## 2024-08-20 RX ADMIN — LACOSAMIDE 200 MG: 10 INJECTION INTRAVENOUS at 23:07

## 2024-08-20 RX ADMIN — DEXTROSE AND SODIUM CHLORIDE: 5; 450 INJECTION, SOLUTION INTRAVENOUS at 15:18

## 2024-08-20 RX ADMIN — CLONIDINE HYDROCHLORIDE 0.1 MG: 0.1 TABLET ORAL at 08:23

## 2024-08-20 RX ADMIN — LORAZEPAM 2 MG: 2 INJECTION INTRAMUSCULAR; INTRAVENOUS at 11:25

## 2024-08-20 RX ADMIN — SENNOSIDES 17.2 MG: 8.6 TABLET, FILM COATED ORAL at 00:30

## 2024-08-20 RX ADMIN — SODIUM CHLORIDE, PRESERVATIVE FREE 10 ML: 5 INJECTION INTRAVENOUS at 20:32

## 2024-08-20 RX ADMIN — ENOXAPARIN SODIUM 40 MG: 100 INJECTION SUBCUTANEOUS at 08:21

## 2024-08-20 RX ADMIN — POLYETHYLENE GLYCOL 3350 17 G: 17 POWDER, FOR SOLUTION ORAL at 08:21

## 2024-08-20 RX ADMIN — POLYETHYLENE GLYCOL 3350 17 G: 17 POWDER, FOR SOLUTION ORAL at 00:30

## 2024-08-20 RX ADMIN — Medication 1000 MG: at 10:28

## 2024-08-20 RX ADMIN — OLANZAPINE 15 MG: 10 TABLET, FILM COATED ORAL at 08:21

## 2024-08-20 RX ADMIN — SODIUM CHLORIDE 500 ML: 9 INJECTION, SOLUTION INTRAVENOUS at 23:24

## 2024-08-20 RX ADMIN — Medication 240 ML: at 01:40

## 2024-08-20 RX ADMIN — CHLORPROMAZINE HYDROCHLORIDE 50 MG: 100 TABLET, FILM COATED ORAL at 08:23

## 2024-08-20 RX ADMIN — LORAZEPAM 2 MG: 2 INJECTION INTRAMUSCULAR; INTRAVENOUS at 11:30

## 2024-08-20 RX ADMIN — FLUOXETINE HYDROCHLORIDE 40 MG: 20 CAPSULE ORAL at 08:22

## 2024-08-20 RX ADMIN — TROSPIUM CHLORIDE 20 MG: 20 TABLET, FILM COATED ORAL at 08:21

## 2024-08-20 RX ADMIN — SODIUM CHLORIDE, PRESERVATIVE FREE 10 ML: 5 INJECTION INTRAVENOUS at 08:24

## 2024-08-20 RX ADMIN — VALPROATE SODIUM 2500 MG: 100 INJECTION, SOLUTION INTRAVENOUS at 11:52

## 2024-08-20 RX ADMIN — LACOSAMIDE 200 MG: 10 INJECTION INTRAVENOUS at 11:32

## 2024-08-20 RX ADMIN — MIDAZOLAM HYDROCHLORIDE 4 MG: 1 INJECTION, SOLUTION INTRAMUSCULAR; INTRAVENOUS at 11:55

## 2024-08-20 RX ADMIN — DIVALPROEX SODIUM 500 MG: 125 CAPSULE, COATED PELLETS ORAL at 10:28

## 2024-08-20 RX ADMIN — LORAZEPAM 2 MG: 2 INJECTION INTRAMUSCULAR; INTRAVENOUS at 11:20

## 2024-08-20 RX ADMIN — LACOSAMIDE 200 MG: 10 INJECTION INTRAVENOUS at 08:23

## 2024-08-20 RX ADMIN — CHLORPROMAZINE HYDROCHLORIDE 300 MG: 100 TABLET, FILM COATED ORAL at 00:30

## 2024-08-20 RX ADMIN — CLONIDINE HYDROCHLORIDE 0.2 MG: 0.1 TABLET ORAL at 00:30

## 2024-08-20 RX ADMIN — Medication 240 ML: at 10:46

## 2024-08-20 RX ADMIN — SODIUM CHLORIDE 500 ML: 9 INJECTION, SOLUTION INTRAVENOUS at 06:24

## 2024-08-20 RX ADMIN — MIDAZOLAM HYDROCHLORIDE 4 MG: 2 INJECTION, SOLUTION INTRAMUSCULAR; INTRAVENOUS at 11:55

## 2024-08-20 NOTE — PROGRESS NOTES
1117 Writer walked into the room to prepare pt's lunch time medication, staff in the room pt having seizure like activity. Pt tachycardic sustaining 130-140s with RR in upper 30's. Pt experiencing stiffness in all of his extremities, eyes rolled back, oxygen dropping to 70's. Pt is pale and cyanotic. Neuro notified.   RN Staff rolled pt to the side, simple mask placed. O2 saturation went back up to 90's.     1120 First dose of ativan was given  1123 Dr. Winters at the bedside  1125 Second dose of Ativan was given.  1127 Neurology team at the bedside along with Dr. Mcduffie & Pt's legal guardian.  1130 Third dose of Ativan was given.   EEG stat was ordered. Charge nurse at the bedside.   1132 Vimpat 200 mg was ordered by Dr. Mcduffie & given.   Pt continuously tachycardic, tachypnea, b/p is 170s, not responding the commands.  1152 Depacon IVPB given,   1155 Versed 4 mg given per Dr. Mcduffie.   Will continue to monitor for now per neurology.  1410 EEG techs at the bedside, pt on LTME.

## 2024-08-20 NOTE — PLAN OF CARE
Got a secure message via [employer] that patient is tachycardic with HR in 140s. Came to the bedside EKG was obtained sinus tachycardia, otherwise unremarkable. Lab for K, Mg, troponin x2 were ordered. At 10:10 pm patient had a seizure that started with head and gaze deviation to the right with subsequent progression to bilateral tonic clonic. Patient was placed to the left decubitus position and seizure aborted by itself in ~2 mins. In early postictal phase patient was noted to saturating in 70s, requiring suctioning and increase in oxygen via the nasal cannula. Chest x-ray and procal were ordered to rule out aspiration. Later on patient became agitated and combative, bedside sitter present.    Electronically signed by Cora Harp MD on 8/19/2024 at 10:26 PM

## 2024-08-20 NOTE — PROGRESS NOTES
Physician Progress Note      PATIENT:               YAMILETH LUCIO  CSN #:                  [id number]  :                       1995  ADMIT DATE:       2024 7:30 PM  DISCH DATE:  RESPONDING  PROVIDER #:        DAY HERNANDEZ          QUERY TEXT:    Pt admitted with breakthrough seizure and noted as not to have been acting per   normal baseline. Pt noted to have UTI, phencyclidine positive UDS. If   possible, please document in progress notes and discharge summary after study   the etiology of the seizure:    The medical record reflects the following:  Risk Factors: seizure disorder,  autism with intellectual disabilities and non   verbal.  Clinical Indicators: pt treated for GTC seizures in ED and sent back to group   home in [place].  Returned as not acting properly per staff, as noted to   usually active, able to feed self and get out of bed for bathroom usage.    After ts in ED he was not getting out of bed, feeding self and not active.    UA-  - small bilirubin, trace leukest, few bacteria.  UDS-    benzodiazepine, phencyclidine. MRI brain - unremarkable.   KUB completed this   morning showing colonic distention with cecum measuring 13 cm prompting GI   consult. EEG-  - No epileptiform discharges were identified.  Treatment: IV- Ativan, Rocephin.   PO- vimpat, 150 mg- bid and increased to   200 mg per ED.    Thank you, please contact me for any questions.  Arnold Bull RN, CDS  Email [email or web address]  office hours - [phone]  Options provided:  -- Seizure due to poisoning by phenycyclidine and UTI.  -- Seizure due to UTI  -- Seizure due to poisoning by phenycyclidine  -- Other - I will add my own diagnosis  -- Disagree - Not applicable / Not valid  -- Disagree - Clinically unable to determine / Unknown  -- Refer to Clinical Documentation Reviewer    PROVIDER RESPONSE TEXT:    Provider is clinically unable to determine a response to this query.  unclear if from UTI or phencyclidine

## 2024-08-20 NOTE — SIGNIFICANT EVENT
Writer and the rest of the neurology team were called to bedside for seizure like activity around 11:17 am.  Patient was noted to have left gaze preference, and was not responding to painful stimuli. STAT EEG was ordered.  Patient was given a total of 6 mg of Ativan IV.  Given Vimpat 200 mg IV due to being the only ASM on hand.  Patient was then given Depakote 2.5 g IV, and then Versed 4 mg IV.  During the episode, patient's heart rate was elevated to 150s bpm, and was hypertensive up to 194/97.  Discussed with family next steps which included Versed drip and intubation which they did agree to.  However, the event was broken after about 45 to 55 minutes with the patient withdrawing to painful stimuli in all extremities, and spontaneous movement of the limbs.  Still awaiting continuous EEG monitoring due to shortage of machines.    Salinas Canales MD  PGY-2 Neurology Resident  8/20/2024  12:48 PM

## 2024-08-20 NOTE — PLAN OF CARE
Problem: Discharge Planning  Goal: Discharge to home or other facility with appropriate resources  8/20/2024 1803 by Lucina Nguyen RN  Outcome: Progressing  Flowsheets (Taken 8/20/2024 1117)  Discharge to home or other facility with appropriate resources: Identify barriers to discharge with patient and caregiver  8/20/2024 0417 by Lacy Monteiro RN  Outcome: Progressing     Problem: Neurosensory - Adult  Goal: Achieves stable or improved neurological status  8/20/2024 1803 by Lucina Nguyen RN  Outcome: Progressing  Flowsheets (Taken 8/20/2024 1117)  Achieves stable or improved neurological status: Assess for and report changes in neurological status  8/20/2024 0417 by Lacy Monteiro RN  Outcome: Progressing  Goal: Absence of seizures  8/20/2024 1803 by Lucina Nguyen RN  Outcome: Progressing  Flowsheets (Taken 8/20/2024 1117)  Absence of seizures: Monitor for seizure activity.  If seizure occurs, document type and location of movements and any associated apnea  8/20/2024 0417 by Lacy Monteiro RN  Outcome: Progressing  Goal: Remains free of injury related to seizures activity  8/20/2024 1803 by Lucina Nguyen RN  Outcome: Progressing  Flowsheets (Taken 8/20/2024 1117)  Remains free of injury related to seizure activity: Maintain airway, patient safety  and administer oxygen as ordered  8/20/2024 0417 by Lacy Monteiro RN  Outcome: Progressing  Goal: Achieves maximal functionality and self care  8/20/2024 1803 by Lucina Nguyen RN  Outcome: Progressing  Flowsheets (Taken 8/20/2024 1117)  Achieves maximal functionality and self care: Monitor swallowing and airway patency with patient fatigue and changes in neurological status  8/20/2024 0417 by Lacy Monteiro RN  Outcome: Progressing     Problem: Genitourinary - Adult  Goal: Absence of urinary retention  8/20/2024 1803 by Lucina Nguyen RN  Outcome: Progressing  Flowsheets (Taken 8/20/2024 1600)  Absence of urinary retention: Assess patient’s ability to void and empty  bladder  8/20/2024 0417 by Lacy Monteiro RN  Outcome: Progressing  Goal: Urinary catheter remains patent  8/20/2024 1803 by Lucina Nguyen RN  Outcome: Progressing  Flowsheets (Taken 8/20/2024 1600)  Urinary catheter remains patent: Assess patency of urinary catheter  8/20/2024 0417 by Lacy Monteiro RN  Outcome: Progressing     Problem: Pain  Goal: Verbalizes/displays adequate comfort level or baseline comfort level  8/20/2024 1803 by Lucina Nguyen RN  Outcome: Progressing  Flowsheets  Taken 8/20/2024 1515  Verbalizes/displays adequate comfort level or baseline comfort level: Encourage patient to monitor pain and request assistance  Taken 8/20/2024 1400  Verbalizes/displays adequate comfort level or baseline comfort level: Encourage patient to monitor pain and request assistance  Taken 8/20/2024 0743  Verbalizes/displays adequate comfort level or baseline comfort level: Encourage patient to monitor pain and request assistance  8/20/2024 0417 by Lacy Monteiro RN  Outcome: Progressing     Problem: ABCDS Injury Assessment  Goal: Absence of physical injury  8/20/2024 1803 by Lucina Nguyen RN  Outcome: Progressing  8/20/2024 0417 by Lacy Monteiro RN  Outcome: Progressing     Problem: Safety - Medical Restraint  Goal: Remains free of injury from restraints (Restraint for Interference with Medical Device)  Description: INTERVENTIONS:  1. Determine that other, less restrictive measures have been tried or would not be effective before applying the restraint  2. Evaluate the patient's condition at the time of restraint application  3. Inform patient/family regarding the reason for restraint  4. Q2H: Monitor safety, psychosocial status, comfort, nutrition and hydration  8/20/2024 0417 by Lacy Monteiro RN  Outcome: Completed  Flowsheets  Taken 8/20/2024 0200  Remains free of injury from restraints (restraint for interference with medical device):   Determine that other, less restrictive measures have been tried or would not  4 = No assist / stand by assistance

## 2024-08-20 NOTE — PROCEDURES
LONG-TERM EEG-VIDEO MONITORING   CLINICAL NEUROPHYSIOLOGY LABORATORY  DEPARTMENT OF NEUROLOGY  [facility]    Patient: Yuriy Whelan  Age: 28 y.o.  MRN: [id number]    Referring Physician: Abdelrahman Riddle MD  History: The patient is a 28 y.o. male with hx of epilepsy who had a prolonged seizure. This long-term video-EEG monitoring study was performed to evaluate for seizures. The patient is on neuroactive medications.   Yuriy Whelan   Current Facility-Administered Medications   Medication Dose Route Frequency Provider Last Rate Last Admin    divalproex (DEPAKOTE SPRINKLE) DR capsule 500 mg  500 mg Oral 2 times per day Salinas Canales MD   500 mg at 08/20/24 1028    levETIRAcetam (KEPPRA) 4,000 mg in sodium chloride 0.9 % 100 mL IVPB  4,000 mg IntraVENous Once Abiodun Winters MD        dextrose 5 % and 0.45 % sodium chloride infusion   IntraVENous Continuous Abiodun Winters  mL/hr at 08/20/24 1518 New Bag at 08/20/24 1518    senna (SENOKOT) tablet 17.2 mg  2 tablet Oral Nightly Shereen Paige APRN - CNP   17.2 mg at 08/20/24 0030    polyethylene glycol (GLYCOLAX) packet 17 g  17 g Oral BID Shereen Paige APRN - CNP   17 g at 08/20/24 0821    cefTRIAXone (ROCEPHIN) 1000 mg in sterile water 10 mL IV syringe  1,000 mg IntraVENous Q24H Salinas Canales MD   1,000 mg at 08/20/24 1028    cloNIDine (CATAPRES) tablet 0.1 mg  0.1 mg Oral BID Salinas Canales MD   0.1 mg at 08/20/24 0823    FLUoxetine (PROZAC) capsule 40 mg  40 mg Oral Daily Salinas Canales MD   40 mg at 08/20/24 0822    trospium (SANCTURA) tablet 20 mg  20 mg Oral BID AC Salinas Canales MD   20 mg at 08/20/24 0821    OLANZapine (ZYPREXA) tablet 15 mg  15 mg Oral Daily Salinas Canales MD   15 mg at 08/20/24 0821    OLANZapine (ZYPREXA) tablet 10 mg  10 mg Oral Nightly Salinas Canales MD   10 mg at 08/19/24 2141    cloNIDine (CATAPRES) tablet 0.2 mg  0.2 mg Oral Nightly Salinas Canales MD   0.2 mg at 08/20/24 0030    chlorproMAZINE (THORAZINE)

## 2024-08-20 NOTE — PROGRESS NOTES
Pt is lethargic but back to baseline, group home staff at the bedside. Pt resting, HR in 110s - primary aware, b/p 145/89. Currently on 2L NC sating 96%. No new orders at this time.

## 2024-08-20 NOTE — PROGRESS NOTES
Jailene Garcia MD   lacosamide (VIMPAT) 50 MG TABS tablet Take 3 tablets by mouth 2 times daily.  Patient not taking: Reported on 8/17/2024    Jailene Garcia MD   ibuprofen (IBU) 800 MG tablet Take 1 tablet by mouth every 8 hours as needed for Pain 8/18/20   Emiliana Aviles,    Melatonin 5 MG TABS tablet Take 1 tablet by mouth nightly  Patient not taking: Reported on 8/17/2024    Jailene Garcia MD   MINERAL OIL PO Take 15 mLs by mouth nightly  Patient not taking: Reported on 8/17/2024    Jailene Garcia MD   omeprazole (PRILOSEC) 20 MG capsule Take 1 capsule by mouth Daily  Patient not taking: Reported on 8/17/2024    Jailene Garcia MD   propranolol (INDERAL) 20 MG tablet Take 1 tablet by mouth 2 times daily  Patient not taking: Reported on 8/17/2024    Jailene Garcia MD   temazepam (RESTORIL) 30 MG capsule Take 1 capsule by mouth nightly.  Patient not taking: Reported on 8/17/2024    Jailene Garcia MD   Multiple Vitamins-Minerals (THERAPEUTIC MULTIVITAMIN-MINERALS) tablet Take 1 tablet by mouth daily  Patient not taking: Reported on 8/17/2024    Jailene Garcia MD   diazepam (DIASTAT) 10 MG GEL Place 10 mg rectally as needed. As needed for seizures    Jailene Garcia MD   haloperidol (HALDOL) 1 MG tablet Take 2 tablets by mouth every 4 hours as needed for Agitation Max 8 mg in 24 hours  Patient not taking: Reported on 8/17/2024    Jailene Garcia MD   LORazepam (ATIVAN) 1 MG tablet Take 1 tablet by mouth every 4 hours as needed for Anxiety (for mental health dx of autism as evidence by 10 consecutive min of PA AND OR SIB MAX 10 mg/24  hour). Max 4mg in 24 hours    Jailene Garcia MD   albuterol sulfate  (90 BASE) MCG/ACT inhaler Inhale 2 puffs into the lungs every 4 hours as needed for Wheezing  Patient not taking: Reported on 8/17/2024    Jailene Garcia MD     Current Facility-Administered Medications   Medication Dose Route  08/19/24 11:11 PM   Result Value Ref Range    Troponin, High Sensitivity <6 0 - 22 ng/L   C-Reactive Protein    Collection Time: 08/19/24 11:11 PM   Result Value Ref Range    CRP 39.1 (H) 0.0 - 5.0 mg/L   Procalcitonin    Collection Time: 08/19/24 11:11 PM   Result Value Ref Range    Procalcitonin 0.04 0.00 - 0.09 ng/mL   CK    Collection Time: 08/19/24 11:11 PM   Result Value Ref Range    Total CK 80 39 - 308 U/L   Myoglobin, Blood    Collection Time: 08/19/24 11:11 PM   Result Value Ref Range    Myoglobin 414 (H) 28 - 72 ng/mL   POC Glucose Fingerstick    Collection Time: 08/20/24  1:08 AM   Result Value Ref Range    POC Glucose 104 75 - 110 mg/dL   Basic Metabolic Panel w/ Reflex to MG    Collection Time: 08/20/24  1:21 AM   Result Value Ref Range    Sodium 135 (L) 136 - 145 mmol/L    Potassium 4.4 3.7 - 5.3 mmol/L    Chloride 100 98 - 107 mmol/L    CO2 22 20 - 31 mmol/L    Anion Gap 13 9 - 16 mmol/L    Glucose 108 (H) 74 - 99 mg/dL    BUN 15 6 - 20 mg/dL    Creatinine 0.9 0.70 - 1.20 mg/dL    Est, Glom Filt Rate >90 >60 mL/min/1.73m2    Calcium 9.4 8.6 - 10.4 mg/dL   CBC with Auto Differential    Collection Time: 08/20/24  1:21 AM   Result Value Ref Range    WBC 8.0 3.5 - 11.3 k/uL    RBC 4.45 4.21 - 5.77 m/uL    Hemoglobin 12.8 (L) 13.0 - 17.0 g/dL    Hematocrit 39.3 (L) 40.7 - 50.3 %    MCV 88.3 82.6 - 102.9 fL    MCH 28.8 25.2 - 33.5 pg    MCHC 32.6 28.4 - 34.8 g/dL    RDW 14.2 11.8 - 14.4 %    Platelets 179 138 - 453 k/uL    MPV 9.8 8.1 - 13.5 fL    NRBC Automated 0.0 0.0 per 100 WBC    Neutrophils % 79 (H) 36 - 65 %    Lymphocytes % 11 (L) 24 - 43 %    Monocytes % 10 3 - 12 %    Eosinophils % 0 (L) 1 - 4 %    Basophils % 0 0 - 2 %    Immature Granulocytes % 0 0 %    Neutrophils Absolute 6.30 1.50 - 8.10 k/uL    Lymphocytes Absolute 0.86 (L) 1.10 - 3.70 k/uL    Monocytes Absolute 0.81 0.10 - 1.20 k/uL    Eosinophils Absolute <0.03 0.00 - 0.44 k/uL    Basophils Absolute <0.03 0.00 - 0.20 k/uL    Immature

## 2024-08-20 NOTE — PLAN OF CARE
Problem: Discharge Planning  Goal: Discharge to home or other facility with appropriate resources  8/20/2024 0417 by Lacy Monteiro RN  Outcome: Progressing  8/19/2024 1809 by Lucina Nguyen RN  Outcome: Progressing  Flowsheets (Taken 8/19/2024 0800)  Discharge to home or other facility with appropriate resources: Identify barriers to discharge with patient and caregiver     Problem: Neurosensory - Adult  Goal: Achieves stable or improved neurological status  8/20/2024 0417 by Lacy Monteiro RN  Outcome: Progressing  8/19/2024 1809 by Lucina Nguyen RN  Outcome: Progressing  Flowsheets (Taken 8/19/2024 0800)  Achieves stable or improved neurological status: Assess for and report changes in neurological status  Goal: Absence of seizures  8/20/2024 0417 by Lacy Monteiro RN  Outcome: Progressing  8/19/2024 1809 by Lucina Nguyen RN  Outcome: Progressing  Flowsheets (Taken 8/19/2024 0800)  Absence of seizures: Monitor for seizure activity.  If seizure occurs, document type and location of movements and any associated apnea  Goal: Remains free of injury related to seizures activity  8/20/2024 0417 by Lacy Monteiro RN  Outcome: Progressing  8/19/2024 1809 by Lucina Nguyen RN  Outcome: Progressing  Flowsheets (Taken 8/19/2024 0800)  Remains free of injury related to seizure activity: Maintain airway, patient safety  and administer oxygen as ordered  Goal: Achieves maximal functionality and self care  8/20/2024 0417 by Lacy Monteiro RN  Outcome: Progressing  8/19/2024 1809 by Lucina Nguyen RN  Outcome: Progressing  Flowsheets (Taken 8/19/2024 0800)  Achieves maximal functionality and self care: Monitor swallowing and airway patency with patient fatigue and changes in neurological status     Problem: Genitourinary - Adult  Goal: Absence of urinary retention  8/20/2024 0417 by Lacy Monteiro RN  Outcome: Progressing  8/19/2024 1809 by Lucina Nguyen RN  Outcome: Progressing  Flowsheets (Taken 8/19/2024 0800)  Absence of  device):   Determine that other, less restrictive measures have been tried or would not be effective before applying the restraint   Evaluate the patient's condition at the time of restraint application   Inform patient/family regarding the reason for restraint   Every 2 hours: Monitor safety, psychosocial status, comfort, nutrition and hydration  Taken 8/20/2024 0100  Remains free of injury from restraints (restraint for interference with medical device):   Determine that other, less restrictive measures have been tried or would not be effective before applying the restraint   Evaluate the patient's condition at the time of restraint application   Inform patient/family regarding the reason for restraint   Every 2 hours: Monitor safety, psychosocial status, comfort, nutrition and hydration  Taken 8/19/2024 2300  Remains free of injury from restraints (restraint for interference with medical device):   Determine that other, less restrictive measures have been tried or would not be effective before applying the restraint   Evaluate the patient's condition at the time of restraint application   Inform patient/family regarding the reason for restraint   Every 2 hours: Monitor safety, psychosocial status, comfort, nutrition and hydration  Taken 8/19/2024 2234  Remains free of injury from restraints (restraint for interference with medical device):   Determine that other, less restrictive measures have been tried or would not be effective before applying the restraint   Evaluate the patient's condition at the time of restraint application   Inform patient/family regarding the reason for restraint   Every 2 hours: Monitor safety, psychosocial status, comfort, nutrition and hydration  Taken 8/19/2024 2227  Remains free of injury from restraints (restraint for interference with medical device):   Determine that other, less restrictive measures have been tried or would not be effective before applying the restraint   Evaluate

## 2024-08-20 NOTE — PROGRESS NOTES
[facility]   Gastroenterology Progress Note    Yuriy Whelan is a 28 y.o. male patient.  Hospitalization Day:4      Chief consult reason:   Colonic ileus-distal obstruction    Subjective:  Patient seen and examined, no acute events overnight  Patient received enema this morning and is starting to have stool  Abdomen is still slightly distended but soft  Mother at bedside and updated      VITALS:  BP (!) 156/105   Pulse 97   Temp 98.4 °F (36.9 °C) (Axillary)   Resp 15   Ht 1.702 m (5' 7.01\")   Wt 105 kg (231 lb 7.7 oz)   SpO2 99%   BMI 36.25 kg/m²   TEMPERATURE:  Current - Temp: 98.4 °F (36.9 °C); Max - Temp  Av.5 °F (36.9 °C)  Min: 98 °F (36.7 °C)  Max: 99 °F (37.2 °C)    Physical Assessment:  General appearance: Awake  Mental Status: BOOM  Lungs:  clear to auscultation bilaterally, normal effort  Heart:  regular rate and rhythm, no murmur  Abdomen:  soft, nontender, slightly distended, normal bowel sounds, no masses, hepatomegaly, splenomegaly  Extremities:  no edema, redness, tenderness in the calves  Skin:  no gross lesions, rashes, induration    Data Review:    Labs and Imaging:     CBC:  Recent Labs     24  0633 24  0534 24  0121   WBC 6.0 6.2 8.0   HGB 12.4* 12.8* 12.8*   MCV 86.8 87.7 88.3   RDW 14.0 14.1 14.2    164 179       ANEMIA STUDIES:  No results for input(s): \"TIBC\", \"FERRITIN\", \"PHNVIBZO47\", \"FOLATE\", \"OCCULTBLD\" in the last 72 hours.    Invalid input(s): \"LABIRON\"    BMP:  Recent Labs     24  0633 24  0534 24  2311 24  0121    138  --  135*   K 4.0 4.4 4.5 4.4    105  --  100   CO2 23 24  --  22   BUN 12 12  --  15   CREATININE 0.8 0.8  --  0.9   GLUCOSE 108* 112*  --  108*   CALCIUM 9.0 9.0  --  9.4   MG  --   --  2.2  --        LFTS:  No results for input(s): \"ALKPHOS\", \"ALT\", \"AST\", \"BILITOT\", \"BILIDIR\", \"LABALBU\" in the last 72 hours.    Amylase/Lipase and Ammonia:  No results for input(s): \"AMYLASE\",

## 2024-08-20 NOTE — PROGRESS NOTES
08/19/2024 2159-Writer messaged Antonio Costa MD requesting that he come to the bedside to evaluate pt.  HR sustaining in 140s.  EKG being obtained.    2205-Austen Horowitz and Katiuska at bedside to evaluate pt.  Pt began to seize.  Episode lasted < 2 mins and pt was able to recover without ativan.  Pt was turned on his right side and oral secretions were suctioned from the mouth to promote airway clearance.  Pt was placed on 5 L simple mask d/t oxygen saturation dipping into the 70s and 80s.  Post-ictally, pt became combative and continued to remove simple mask, leading to immediate desaturation back to low 80s.  Orders received for chest xray and labs.  [id number]-Tedpdz received for bilateral wrist restrains and left mitt.  Restraints applied for pt safety.  Sitter remained at bedside with pt.  2240-Metoprolol given to reduce HR from 130s.  2300-  0030-Pt back to baseline on 2 L NC.  0200-Restraints removed. Order later discontinued by primary service.    0255-Writer informed Antonio Costa MD that milk and molasses enema was instilled at 0200 without resistance during instillation.  Pt shifted side to side in bed and sat up vertically on bed pan to promote expulsion.  Pt expelled approximately 40 mL of enema but not the remainder.  No new orders at this time.      0442-Writer followed up with Antonio Costa MD to ensure that no additional interventions were needed regarding the lack of output from the enema.  Dr. Costa requested that writer reach out to GI to determine next steps.    0447-Writer informed Dr. Mono Crisostomo (with GI) of pt's expulsion of approximately 50 mL of milk and molasses enema but not the remainder of the 240 instilled.    0448-Per Dr. Mono Lainez, will see him in the morning.  Observe if no significant symptoms.  No new orders at this time.  Pt not displaying any signs of pain, bowel sounds present in all quadrants, pt passing gas, and VSS.

## 2024-08-21 ENCOUNTER — APPOINTMENT (OUTPATIENT)
Dept: GENERAL RADIOLOGY | Age: 29
DRG: 101 | End: 2024-08-21
Attending: PSYCHIATRY & NEUROLOGY
Payer: MEDICARE

## 2024-08-21 ENCOUNTER — APPOINTMENT (OUTPATIENT)
Dept: CT IMAGING | Age: 29
DRG: 101 | End: 2024-08-21
Attending: PSYCHIATRY & NEUROLOGY
Payer: MEDICARE

## 2024-08-21 PROBLEM — K59.00 CONSTIPATION: Status: ACTIVE | Noted: 2024-08-21

## 2024-08-21 LAB
ANION GAP SERPL CALCULATED.3IONS-SCNC: 9 MMOL/L (ref 9–16)
BASOPHILS # BLD: <0.03 K/UL (ref 0–0.2)
BASOPHILS NFR BLD: 0 % (ref 0–2)
BNP SERPL-MCNC: <36 PG/ML (ref 0–300)
BUN SERPL-MCNC: 16 MG/DL (ref 6–20)
BUN SERPL-MCNC: 18 MG/DL (ref 6–20)
CALCIUM SERPL-MCNC: 8.6 MG/DL (ref 8.6–10.4)
CHLORIDE SERPL-SCNC: 105 MMOL/L (ref 98–107)
CO2 SERPL-SCNC: 25 MMOL/L (ref 20–31)
CREAT SERPL-MCNC: 0.8 MG/DL (ref 0.7–1.2)
CREAT SERPL-MCNC: 0.9 MG/DL (ref 0.7–1.2)
EOSINOPHIL # BLD: <0.03 K/UL (ref 0–0.44)
EOSINOPHILS RELATIVE PERCENT: 0 % (ref 1–4)
ERYTHROCYTE [DISTWIDTH] IN BLOOD BY AUTOMATED COUNT: 14.5 % (ref 11.8–14.4)
GFR, ESTIMATED: >90 ML/MIN/1.73M2
GFR, ESTIMATED: >90 ML/MIN/1.73M2
GLUCOSE BLD-MCNC: 104 MG/DL (ref 75–110)
GLUCOSE SERPL-MCNC: 105 MG/DL (ref 74–99)
HCT VFR BLD AUTO: 34.6 % (ref 40.7–50.3)
HGB BLD-MCNC: 11.7 G/DL (ref 13–17)
IMM GRANULOCYTES # BLD AUTO: <0.03 K/UL (ref 0–0.3)
IMM GRANULOCYTES NFR BLD: 0 %
LACOSAMIDE: 10.5 UG/ML (ref 1–10)
LYMPHOCYTES NFR BLD: 0.72 K/UL (ref 1.1–3.7)
LYMPHOCYTES RELATIVE PERCENT: 16 % (ref 24–43)
MCH RBC QN AUTO: 29.3 PG (ref 25.2–33.5)
MCHC RBC AUTO-ENTMCNC: 33.8 G/DL (ref 28.4–34.8)
MCV RBC AUTO: 86.7 FL (ref 82.6–102.9)
MONOCYTES NFR BLD: 0.5 K/UL (ref 0.1–1.2)
MONOCYTES NFR BLD: 11 % (ref 3–12)
NEUTROPHILS NFR BLD: 73 % (ref 36–65)
NEUTS SEG NFR BLD: 3.37 K/UL (ref 1.5–8.1)
NRBC BLD-RTO: 0 PER 100 WBC
PLATELET # BLD AUTO: 149 K/UL (ref 138–453)
PMV BLD AUTO: 9.4 FL (ref 8.1–13.5)
POTASSIUM SERPL-SCNC: 3.7 MMOL/L (ref 3.7–5.3)
RBC # BLD AUTO: 3.99 M/UL (ref 4.21–5.77)
RBC # BLD: ABNORMAL 10*6/UL
SODIUM SERPL-SCNC: 138 MMOL/L (ref 136–145)
SODIUM SERPL-SCNC: 139 MMOL/L (ref 136–145)
WBC OTHER # BLD: 4.6 K/UL (ref 3.5–11.3)

## 2024-08-21 PROCEDURE — 95720 EEG PHY/QHP EA INCR W/VEEG: CPT | Performed by: PSYCHIATRY & NEUROLOGY

## 2024-08-21 PROCEDURE — 6370000000 HC RX 637 (ALT 250 FOR IP): Performed by: PSYCHIATRY & NEUROLOGY

## 2024-08-21 PROCEDURE — 80048 BASIC METABOLIC PNL TOTAL CA: CPT

## 2024-08-21 PROCEDURE — 82947 ASSAY GLUCOSE BLOOD QUANT: CPT

## 2024-08-21 PROCEDURE — C9254 INJECTION, LACOSAMIDE: HCPCS

## 2024-08-21 PROCEDURE — 6360000002 HC RX W HCPCS

## 2024-08-21 PROCEDURE — 6360000002 HC RX W HCPCS: Performed by: NURSE PRACTITIONER

## 2024-08-21 PROCEDURE — 2580000003 HC RX 258

## 2024-08-21 PROCEDURE — 6370000000 HC RX 637 (ALT 250 FOR IP)

## 2024-08-21 PROCEDURE — 95714 VEEG EA 12-26 HR UNMNTR: CPT

## 2024-08-21 PROCEDURE — 74018 RADEX ABDOMEN 1 VIEW: CPT

## 2024-08-21 PROCEDURE — 99232 SBSQ HOSP IP/OBS MODERATE 35: CPT | Performed by: PSYCHIATRY & NEUROLOGY

## 2024-08-21 PROCEDURE — 74176 CT ABD & PELVIS W/O CONTRAST: CPT

## 2024-08-21 PROCEDURE — 36415 COLL VENOUS BLD VENIPUNCTURE: CPT

## 2024-08-21 PROCEDURE — 51702 INSERT TEMP BLADDER CATH: CPT

## 2024-08-21 PROCEDURE — APPSS30 APP SPLIT SHARED TIME 16-30 MINUTES: Performed by: NURSE PRACTITIONER

## 2024-08-21 PROCEDURE — 2060000000 HC ICU INTERMEDIATE R&B

## 2024-08-21 PROCEDURE — 99231 SBSQ HOSP IP/OBS SF/LOW 25: CPT | Performed by: INTERNAL MEDICINE

## 2024-08-21 PROCEDURE — 85025 COMPLETE CBC W/AUTO DIFF WBC: CPT

## 2024-08-21 PROCEDURE — 2500000003 HC RX 250 WO HCPCS

## 2024-08-21 PROCEDURE — 6370000000 HC RX 637 (ALT 250 FOR IP): Performed by: INTERNAL MEDICINE

## 2024-08-21 RX ORDER — METOCLOPRAMIDE HYDROCHLORIDE 5 MG/ML
10 INJECTION INTRAMUSCULAR; INTRAVENOUS 3 TIMES DAILY
Status: DISCONTINUED | OUTPATIENT
Start: 2024-08-21 | End: 2024-08-23

## 2024-08-21 RX ADMIN — DEXTROSE AND SODIUM CHLORIDE: 5; 450 INJECTION, SOLUTION INTRAVENOUS at 12:30

## 2024-08-21 RX ADMIN — FLUOXETINE HYDROCHLORIDE 40 MG: 20 CAPSULE ORAL at 10:57

## 2024-08-21 RX ADMIN — DEXTROSE AND SODIUM CHLORIDE: 5; 450 INJECTION, SOLUTION INTRAVENOUS at 22:32

## 2024-08-21 RX ADMIN — LACOSAMIDE 200 MG: 10 INJECTION INTRAVENOUS at 23:25

## 2024-08-21 RX ADMIN — CLONIDINE HYDROCHLORIDE 0.2 MG: 0.1 TABLET ORAL at 21:46

## 2024-08-21 RX ADMIN — ENOXAPARIN SODIUM 40 MG: 100 INJECTION SUBCUTANEOUS at 09:51

## 2024-08-21 RX ADMIN — CHLORPROMAZINE HYDROCHLORIDE 300 MG: 100 TABLET, FILM COATED ORAL at 21:42

## 2024-08-21 RX ADMIN — LACOSAMIDE 200 MG: 10 INJECTION INTRAVENOUS at 10:59

## 2024-08-21 RX ADMIN — POLYETHYLENE GLYCOL 3350 17 G: 17 POWDER, FOR SOLUTION ORAL at 09:50

## 2024-08-21 RX ADMIN — CLONIDINE HYDROCHLORIDE 0.1 MG: 0.1 TABLET ORAL at 09:55

## 2024-08-21 RX ADMIN — VALPROATE SODIUM 1250 MG: 100 INJECTION, SOLUTION INTRAVENOUS at 00:16

## 2024-08-21 RX ADMIN — OLANZAPINE 15 MG: 10 TABLET, FILM COATED ORAL at 10:58

## 2024-08-21 RX ADMIN — Medication 1000 MG: at 10:58

## 2024-08-21 RX ADMIN — CHLORPROMAZINE HYDROCHLORIDE 50 MG: 100 TABLET, FILM COATED ORAL at 09:52

## 2024-08-21 RX ADMIN — METOCLOPRAMIDE 10 MG: 5 INJECTION, SOLUTION INTRAMUSCULAR; INTRAVENOUS at 21:18

## 2024-08-21 RX ADMIN — OLANZAPINE 10 MG: 10 TABLET, FILM COATED ORAL at 21:40

## 2024-08-21 RX ADMIN — CHLORPROMAZINE HYDROCHLORIDE 100 MG: 100 TABLET, FILM COATED ORAL at 14:12

## 2024-08-21 RX ADMIN — METOCLOPRAMIDE 10 MG: 5 INJECTION, SOLUTION INTRAMUSCULAR; INTRAVENOUS at 10:56

## 2024-08-21 RX ADMIN — SENNOSIDES 17.2 MG: 8.6 TABLET, FILM COATED ORAL at 21:41

## 2024-08-21 RX ADMIN — VALPROATE SODIUM 1250 MG: 100 INJECTION, SOLUTION INTRAVENOUS at 23:43

## 2024-08-21 RX ADMIN — METOCLOPRAMIDE 10 MG: 5 INJECTION, SOLUTION INTRAMUSCULAR; INTRAVENOUS at 14:32

## 2024-08-21 RX ADMIN — TROSPIUM CHLORIDE 20 MG: 20 TABLET, FILM COATED ORAL at 15:49

## 2024-08-21 RX ADMIN — SODIUM CHLORIDE, PRESERVATIVE FREE 10 ML: 5 INJECTION INTRAVENOUS at 21:35

## 2024-08-21 RX ADMIN — SODIUM CHLORIDE, PRESERVATIVE FREE 10 ML: 5 INJECTION INTRAVENOUS at 09:56

## 2024-08-21 RX ADMIN — DEXTROSE AND SODIUM CHLORIDE: 5; 450 INJECTION, SOLUTION INTRAVENOUS at 01:21

## 2024-08-21 RX ADMIN — VALPROATE SODIUM 1250 MG: 100 INJECTION, SOLUTION INTRAVENOUS at 12:14

## 2024-08-21 RX ADMIN — CHLORPROMAZINE HYDROCHLORIDE 200 MG: 100 TABLET, FILM COATED ORAL at 10:56

## 2024-08-21 RX ADMIN — TROSPIUM CHLORIDE 20 MG: 20 TABLET, FILM COATED ORAL at 09:51

## 2024-08-21 ASSESSMENT — PAIN SCALES - WONG BAKER
WONGBAKER_NUMERICALRESPONSE: NO HURT

## 2024-08-21 NOTE — PLAN OF CARE
Problem: Discharge Planning  Goal: Discharge to home or other facility with appropriate resources  8/21/2024 0247 by Lacy Monteiro RN  Outcome: Progressing  8/20/2024 1803 by Lucina Nguyen RN  Outcome: Progressing  Flowsheets (Taken 8/20/2024 1117)  Discharge to home or other facility with appropriate resources: Identify barriers to discharge with patient and caregiver     Problem: Neurosensory - Adult  Goal: Achieves stable or improved neurological status  8/21/2024 0247 by Lacy Monteiro RN  Outcome: Progressing  8/20/2024 1803 by Lucina Nguyen RN  Outcome: Progressing  Flowsheets (Taken 8/20/2024 1117)  Achieves stable or improved neurological status: Assess for and report changes in neurological status  Goal: Absence of seizures  8/21/2024 0247 by Lacy Monteiro RN  Outcome: Progressing  8/20/2024 1803 by Lucina Nguyen RN  Outcome: Progressing  Flowsheets (Taken 8/20/2024 1117)  Absence of seizures: Monitor for seizure activity.  If seizure occurs, document type and location of movements and any associated apnea  Goal: Remains free of injury related to seizures activity  8/21/2024 0247 by Lacy Monteiro RN  Outcome: Progressing  8/20/2024 1803 by Lucina Nguyen RN  Outcome: Progressing  Flowsheets (Taken 8/20/2024 1117)  Remains free of injury related to seizure activity: Maintain airway, patient safety  and administer oxygen as ordered  Goal: Achieves maximal functionality and self care  8/21/2024 0247 by Lacy Monteiro RN  Outcome: Progressing  8/20/2024 1803 by Lucina Nguyen RN  Outcome: Progressing  Flowsheets (Taken 8/20/2024 1117)  Achieves maximal functionality and self care: Monitor swallowing and airway patency with patient fatigue and changes in neurological status     Problem: Genitourinary - Adult  Goal: Absence of urinary retention  8/21/2024 0247 by Lacy Monteiro RN  Outcome: Progressing  8/20/2024 1803 by Lucina Nguyen RN  Outcome: Progressing  Flowsheets (Taken 8/20/2024 1600)  Absence of  urinary retention: Assess patient’s ability to void and empty bladder  Goal: Urinary catheter remains patent  8/21/2024 0247 by Lacy Monteiro RN  Outcome: Progressing  8/20/2024 1803 by Lucina Nguyen RN  Outcome: Progressing  Flowsheets (Taken 8/20/2024 1600)  Urinary catheter remains patent: Assess patency of urinary catheter     Problem: Pain  Goal: Verbalizes/displays adequate comfort level or baseline comfort level  8/21/2024 0247 by Lacy Monteiro RN  Outcome: Progressing  8/20/2024 1803 by Lucina Nguyen RN  Outcome: Progressing  Flowsheets  Taken 8/20/2024 1515  Verbalizes/displays adequate comfort level or baseline comfort level: Encourage patient to monitor pain and request assistance  Taken 8/20/2024 1400  Verbalizes/displays adequate comfort level or baseline comfort level: Encourage patient to monitor pain and request assistance  Taken 8/20/2024 0743  Verbalizes/displays adequate comfort level or baseline comfort level: Encourage patient to monitor pain and request assistance     Problem: ABCDS Injury Assessment  Goal: Absence of physical injury  8/21/2024 0247 by Lacy Monteiro RN  Outcome: Progressing  8/20/2024 1803 by Lucina Nguyen RN  Outcome: Progressing

## 2024-08-21 NOTE — PLAN OF CARE
Problem: Discharge Planning  Goal: Discharge to home or other facility with appropriate resources  8/21/2024 1606 by Lilia Gonzáles RN  Outcome: Progressing  Flowsheets (Taken 8/21/2024 0800)  Discharge to home or other facility with appropriate resources:   Identify barriers to discharge with patient and caregiver   Identify discharge learning needs (meds, wound care, etc)   Arrange for needed discharge resources and transportation as appropriate  8/21/2024 0247 by Lacy Monteiro RN  Outcome: Progressing     Problem: Neurosensory - Adult  Goal: Achieves stable or improved neurological status  8/21/2024 1606 by Lilia Gonzáles RN  Outcome: Progressing  Flowsheets (Taken 8/21/2024 0800)  Achieves stable or improved neurological status:   Assess for and report changes in neurological status   Initiate measures to prevent increased intracranial pressure   Maintain blood pressure and fluid volume within ordered parameters to optimize cerebral perfusion and minimize risk of hemorrhage   Monitor temperature, glucose, and sodium. Initiate appropriate interventions as ordered  8/21/2024 0247 by Lacy Monteiro RN  Outcome: Progressing  Goal: Absence of seizures  8/21/2024 1606 by Lilia Gonzáles RN  Outcome: Progressing  Flowsheets (Taken 8/21/2024 0800)  Absence of seizures:   Monitor for seizure activity.  If seizure occurs, document type and location of movements and any associated apnea   If seizure occurs, turn head to side and suction secretions as needed   Administer anticonvulsants as ordered   Diagnostic studies as ordered   Support airway/breathing, administer oxygen as needed  8/21/2024 0247 by Lacy Monteiro RN  Outcome: Progressing  Goal: Remains free of injury related to seizures activity  8/21/2024 1606 by Lilia Gonzáles RN  Outcome: Progressing  Flowsheets (Taken 8/21/2024 0800)  Remains free of injury related to seizure activity:   Maintain airway, patient safety  and administer oxygen as ordered    social influences on pain and pain management  8/21/2024 0247 by Lacy Monteiro, RN  Outcome: Progressing     Problem: ABCDS Injury Assessment  Goal: Absence of physical injury  8/21/2024 1606 by Lilia Gonzáles, RN  Outcome: Progressing  Flowsheets (Taken 8/21/2024 0800)  Absence of Physical Injury: Implement safety measures based on patient assessment  8/21/2024 0247 by Lacy Monteiro, RN  Outcome: Progressing

## 2024-08-21 NOTE — PROGRESS NOTES
[facility] Neurology   IN-PATIENT SERVICE  General Neurology Progress Note   [facility]                Date:   8/21/2024  Patient name:  Yuriy Whelan  Date of admission:  8/16/2024  7:30 PM  MRN:   [id number]  Account:  [id number]  YOB: 1995  PCP:    Keshawn Carranza MD  Room:   [address] [place] [address]  Code Status:    Full Code  Chief Complaint: No chief complaint on file.      Interval history      The patient was seen and examined at bedside this morning.     Patient had a focal onset seizure that started with left gaze deviation and progression to GTC seizure. LTME was started afterwards that has shown diffuse slowing, no epileptiform discharges.     Labs, chart, and VS reviewed.  No acute events overnight.    Brief History     Yuriy Whelan is a 28-year-old male with past medical history of autism, intellectual disability, epilepsy lives at a group home transferred from [facility] with concern of 1 episode of generalized tonic-clonic seizure yesterday.  Patient had 1 episode of seizure yesterday and was taken to [facility].  He was taking Vimpat 150 twice daily group home, which was increased to 200 Mg twice daily in the ED and was discharged back to the group home from the ED. after the discharge from the hospital, patient was not acting like himself.  He was not following commands, not to using the restroom by himself and was not able to eat by himself which is quite new for the patient as he used to do all those activities by himself before the seizure episode.  He returned back to emergency [facility] and was transferred to us.     Patient had a history of epilepsy and was maintained on Vimpat 150 mg twice daily.  As per the caregiver, the patient did not have any seizure-like episode in the past 3 to 4 years.     On exam today, patient is alert but nonverbal which is his baseline.He followed simple commands and was able to track the fingers and lift his legs up  IntraVENous PRN Antonio Costa MD        ondansetron (ZOFRAN-ODT) disintegrating tablet 4 mg  4 mg Oral Q8H PRN Antonio Costa MD        Or    ondansetron (ZOFRAN) injection 4 mg  4 mg IntraVENous Q6H PRN Antonio Costa MD        acetaminophen (TYLENOL) tablet 650 mg  650 mg Oral Q6H PRN Antonio Costa MD        Or    acetaminophen (TYLENOL) suppository 650 mg  650 mg Rectal Q6H PRN Antonio Costa MD            Allergies: Patient has no known allergies.    Neurological Exam     BP (!) 152/105   Pulse 100   Temp 97.9 °F (36.6 °C) (Axillary)   Resp 17   Ht 1.702 m (5' 7.01\")   Wt 105 kg (231 lb 7.7 oz)   SpO2 99%   BMI 36.25 kg/m²         CONSTITUTIONAL:  Well developed, well nourished, alert and oriented x 0, pt is nonverbal which is his baseline, in no acute distress.    HEAD:  normocephalic, atraumatic    EYES:  PERRLA, EOMI.   ENT:  moist mucous membranes   NECK:  supple, symmetric, no midline tenderness to palpation    BACK:  without midline tenderness, step-offs or deformities    LUNGS:  Equal air entry bilaterally   CARDIOVASCULAR:  normal s1 / s2   ABDOMEN:  Soft, distended   NEUROLOGIC:  Mental Status:  pt nonverbal,awake             Cranial Nerves:    cranial nerves II-XII are grossly intact    Motor Exam:    Drift:  absent  Tone:  normal    Motor exam is symmetrical 5 out of 5 all extremities bilaterally    Sensory:    Touch:    Right Upper Extremity:  normal  Left Upper Extremity:  normal  Right Lower Extremity:  normal  Left Lower Extremity:  normal    Deep Tendon Reflexes:    Right Bicep:  2+  Left Bicep:  2+  Right Knee:  2+  Left Knee:  2+    Plantar Response:  Right:  downgoing  Left:  downgoing    Clonus:  N/A  Cortez's:  N/A    Coordination/Dysmetria:  Heel to Shin:  Right:  normal  Left:  normal  Finger to Nose:   Right:  normal  Left:  normal   Dysdiadochokinesia:  N/A    Gait:  not assessed     SKIN:  no rash          Investigations   Laboratory Testing:  Recent Results

## 2024-08-21 NOTE — PROGRESS NOTES
[facility]    Gastroenterology Daily Progress Note      Patient:   Yuriy Whelan   :    1995   Facility:   [facility]   Date:     2024  Consultant:   CHELSEA Call CNP, CNP      SUBJECTIVE  28 y.o. male admitted 2024 with Altered mental status [R41.82]  Seizure (HCC) [R56.9] and seen for constipation. The pt was seen and examined. No BM overnight, kub this am shows continued constipation, no obvious free air, rectal tube was not inserted yesterday. No emesis. .        OBJECTIVE  Scheduled Meds:   levETIRAcetam  4,000 mg IntraVENous Once    valproate (DEPACON) 1,250 mg in sodium chloride 0.9 % 100 mL IVPB  1,250 mg IntraVENous Q12H    senna  2 tablet Oral Nightly    polyethylene glycol  17 g Oral BID    cefTRIAXone (ROCEPHIN) IV  1,000 mg IntraVENous Q24H    cloNIDine  0.1 mg Oral BID    FLUoxetine  40 mg Oral Daily    trospium  20 mg Oral BID AC    OLANZapine  15 mg Oral Daily    OLANZapine  10 mg Oral Nightly    cloNIDine  0.2 mg Oral Nightly    chlorproMAZINE  50 mg Oral Daily    chlorproMAZINE  100 mg Oral Lunch    chlorproMAZINE  200 mg Oral Daily    chlorproMAZINE  300 mg Oral Nightly    enoxaparin  40 mg SubCUTAneous Daily    lacosamide  200 mg IntraVENous BID    sodium chloride flush  5-40 mL IntraVENous 2 times per day       Vital Signs:  BP (!) 145/96   Pulse 100   Temp 98.5 °F (36.9 °C) (Axillary)   Resp 17   Ht 1.702 m (5' 7.01\")   Wt 105 kg (231 lb 7.7 oz)   SpO2 99%   BMI 36.25 kg/m²    ROS not completed  pt is non verbal hx MRDD  Physical Exam:       General Appearance: alert and  well-developed and well-nourished, in no acute distress  Skin: warm and dry, no rash or erythema  Head: normocephalic and atraumatic  Eyes: pupils equal, round, and reactive to light, extraocular eye movements intact, conjunctivae normal  ENT: hearing grossly normal bilaterally  Neck: neck supple and non tender without mass, no thyromegaly or thyroid  Mounika Fish, APRN - CNP on 8/21/2024 at 7:23 AM

## 2024-08-21 NOTE — CARE COORDINATION
Consult received as mom/legal guardian requested to speak to SW.  Met with mom at length as she is very torn regarding pt's discharge plan.  She stated that pt has lived at [facility] for 10 years and she has not had any issues until now.  Pt tested positive for the drug PCP.  Mom states that she has contacted pt's DD  Leatha/[phone] who works for [employer] of  as family lives in that [facility].  She has also been in contact with several administrators at [facility].  She states that they have started an investigation on this incident.  Pam states that she is not sure that she wants pt to go back there as she does not know if she trusts them.  She has asked her CM about other openings in other facilities.  Called and left a message for CM Leatha to find out other appropriate facilities that can handle pt's needs.  Also called and spoke to Enmanuel/supervisor of [employer] at [facility].  He stated that they have an ongoing investigation and that the [place] Dept of [employer] is also in on the case.

## 2024-08-21 NOTE — PROCEDURES
EEG recording with time-locked video. Electrodes were placed in accordance with the 10-20 International System of Electrode Placement. Single lead EKG monitoring was included.    Baseline EEG Recording:  A formal baseline EEG recording was not obtained.     Day 1 - 8/20/24, starting at 14:01    Interictal EEG Samples: The background was continuous, disorganized without an AP gradient, and diffusely slow, composed primarily of theta-delta with overriding faster frequencies.  There was no discernible PDR.  There was evidence of reactivity and state changes.  Symmetric sleep architecture, at times poorly formed, was visualized.    There were no focal or epileptiform abnormalities.    The EKG channel revealed sinus tachycardia.    Ictal EEG Recording / Patient Events: During this period the patient had no events or seizures.    Summary: During this day of recording no events were recorded. The interictal EEG was abnormal due to:  -Diffuse background slowing and disorganization    The background abnormalities indicated a moderate encephalopathy, nonspecific to etiology.  No focal or epileptiform discharges were visualized.    Monitoring was continued in order to evaluate for seizures. The EKG channel revealed sinus tachycardia.    Please note this is a preliminary report and updated daily. The final report will have a summary of behavior and electrographic findings with clinical correlation.    Sudha Arboleda, DO  Neurology/Epilepsy     Day 2 - 8/21/24, interrupted between 05:57-07:21 and 11:18-12:06, reviewed through 17:41    Interictal EEG Samples: The background was continuous, mildly disorganized without a well sustained AP gradient, and mildly slow with excess theta during wakefulness.  There was a fairly symmetric 9-10 Hz PDR. During drowsiness there were bursts of diffuse slowing and waxing and waning of the posterior dominant rhythm. During stage II sleep symmetric V  waves, K complexes, and sleep spindles  were seen.    There was frequent right temporal polymorphic delta slowing.    The EKG channel revealed sinus tachycardia.    Ictal EEG Recording / Patient Events: During this period the patient had no events or seizures.    Summary: During this day of recording no events were recorded. The interictal EEG was abnormal due to:  -Diffuse background slowing and disorganization, improved compared to the previous day  -Frequent right temporal slowing    The background abnormalities indicated a mild encephalopathy, nonspecific to etiology, improved compared to the previous day.  The right temporal slowing indicated underlying focal neuronal dysfunction. No epileptiform discharges or seizures were recorded.    Monitoring was continued in order to evaluate for seizures. The EKG channel revealed sinus tachycardia.    Please note this is a preliminary report and updated daily. The final report will have a summary of behavior and electrographic findings with clinical correlation.    Sudha Arboleda, DO  Neurology/Epilepsy

## 2024-08-21 NOTE — PLAN OF CARE
Ct abd results reviewed and d/w dr cochran, continue the senna reglan and glycolax. Pt with no emesis, still passing flatus has bowel sounds. Dr cochran wanted to hold on decompressive colonoscopy today If no bm tomorrow may need neostigmine to be given. Recommend rectal tube. Message sent to dr gregg ..Mounika Fish, APRN - CNP

## 2024-08-21 NOTE — CARE COORDINATION
TRANSITIONAL CARE/DISCHARGE PLANNING ONGOING EVALUATION      Reason for Admission: Altered mental status [R41.82]  Seizure (HCC) [R56.9]     Hospital day:5    Patient goals/Transitional Plan:    Patient parent requested to speak with , stated she is interested in a new facility due to issues at current facility        Readmission Risk              Risk of Unplanned Readmission:  14

## 2024-08-22 ENCOUNTER — APPOINTMENT (OUTPATIENT)
Dept: GENERAL RADIOLOGY | Age: 29
DRG: 101 | End: 2024-08-22
Attending: PSYCHIATRY & NEUROLOGY
Payer: MEDICARE

## 2024-08-22 PROBLEM — R94.01 ABNORMAL EEG: Status: ACTIVE | Noted: 2024-08-22

## 2024-08-22 LAB
ALBUMIN SERPL-MCNC: 3.6 G/DL (ref 3.5–5.2)
ALBUMIN/GLOB SERPL: 2 {RATIO} (ref 1–2.5)
ALP SERPL-CCNC: 94 U/L (ref 40–129)
ALT SERPL-CCNC: 43 U/L (ref 10–50)
ANION GAP SERPL CALCULATED.3IONS-SCNC: 10 MMOL/L (ref 9–16)
AST SERPL-CCNC: 38 U/L (ref 10–50)
BASOPHILS # BLD: <0.03 K/UL (ref 0–0.2)
BASOPHILS NFR BLD: 0 % (ref 0–2)
BILIRUB DIRECT SERPL-MCNC: 0.4 MG/DL (ref 0–0.2)
BILIRUB INDIRECT SERPL-MCNC: 0.3 MG/DL (ref 0–1)
BILIRUB SERPL-MCNC: 0.7 MG/DL (ref 0–1.2)
BUN SERPL-MCNC: 8 MG/DL (ref 6–20)
CALCIUM SERPL-MCNC: 8.6 MG/DL (ref 8.6–10.4)
CHLORIDE SERPL-SCNC: 105 MMOL/L (ref 98–107)
CO2 SERPL-SCNC: 23 MMOL/L (ref 20–31)
CREAT SERPL-MCNC: 0.7 MG/DL (ref 0.7–1.2)
EOSINOPHIL # BLD: <0.03 K/UL (ref 0–0.44)
EOSINOPHILS RELATIVE PERCENT: 0 % (ref 1–4)
ERYTHROCYTE [DISTWIDTH] IN BLOOD BY AUTOMATED COUNT: 14.2 % (ref 11.8–14.4)
GFR, ESTIMATED: >90 ML/MIN/1.73M2
GLOBULIN SER CALC-MCNC: 2.4 G/DL
GLUCOSE BLD-MCNC: 90 MG/DL (ref 75–110)
GLUCOSE SERPL-MCNC: 97 MG/DL (ref 74–99)
HCT VFR BLD AUTO: 35 % (ref 40.7–50.3)
HGB BLD-MCNC: 11.5 G/DL (ref 13–17)
IMM GRANULOCYTES # BLD AUTO: <0.03 K/UL (ref 0–0.3)
IMM GRANULOCYTES NFR BLD: 0 %
LYMPHOCYTES NFR BLD: 0.78 K/UL (ref 1.1–3.7)
LYMPHOCYTES RELATIVE PERCENT: 22 % (ref 24–43)
MCH RBC QN AUTO: 29.3 PG (ref 25.2–33.5)
MCHC RBC AUTO-ENTMCNC: 32.9 G/DL (ref 28.4–34.8)
MCV RBC AUTO: 89.1 FL (ref 82.6–102.9)
MONOCYTES NFR BLD: 0.43 K/UL (ref 0.1–1.2)
MONOCYTES NFR BLD: 12 % (ref 3–12)
NEUTROPHILS NFR BLD: 66 % (ref 36–65)
NEUTS SEG NFR BLD: 2.37 K/UL (ref 1.5–8.1)
NRBC BLD-RTO: 0 PER 100 WBC
PLATELET # BLD AUTO: 150 K/UL (ref 138–453)
PMV BLD AUTO: 9.6 FL (ref 8.1–13.5)
POTASSIUM SERPL-SCNC: 3.8 MMOL/L (ref 3.7–5.3)
PROT SERPL-MCNC: 6 G/DL (ref 6.6–8.7)
RBC # BLD AUTO: 3.93 M/UL (ref 4.21–5.77)
SODIUM SERPL-SCNC: 138 MMOL/L (ref 136–145)
WBC OTHER # BLD: 3.6 K/UL (ref 3.5–11.3)

## 2024-08-22 PROCEDURE — 6370000000 HC RX 637 (ALT 250 FOR IP): Performed by: PSYCHIATRY & NEUROLOGY

## 2024-08-22 PROCEDURE — 80048 BASIC METABOLIC PNL TOTAL CA: CPT

## 2024-08-22 PROCEDURE — 80165 DIPROPYLACETIC ACID FREE: CPT

## 2024-08-22 PROCEDURE — 2580000003 HC RX 258

## 2024-08-22 PROCEDURE — 85025 COMPLETE CBC W/AUTO DIFF WBC: CPT

## 2024-08-22 PROCEDURE — 80076 HEPATIC FUNCTION PANEL: CPT

## 2024-08-22 PROCEDURE — 2580000003 HC RX 258: Performed by: NURSE PRACTITIONER

## 2024-08-22 PROCEDURE — 2060000000 HC ICU INTERMEDIATE R&B

## 2024-08-22 PROCEDURE — 6360000002 HC RX W HCPCS

## 2024-08-22 PROCEDURE — 36415 COLL VENOUS BLD VENIPUNCTURE: CPT

## 2024-08-22 PROCEDURE — 99231 SBSQ HOSP IP/OBS SF/LOW 25: CPT | Performed by: INTERNAL MEDICINE

## 2024-08-22 PROCEDURE — 99232 SBSQ HOSP IP/OBS MODERATE 35: CPT | Performed by: PSYCHIATRY & NEUROLOGY

## 2024-08-22 PROCEDURE — 80164 ASSAY DIPROPYLACETIC ACD TOT: CPT

## 2024-08-22 PROCEDURE — 82947 ASSAY GLUCOSE BLOOD QUANT: CPT

## 2024-08-22 PROCEDURE — 6360000002 HC RX W HCPCS: Performed by: NURSE PRACTITIONER

## 2024-08-22 PROCEDURE — 2500000003 HC RX 250 WO HCPCS

## 2024-08-22 PROCEDURE — 74018 RADEX ABDOMEN 1 VIEW: CPT

## 2024-08-22 PROCEDURE — 6370000000 HC RX 637 (ALT 250 FOR IP): Performed by: INTERNAL MEDICINE

## 2024-08-22 PROCEDURE — C9254 INJECTION, LACOSAMIDE: HCPCS

## 2024-08-22 PROCEDURE — 6370000000 HC RX 637 (ALT 250 FOR IP)

## 2024-08-22 PROCEDURE — 95718 EEG PHYS/QHP 2-12 HR W/VEEG: CPT | Performed by: PSYCHIATRY & NEUROLOGY

## 2024-08-22 PROCEDURE — 95711 VEEG 2-12 HR UNMONITORED: CPT

## 2024-08-22 RX ORDER — LUBIPROSTONE 8 UG/1
8 CAPSULE ORAL ONCE
Status: COMPLETED | OUTPATIENT
Start: 2024-08-22 | End: 2024-08-22

## 2024-08-22 RX ORDER — BISACODYL 10 MG
10 SUPPOSITORY, RECTAL RECTAL ONCE
Status: COMPLETED | OUTPATIENT
Start: 2024-08-22 | End: 2024-08-22

## 2024-08-22 RX ADMIN — TROSPIUM CHLORIDE 20 MG: 20 TABLET, FILM COATED ORAL at 15:32

## 2024-08-22 RX ADMIN — CHLORPROMAZINE HYDROCHLORIDE 100 MG: 100 TABLET, FILM COATED ORAL at 11:48

## 2024-08-22 RX ADMIN — POLYETHYLENE GLYCOL 3350 17 G: 17 POWDER, FOR SOLUTION ORAL at 21:32

## 2024-08-22 RX ADMIN — SODIUM CHLORIDE, PRESERVATIVE FREE 10 ML: 5 INJECTION INTRAVENOUS at 21:35

## 2024-08-22 RX ADMIN — CHLORPROMAZINE HYDROCHLORIDE 50 MG: 100 TABLET, FILM COATED ORAL at 08:54

## 2024-08-22 RX ADMIN — DEXTROSE AND SODIUM CHLORIDE: 5; 450 INJECTION, SOLUTION INTRAVENOUS at 09:11

## 2024-08-22 RX ADMIN — CLONIDINE HYDROCHLORIDE 0.2 MG: 0.1 TABLET ORAL at 21:32

## 2024-08-22 RX ADMIN — CHLORPROMAZINE HYDROCHLORIDE 200 MG: 100 TABLET, FILM COATED ORAL at 08:51

## 2024-08-22 RX ADMIN — SODIUM CHLORIDE, PRESERVATIVE FREE 10 ML: 5 INJECTION INTRAVENOUS at 08:53

## 2024-08-22 RX ADMIN — CHLORPROMAZINE HYDROCHLORIDE 300 MG: 100 TABLET, FILM COATED ORAL at 21:33

## 2024-08-22 RX ADMIN — METOCLOPRAMIDE 10 MG: 5 INJECTION, SOLUTION INTRAMUSCULAR; INTRAVENOUS at 21:40

## 2024-08-22 RX ADMIN — CLONIDINE HYDROCHLORIDE 0.1 MG: 0.1 TABLET ORAL at 11:46

## 2024-08-22 RX ADMIN — BISACODYL 10 MG: 10 SUPPOSITORY RECTAL at 21:43

## 2024-08-22 RX ADMIN — SODIUM CHLORIDE: 9 INJECTION, SOLUTION INTRAVENOUS at 12:58

## 2024-08-22 RX ADMIN — ERYTHROMYCIN LACTOBIONATE 250 MG: 500 INJECTION, POWDER, LYOPHILIZED, FOR SOLUTION INTRAVENOUS at 13:00

## 2024-08-22 RX ADMIN — LACOSAMIDE 200 MG: 10 INJECTION INTRAVENOUS at 11:47

## 2024-08-22 RX ADMIN — VALPROATE SODIUM 1250 MG: 100 INJECTION, SOLUTION INTRAVENOUS at 12:03

## 2024-08-22 RX ADMIN — Medication 1000 MG: at 11:46

## 2024-08-22 RX ADMIN — CLONIDINE HYDROCHLORIDE 0.1 MG: 0.1 TABLET ORAL at 08:55

## 2024-08-22 RX ADMIN — OLANZAPINE 15 MG: 10 TABLET, FILM COATED ORAL at 08:51

## 2024-08-22 RX ADMIN — FLUOXETINE HYDROCHLORIDE 40 MG: 20 CAPSULE ORAL at 08:52

## 2024-08-22 RX ADMIN — LUBIPROSTONE 8 MCG: 8 CAPSULE, GELATIN COATED ORAL at 21:33

## 2024-08-22 RX ADMIN — ERYTHROMYCIN LACTOBIONATE 250 MG: 500 INJECTION, POWDER, LYOPHILIZED, FOR SOLUTION INTRAVENOUS at 19:27

## 2024-08-22 RX ADMIN — TROSPIUM CHLORIDE 20 MG: 20 TABLET, FILM COATED ORAL at 08:51

## 2024-08-22 RX ADMIN — OLANZAPINE 10 MG: 10 TABLET, FILM COATED ORAL at 23:00

## 2024-08-22 RX ADMIN — ENOXAPARIN SODIUM 40 MG: 100 INJECTION SUBCUTANEOUS at 08:51

## 2024-08-22 RX ADMIN — METOCLOPRAMIDE 10 MG: 5 INJECTION, SOLUTION INTRAMUSCULAR; INTRAVENOUS at 13:46

## 2024-08-22 RX ADMIN — LORAZEPAM 2 MG: 2 INJECTION INTRAMUSCULAR; INTRAVENOUS at 13:56

## 2024-08-22 RX ADMIN — LACOSAMIDE 200 MG: 10 INJECTION INTRAVENOUS at 23:42

## 2024-08-22 RX ADMIN — METOCLOPRAMIDE 10 MG: 5 INJECTION, SOLUTION INTRAMUSCULAR; INTRAVENOUS at 08:52

## 2024-08-22 ASSESSMENT — PAIN SCALES - WONG BAKER: WONGBAKER_NUMERICALRESPONSE: NO HURT

## 2024-08-22 NOTE — PROGRESS NOTES
[facility]   Gastroenterology Progress Note    Yuriy Whelan is a 28 y.o. male patient.  Hospitalization Day:6      Chief consult reason:   Colonic ileus/distal obstruction    Subjective:  Patient seen and examined, no acute events overnight  Patient appears much more awake today looking around attempting to be interactive  No BM overnight.  Abdomen is distended but soft, no facial grimacing with deep palpation  A.m. KUB still shows heavy stool burden      VITALS:  BP (!) 146/98   Pulse (!) 102   Temp 98.2 °F (36.8 °C) (Axillary)   Resp 15   Ht 1.702 m (5' 7.01\")   Wt 105 kg (231 lb 7.7 oz)   SpO2 100%   BMI 36.25 kg/m²   TEMPERATURE:  Current - Temp: 98.2 °F (36.8 °C); Max - Temp  Av.7 °F (37.1 °C)  Min: 98.1 °F (36.7 °C)  Max: 99.7 °F (37.6 °C)    Physical Assessment:  General appearance: Awake  Mental Status: BOOM  Lungs:  clear to auscultation bilaterally, normal effort  Heart:  regular rate and rhythm, no murmur  Abdomen:  soft, nontender, nondistended, normal bowel sounds, no masses, hepatomegaly, splenomegaly  Extremities:  no edema, redness, tenderness in the calves  Skin:  no gross lesions, rashes, induration    Data Review:    Labs and Imaging:     CBC:  Recent Labs     24  0121 24  0700 24  0609   WBC 8.0 4.6 3.6   HGB 12.8* 11.7* 11.5*   MCV 88.3 86.7 89.1   RDW 14.2 14.5* 14.2    149 150       ANEMIA STUDIES:  No results for input(s): \"TIBC\", \"FERRITIN\", \"MNGVXYPE04\", \"FOLATE\", \"OCCULTBLD\" in the last 72 hours.    Invalid input(s): \"LABIRON\"    BMP:  Recent Labs     24  2311 24  2311 24  0121 24  2353 24  0700 24  0609   NA  --    < > 135* 138 139 138   K 4.5  --  4.4  --  3.7 3.8   CL  --   --  100  --  105 105   CO2  --   --  22  --  25 23   BUN  --    < > 15 18 16 8   CREATININE  --    < > 0.9 0.9 0.8 0.7   GLUCOSE  --   --  108*  --  105* 97   CALCIUM  --   --  9.4  --  8.6 8.6   MG 2.2  --   --   --   --   --  MD    Thank you for allowing me to participate in the care of your patient.  Please feel free to contact me with any questions or concerns.     [facility] Gastroenterology   Jay Magdaleno, APRN - CNP   [phone]  8/22/2024  8:04 AM    Estimated time of 20 mins reviewing chart, assessing patient and formulating plan of care    This note was created with the assistance of a speech-recognition program.  Although the intention is to generate a document that actually reflects the content of the visit, no guarantees can be provided that every mistake has been identified and corrected by editing.

## 2024-08-22 NOTE — PROCEDURES
LONG-TERM EEG-VIDEO MONITORING   CLINICAL NEUROPHYSIOLOGY LABORATORY  DEPARTMENT OF NEUROLOGY  [facility]    Patient: Yuriy Whelan  Age: 28 y.o.  MRN: [id number]    Referring Physician: Abdelrahman Riddle MD  History: The patient is a 28 y.o. male with hx of epilepsy who had a prolonged seizure. This long-term video-EEG monitoring study was performed to evaluate for seizures. The patient is on neuroactive medications.   Yuriy Whelan   Current Facility-Administered Medications   Medication Dose Route Frequency Provider Last Rate Last Admin    metoclopramide (REGLAN) injection 10 mg  10 mg IntraVENous TID Mounika Fish APRN - CNP   10 mg at 08/21/24 2118    levETIRAcetam (KEPPRA) 4,000 mg in sodium chloride 0.9 % 100 mL IVPB  4,000 mg IntraVENous Once Abiodun Winters MD        dextrose 5 % and 0.45 % sodium chloride infusion   IntraVENous Continuous Abiodun Winters  mL/hr at 08/21/24 2232 New Bag at 08/21/24 2232    valproate (DEPACON) 1,250 mg in sodium chloride 0.9 % 100 mL IVPB  1,250 mg IntraVENous Q12H Salinas Canales MD   Stopped at 08/22/24 0043    senna (SENOKOT) tablet 17.2 mg  2 tablet Oral Nightly Shereen Paige APRN - CNP   17.2 mg at 08/21/24 2141    polyethylene glycol (GLYCOLAX) packet 17 g  17 g Oral BID Shereen Paige APRN - CNP   17 g at 08/21/24 0950    cefTRIAXone (ROCEPHIN) 1000 mg in sterile water 10 mL IV syringe  1,000 mg IntraVENous Q24H Salinas Canales MD   1,000 mg at 08/21/24 1058    cloNIDine (CATAPRES) tablet 0.1 mg  0.1 mg Oral BID Salinas Canales MD   0.1 mg at 08/21/24 0955    FLUoxetine (PROZAC) capsule 40 mg  40 mg Oral Daily Salinas Canales MD   40 mg at 08/21/24 1057    trospium (SANCTURA) tablet 20 mg  20 mg Oral BID AC Salinas Canales MD   20 mg at 08/21/24 1549    OLANZapine (ZYPREXA) tablet 15 mg  15 mg Oral Daily Salinas Canales MD   15 mg at 08/21/24 1051    OLANZapine (ZYPREXA) tablet 10 mg  10 mg Oral Nightly Salinas Canales MD   10 mg at 08/21/24 3664

## 2024-08-22 NOTE — PLAN OF CARE
Problem: Discharge Planning  Goal: Discharge to home or other facility with appropriate resources  Outcome: Progressing     Problem: Neurosensory - Adult  Goal: Achieves stable or improved neurological status  Outcome: Progressing  Goal: Absence of seizures  Outcome: Progressing  Goal: Remains free of injury related to seizures activity  Outcome: Progressing  Goal: Achieves maximal functionality and self care  Outcome: Progressing     Problem: Genitourinary - Adult  Goal: Absence of urinary retention  Outcome: Progressing  Goal: Urinary catheter remains patent  Outcome: Progressing     Problem: Pain  Goal: Verbalizes/displays adequate comfort level or baseline comfort level  Outcome: Progressing     Problem: ABCDS Injury Assessment  Goal: Absence of physical injury  Outcome: Progressing

## 2024-08-22 NOTE — PLAN OF CARE
Problem: Discharge Planning  Goal: Discharge to home or other facility with appropriate resources  Recent Flowsheet Documentation  Taken 8/21/2024 2000 by Hakeem Martinez RN  Discharge to home or other facility with appropriate resources:   Identify barriers to discharge with patient and caregiver   Arrange for needed discharge resources and transportation as appropriate   Identify discharge learning needs (meds, wound care, etc)     Problem: Neurosensory - Adult  Goal: Achieves stable or improved neurological status  Recent Flowsheet Documentation  Taken 8/21/2024 2000 by Hakeem Martinez RN  Achieves stable or improved neurological status:   Assess for and report changes in neurological status   Initiate measures to prevent increased intracranial pressure   Maintain blood pressure and fluid volume within ordered parameters to optimize cerebral perfusion and minimize risk of hemorrhage     Problem: Neurosensory - Adult  Goal: Absence of seizures  Recent Flowsheet Documentation  Taken 8/21/2024 2000 by Hakeem Martinez RN  Absence of seizures:   Monitor for seizure activity.  If seizure occurs, document type and location of movements and any associated apnea   If seizure occurs, turn head to side and suction secretions as needed   Administer anticonvulsants as ordered   Support airway/breathing, administer oxygen as needed     Problem: Neurosensory - Adult  Goal: Remains free of injury related to seizures activity  Recent Flowsheet Documentation  Taken 8/21/2024 2000 by Hakeem Martinez RN  Remains free of injury related to seizure activity:   Maintain airway, patient safety  and administer oxygen as ordered   Monitor patient for seizure activity, document and report duration and description of seizure to Licensed Independent Practitioner   If seizure occurs, turn patient to side and suction secretions as needed   Reorient patient post seizure   Seizure pads on all 4 side rails     Problem:  ABCDS Injury Assessment  Goal: Absence of physical injury  Recent Flowsheet Documentation  Taken 8/21/2024 2000 by Serena Chandler RN  Absence of Physical Injury: Implement safety measures based on patient assessment

## 2024-08-22 NOTE — PROGRESS NOTES
[facility] Neurology   IN-PATIENT SERVICE  General Neurology Progress Note   [facility]                Date:   8/22/2024  Patient name:  Yuriy Whelan  Date of admission:  8/16/2024  7:30 PM  MRN:   [id number]  Account:  [id number]  YOB: 1995  PCP:    Keshawn Carranza MD  Room:   [address] [place] [address]  Code Status:    Full Code  Chief Complaint: No chief complaint on file.      Interval history      The patient was seen and examined at bedside this morning. GI following for abdominal distention / constipation.     EEG showing frequent right temporal slowing.    Labs, chart, and VS reviewed.  No acute events overnight.    Brief History     Yuriy Whelan is a 28-year-old male with past medical history of autism, intellectual disability, epilepsy lives at a group home transferred from [facility] with concern of 1 episode of generalized tonic-clonic seizure yesterday.  Patient had 1 episode of seizure yesterday and was taken to [facility].  He was taking Vimpat 150 twice daily group home, which was increased to 200 Mg twice daily in the ED and was discharged back to the group home from the ED. after the discharge from the hospital, patient was not acting like himself.  He was not following commands, not to using the restroom by himself and was not able to eat by himself which is quite new for the patient as he used to do all those activities by himself before the seizure episode.  He returned back to emergency [facility] and was transferred to us.     Patient had a history of epilepsy and was maintained on Vimpat 150 mg twice daily.  As per the caregiver, the patient did not have any seizure-like episode in the past 3 to 4 years.     On exam today, patient is alert but nonverbal which is his baseline.He followed simple commands and was able to track the fingers and lift his legs up and down when asked.    8/19: patient had a seizure that started with head and gaze deviation  seizures while inpatient, now started on LTM.  Diffuse and disorganized slowing.    Breakthrough Seizure 2/2 phencyclidine intoxication vs UTI  Continue vimpat 200 mg p.o. BID  Started on Depakote 500 BID which was increased to 1250 mg BID after seizure  Check valproate level free and total 11 am on 8/22/24  MRI/ routine EEG normal  On LTM, showing diffuse slowing, 9-10 Hz PDR  Showing frequent right temporal slowing  Repeat UDS still positive for phencyclidine    Abdominal distention  GI following  Senna, reglan, and glycolax  If no bowel movement, may need neostiigmine    UTI  S/p rocephin 1 g IV daily x 5 days      PT/OT/SLP/Social work all consulted  Diet: regular adult diet  DVT Prophylaxis: lovenox 40 mg SC daily  Discharge Planning: group home    Patient to be discussed with attending physician, Dr. Luisa Mcduffie, additional recommendations may follow.      Salinas Canales MD  PGY-2 Neurology Resident  8/22/2024  7:45 AM      Copy sent to Keshawn Alcantar MD    This note is created with the assistance of a speech-recognition program. While intending to generate a document that actually reflects the content of the visit, the document can still have some errors including those of syntax and sound a- like substitutions which may escape proofreading. In such instances, actual meaning can be extrapolated by contextual derivation.

## 2024-08-22 NOTE — H&P
health dx of autism as evidence by 10  consecutive minutes of PA AND OR SIB max dose of 8 mg/24 hours)    Jailene Garcia MD   ammonium lactate (AMLACTIN) 12 % cream Apply topically daily Apply topically    Jailene Garcia MD   traZODone (DESYREL) 100 MG tablet Take 1 tablet by mouth nightly  Patient not taking: Reported on 8/16/2024    Jailene Garcia MD   mirabegron (MYRBETRIQ) 50 MG TB24 Take 50 mg by mouth daily    Jailene Garcia MD   chlorproMAZINE (THORAZINE) 100 MG tablet Take 1 tablet by mouth daily At noon  Patient not taking: Reported on 8/16/2024    Jailene Garcia MD   DIAZEPAM RE Place rectally as needed For seizures lasting longer than 5 minutes  Patient not taking: Reported on 8/17/2024    Jailene Garcia MD   diphenhydrAMINE (BENADRYL) 25 MG tablet Take 1 tablet by mouth every 6 hours as needed for Itching    Jailene Garcia MD   lacosamide (VIMPAT) 50 MG TABS tablet Take 3 tablets by mouth 2 times daily.  Patient not taking: Reported on 8/17/2024    Jailene Garcia MD   ibuprofen (IBU) 800 MG tablet Take 1 tablet by mouth every 8 hours as needed for Pain 8/18/20   Emiliana Aviles,    Melatonin 5 MG TABS tablet Take 1 tablet by mouth nightly  Patient not taking: Reported on 8/17/2024    Jailene Garcia MD   MINERAL OIL PO Take 15 mLs by mouth nightly  Patient not taking: Reported on 8/17/2024    Jailene Garcia MD   omeprazole (PRILOSEC) 20 MG capsule Take 1 capsule by mouth Daily  Patient not taking: Reported on 8/17/2024    Jailene Garcia MD   propranolol (INDERAL) 20 MG tablet Take 1 tablet by mouth 2 times daily  Patient not taking: Reported on 8/17/2024    Jailene Garcia MD   temazepam (RESTORIL) 30 MG capsule Take 1 capsule by mouth nightly.  Patient not taking: Reported on 8/17/2024    Jailene Garcia MD   Multiple Vitamins-Minerals (THERAPEUTIC MULTIVITAMIN-MINERALS) tablet Take 1 tablet by mouth  Absolute 0.43 0.10 - 1.20 k/uL    Eosinophils Absolute <0.03 0.00 - 0.44 k/uL    Basophils Absolute <0.03 0.00 - 0.20 k/uL    Immature Granulocytes Absolute <0.03 0.00 - 0.30 k/uL   Hepatic Function Panel    Collection Time: 08/22/24 10:02 AM   Result Value Ref Range    Albumin 3.6 3.5 - 5.2 g/dL    Alkaline Phosphatase 94 40 - 129 U/L    ALT 43 10 - 50 U/L    AST 38 10 - 50 U/L    Total Bilirubin 0.7 0.00 - 1.20 mg/dL    Bilirubin, Direct 0.4 (H) 0.0 - 0.2 mg/dL    Bilirubin, Indirect 0.3 0.0 - 1.0 mg/dL    Total Protein 6.0 (L) 6.6 - 8.7 g/dL    Globulin 2.4 g/dL    Albumin/Globulin Ratio 2.0 1.0 - 2.5       Imaging:   XR ABDOMEN (KUB) (SINGLE AP VIEW)    Result Date: 8/22/2024  Nonobstructive bowel gas pattern. Prominent amount of formed stool and correlation with symptomatology for constipation is needed.     CT ABDOMEN PELVIS WO CONTRAST Additional Contrast? None    Result Date: 8/21/2024  Imaging findings concerning for large bowel obstruction related to the splenic flexure without obvious mass.  Underlying stricture is difficult to exclude.  Consider further evaluation with colonoscopy.     XR ABDOMEN (KUB) (SINGLE AP VIEW)    Result Date: 8/21/2024  Moderate to large amount of stool most pronounced in the right colon.     XR ABDOMEN (KUB) (SINGLE AP VIEW)    Result Date: 8/20/2024  Nonobstructive bowel gas pattern. Prominent amount of formed stool and correlation with symptomatology for constipation is needed.     XR CHEST PORTABLE    Result Date: 8/19/2024  Normal examination.     XR ABDOMEN (KUB) (SINGLE AP VIEW)    Result Date: 8/19/2024  1. No significant change to diffuse gaseous prominence of large bowel with cecum measuring up to approximately 12 cm.  This may relate to ileus or distal obstruction.  Continued follow-up is recommended. 2. Somewhat prominent stool within ascending and transverse colon, similar to prior.     MRI BRAIN WO CONTRAST    Result Date: 8/18/2024  No acute intracranial

## 2024-08-23 ENCOUNTER — APPOINTMENT (OUTPATIENT)
Dept: GENERAL RADIOLOGY | Age: 29
DRG: 101 | End: 2024-08-23
Attending: PSYCHIATRY & NEUROLOGY
Payer: MEDICARE

## 2024-08-23 LAB
ANION GAP SERPL CALCULATED.3IONS-SCNC: 10 MMOL/L (ref 9–16)
BACTERIA URNS QL MICRO: ABNORMAL
BASOPHILS # BLD: <0.03 K/UL (ref 0–0.2)
BASOPHILS NFR BLD: 0 % (ref 0–2)
BILIRUB UR QL STRIP: NEGATIVE
BUN SERPL-MCNC: 6 MG/DL (ref 6–20)
CALCIUM SERPL-MCNC: 8.9 MG/DL (ref 8.6–10.4)
CASTS #/AREA URNS LPF: ABNORMAL /LPF (ref 0–8)
CHLORIDE SERPL-SCNC: 106 MMOL/L (ref 98–107)
CLARITY UR: CLEAR
CO2 SERPL-SCNC: 24 MMOL/L (ref 20–31)
COLOR UR: ABNORMAL
CREAT SERPL-MCNC: 0.7 MG/DL (ref 0.7–1.2)
EOSINOPHIL # BLD: <0.03 K/UL (ref 0–0.44)
EOSINOPHILS RELATIVE PERCENT: 0 % (ref 1–4)
EPI CELLS #/AREA URNS HPF: ABNORMAL /HPF (ref 0–5)
ERYTHROCYTE [DISTWIDTH] IN BLOOD BY AUTOMATED COUNT: 14 % (ref 11.8–14.4)
GFR, ESTIMATED: >90 ML/MIN/1.73M2
GLUCOSE BLD-MCNC: 83 MG/DL (ref 75–110)
GLUCOSE BLD-MCNC: 94 MG/DL (ref 75–110)
GLUCOSE BLD-MCNC: 96 MG/DL (ref 75–110)
GLUCOSE BLD-MCNC: 96 MG/DL (ref 75–110)
GLUCOSE SERPL-MCNC: 93 MG/DL (ref 74–99)
GLUCOSE UR STRIP-MCNC: NEGATIVE MG/DL
HCT VFR BLD AUTO: 36.1 % (ref 40.7–50.3)
HGB BLD-MCNC: 12.6 G/DL (ref 13–17)
HGB UR QL STRIP.AUTO: NEGATIVE
IMM GRANULOCYTES # BLD AUTO: <0.03 K/UL (ref 0–0.3)
IMM GRANULOCYTES NFR BLD: 1 %
KETONES UR STRIP-MCNC: ABNORMAL MG/DL
LEUKOCYTE ESTERASE UR QL STRIP: ABNORMAL
LYMPHOCYTES NFR BLD: 0.88 K/UL (ref 1.1–3.7)
LYMPHOCYTES RELATIVE PERCENT: 22 % (ref 24–43)
MCH RBC QN AUTO: 28.9 PG (ref 25.2–33.5)
MCHC RBC AUTO-ENTMCNC: 34.9 G/DL (ref 28.4–34.8)
MCV RBC AUTO: 82.8 FL (ref 82.6–102.9)
MONOCYTES NFR BLD: 0.49 K/UL (ref 0.1–1.2)
MONOCYTES NFR BLD: 13 % (ref 3–12)
NEUTROPHILS NFR BLD: 65 % (ref 36–65)
NEUTS SEG NFR BLD: 2.54 K/UL (ref 1.5–8.1)
NITRITE UR QL STRIP: NEGATIVE
NRBC BLD-RTO: 0 PER 100 WBC
PH UR STRIP: 7 [PH] (ref 5–8)
PLATELET # BLD AUTO: 158 K/UL (ref 138–453)
PMV BLD AUTO: 9.1 FL (ref 8.1–13.5)
POTASSIUM SERPL-SCNC: 4 MMOL/L (ref 3.7–5.3)
PROT UR STRIP-MCNC: NEGATIVE MG/DL
RBC # BLD AUTO: 4.36 M/UL (ref 4.21–5.77)
RBC #/AREA URNS HPF: ABNORMAL /HPF (ref 0–4)
SODIUM SERPL-SCNC: 140 MMOL/L (ref 136–145)
SP GR UR STRIP: 1.02 (ref 1–1.03)
UROBILINOGEN UR STRIP-ACNC: ABNORMAL EU/DL (ref 0–1)
WBC #/AREA URNS HPF: ABNORMAL /HPF (ref 0–5)
WBC OTHER # BLD: 3.9 K/UL (ref 3.5–11.3)

## 2024-08-23 PROCEDURE — 6370000000 HC RX 637 (ALT 250 FOR IP): Performed by: INTERNAL MEDICINE

## 2024-08-23 PROCEDURE — 74018 RADEX ABDOMEN 1 VIEW: CPT

## 2024-08-23 PROCEDURE — 51798 US URINE CAPACITY MEASURE: CPT

## 2024-08-23 PROCEDURE — 99231 SBSQ HOSP IP/OBS SF/LOW 25: CPT | Performed by: INTERNAL MEDICINE

## 2024-08-23 PROCEDURE — 82947 ASSAY GLUCOSE BLOOD QUANT: CPT

## 2024-08-23 PROCEDURE — 99232 SBSQ HOSP IP/OBS MODERATE 35: CPT | Performed by: PSYCHIATRY & NEUROLOGY

## 2024-08-23 PROCEDURE — C9254 INJECTION, LACOSAMIDE: HCPCS

## 2024-08-23 PROCEDURE — 36415 COLL VENOUS BLD VENIPUNCTURE: CPT

## 2024-08-23 PROCEDURE — 2580000003 HC RX 258

## 2024-08-23 PROCEDURE — 2060000000 HC ICU INTERMEDIATE R&B

## 2024-08-23 PROCEDURE — 2500000003 HC RX 250 WO HCPCS

## 2024-08-23 PROCEDURE — 99223 1ST HOSP IP/OBS HIGH 75: CPT | Performed by: INTERNAL MEDICINE

## 2024-08-23 PROCEDURE — 6370000000 HC RX 637 (ALT 250 FOR IP)

## 2024-08-23 PROCEDURE — 93005 ELECTROCARDIOGRAM TRACING: CPT

## 2024-08-23 PROCEDURE — 99222 1ST HOSP IP/OBS MODERATE 55: CPT | Performed by: PSYCHIATRY & NEUROLOGY

## 2024-08-23 PROCEDURE — 80048 BASIC METABOLIC PNL TOTAL CA: CPT

## 2024-08-23 PROCEDURE — 51701 INSERT BLADDER CATHETER: CPT

## 2024-08-23 PROCEDURE — 6360000002 HC RX W HCPCS

## 2024-08-23 PROCEDURE — 85025 COMPLETE CBC W/AUTO DIFF WBC: CPT

## 2024-08-23 PROCEDURE — 6370000000 HC RX 637 (ALT 250 FOR IP): Performed by: NURSE PRACTITIONER

## 2024-08-23 PROCEDURE — 6370000000 HC RX 637 (ALT 250 FOR IP): Performed by: PSYCHIATRY & NEUROLOGY

## 2024-08-23 PROCEDURE — 87086 URINE CULTURE/COLONY COUNT: CPT

## 2024-08-23 PROCEDURE — 2580000003 HC RX 258: Performed by: NURSE PRACTITIONER

## 2024-08-23 PROCEDURE — 81001 URINALYSIS AUTO W/SCOPE: CPT

## 2024-08-23 PROCEDURE — 6360000002 HC RX W HCPCS: Performed by: NURSE PRACTITIONER

## 2024-08-23 RX ORDER — LUBIPROSTONE 8 UG/1
8 CAPSULE ORAL 2 TIMES DAILY WITH MEALS
Status: COMPLETED | OUTPATIENT
Start: 2024-08-23 | End: 2024-08-25

## 2024-08-23 RX ORDER — AMOXICILLIN 250 MG
2 CAPSULE ORAL 2 TIMES DAILY
Status: DISCONTINUED | OUTPATIENT
Start: 2024-08-23 | End: 2024-08-26

## 2024-08-23 RX ORDER — BISACODYL 10 MG
10 SUPPOSITORY, RECTAL RECTAL DAILY
Status: DISCONTINUED | OUTPATIENT
Start: 2024-08-23 | End: 2024-08-27 | Stop reason: HOSPADM

## 2024-08-23 RX ORDER — POLYETHYLENE GLYCOL 3350 17 G/17G
17 POWDER, FOR SOLUTION ORAL 4 TIMES DAILY
Status: DISCONTINUED | OUTPATIENT
Start: 2024-08-23 | End: 2024-08-26

## 2024-08-23 RX ADMIN — LUBIPROSTONE 8 MCG: 8 CAPSULE, GELATIN COATED ORAL at 17:36

## 2024-08-23 RX ADMIN — CLONIDINE HYDROCHLORIDE 0.1 MG: 0.1 TABLET ORAL at 13:17

## 2024-08-23 RX ADMIN — SODIUM CHLORIDE, PRESERVATIVE FREE 10 ML: 5 INJECTION INTRAVENOUS at 21:10

## 2024-08-23 RX ADMIN — CHLORPROMAZINE HYDROCHLORIDE 200 MG: 100 TABLET, FILM COATED ORAL at 09:14

## 2024-08-23 RX ADMIN — CLONIDINE HYDROCHLORIDE 0.2 MG: 0.1 TABLET ORAL at 20:41

## 2024-08-23 RX ADMIN — POLYETHYLENE GLYCOL 3350 17 G: 17 POWDER, FOR SOLUTION ORAL at 09:16

## 2024-08-23 RX ADMIN — LACOSAMIDE 200 MG: 10 INJECTION INTRAVENOUS at 23:03

## 2024-08-23 RX ADMIN — POLYETHYLENE GLYCOL 3350 17 G: 17 POWDER, FOR SOLUTION ORAL at 11:12

## 2024-08-23 RX ADMIN — LUBIPROSTONE 8 MCG: 8 CAPSULE, GELATIN COATED ORAL at 13:09

## 2024-08-23 RX ADMIN — CHLORPROMAZINE HYDROCHLORIDE 50 MG: 100 TABLET, FILM COATED ORAL at 09:16

## 2024-08-23 RX ADMIN — CHLORPROMAZINE HYDROCHLORIDE 300 MG: 100 TABLET, FILM COATED ORAL at 20:41

## 2024-08-23 RX ADMIN — FLUOXETINE HYDROCHLORIDE 40 MG: 20 CAPSULE ORAL at 09:15

## 2024-08-23 RX ADMIN — POLYETHYLENE GLYCOL 3350 17 G: 17 POWDER, FOR SOLUTION ORAL at 20:41

## 2024-08-23 RX ADMIN — CLONIDINE HYDROCHLORIDE 0.1 MG: 0.1 TABLET ORAL at 09:15

## 2024-08-23 RX ADMIN — BISACODYL 10 MG: 10 SUPPOSITORY RECTAL at 11:12

## 2024-08-23 RX ADMIN — POLYETHYLENE GLYCOL 3350 17 G: 17 POWDER, FOR SOLUTION ORAL at 17:35

## 2024-08-23 RX ADMIN — ENOXAPARIN SODIUM 40 MG: 100 INJECTION SUBCUTANEOUS at 09:16

## 2024-08-23 RX ADMIN — VALPROATE SODIUM 1250 MG: 100 INJECTION, SOLUTION INTRAVENOUS at 13:07

## 2024-08-23 RX ADMIN — CHLORPROMAZINE HYDROCHLORIDE 100 MG: 100 TABLET, FILM COATED ORAL at 13:10

## 2024-08-23 RX ADMIN — SENNOSIDES AND DOCUSATE SODIUM 2 TABLET: 50; 8.6 TABLET ORAL at 11:11

## 2024-08-23 RX ADMIN — DEXTROSE AND SODIUM CHLORIDE: 5; 450 INJECTION, SOLUTION INTRAVENOUS at 04:52

## 2024-08-23 RX ADMIN — ERYTHROMYCIN LACTOBIONATE 250 MG: 500 INJECTION, POWDER, LYOPHILIZED, FOR SOLUTION INTRAVENOUS at 03:32

## 2024-08-23 RX ADMIN — TROSPIUM CHLORIDE 20 MG: 20 TABLET, FILM COATED ORAL at 17:35

## 2024-08-23 RX ADMIN — SENNOSIDES AND DOCUSATE SODIUM 2 TABLET: 50; 8.6 TABLET ORAL at 20:41

## 2024-08-23 RX ADMIN — TROSPIUM CHLORIDE 20 MG: 20 TABLET, FILM COATED ORAL at 09:15

## 2024-08-23 RX ADMIN — Medication 10 MG: at 22:11

## 2024-08-23 RX ADMIN — VALPROATE SODIUM 1250 MG: 100 INJECTION, SOLUTION INTRAVENOUS at 00:17

## 2024-08-23 RX ADMIN — DEXTROSE AND SODIUM CHLORIDE: 5; 450 INJECTION, SOLUTION INTRAVENOUS at 15:21

## 2024-08-23 RX ADMIN — VALPROATE SODIUM 1250 MG: 100 INJECTION, SOLUTION INTRAVENOUS at 23:59

## 2024-08-23 RX ADMIN — LACOSAMIDE 200 MG: 10 INJECTION INTRAVENOUS at 13:00

## 2024-08-23 ASSESSMENT — PAIN SCALES - WONG BAKER
WONGBAKER_NUMERICALRESPONSE: NO HURT
WONGBAKER_NUMERICALRESPONSE: NO HURT

## 2024-08-23 NOTE — PROGRESS NOTES
[facility]  Internal Medicine Teaching Residency Program  Inpatient Daily Progress Note  ______________________________________________________________________________    Patient: Yuriy Whelan  YOB: 1995   MRN:[id number]    Acct: [id number]     Room: 0145/0145-01  Admit date: 8/16/2024  Today's date: 08/23/24  Number of days in the hospital: 7    SUBJECTIVE   CC: No chief complaint on file.    Pt examined at bedside. Chart & results reviewed.   -VSS, pt is saturating well on room air   -labs reviewed   -no acute events overnight.       ROS:  General ROS: Completed and except as mentioned above were negative   HEENT ROS: Completed and except as mentioned above were negative   Allergy and Immunology ROS:  Completed and except as mentioned above were negative  Hematological and Lymphatic ROS:  Completed and except as mentioned above were negative  Respiratory ROS:  Completed and except as mentioned above were negative  Cardiovascular ROS:  Completed and except as mentioned above were negative  Gastrointestinal ROS: Completed and except as mentioned above were negative  Genito-Urinary ROS:  Completed and except as mentioned above were negative  Musculoskeletal ROS:  Completed and except as mentioned above were negative  Neurological ROS:  Completed and except as mentioned above were negative  Skin & Dermatological ROS:  Completed and except as mentioned above were negative  Psychological ROS:  Completed and except as mentioned above were negative  BRIEF HISTORY     The patient is a 28 y.o. male with PMHx of autism, intellectual disability, epilepsy is transferred from neurology service to internal medicine service for the concern of constipation/abdominal distention.  Patient was initially transferred from [facility] for the concern of generalized tonic-clonic seizure.    Patient has a history of epilepsy, and was maintained on Vimpat 150 Mg twice daily, and

## 2024-08-23 NOTE — CARE COORDINATION
Transitional Planning  Called Legal guardian Pam Whelan, [phone], [facility] left to return call regarding transitional plan.  Patient is from [facility], per previous documentation, she was looking for another place for patient, has concerns regarding care.      PS internal med resident on call for PT/OT orders.

## 2024-08-23 NOTE — PLAN OF CARE
Problem: Discharge Planning  Goal: Discharge to home or other facility with appropriate resources  8/23/2024 1813 by Kody Alejo RN  Outcome: Progressing  Flowsheets (Taken 8/22/2024 2000 by Doreen Nova RN)  Discharge to home or other facility with appropriate resources: Identify barriers to discharge with patient and caregiver  8/23/2024 0605 by Doreen Nova RN  Outcome: Progressing  Flowsheets (Taken 8/22/2024 2000)  Discharge to home or other facility with appropriate resources: Identify barriers to discharge with patient and caregiver     Problem: Neurosensory - Adult  Goal: Achieves stable or improved neurological status  8/23/2024 1813 by Kody Alejo RN  Outcome: Progressing  Flowsheets (Taken 8/23/2024 0800)  Achieves stable or improved neurological status: Assess for and report changes in neurological status  8/23/2024 0605 by Doreen Nova RN  Outcome: Progressing  Flowsheets (Taken 8/22/2024 2000)  Achieves stable or improved neurological status: Assess for and report changes in neurological status  Goal: Absence of seizures  8/23/2024 1813 by Kody Alejo RN  Outcome: Progressing  Flowsheets (Taken 8/23/2024 0800)  Absence of seizures: Monitor for seizure activity.  If seizure occurs, document type and location of movements and any associated apnea  8/23/2024 0605 by Doreen Nova RN  Outcome: Progressing  Flowsheets (Taken 8/22/2024 2000)  Absence of seizures: Monitor for seizure activity.  If seizure occurs, document type and location of movements and any associated apnea  Goal: Remains free of injury related to seizures activity  8/23/2024 1813 by Kody Alejo RN  Outcome: Progressing  Flowsheets (Taken 8/23/2024 0800)  Remains free of injury related to seizure activity: Maintain airway, patient safety  and administer oxygen as ordered  8/23/2024 0605 by Droeen Nova RN  Outcome: Progressing  Flowsheets (Taken 8/22/2024 2000)  Remains free of injury

## 2024-08-23 NOTE — PLAN OF CARE
Problem: Discharge Planning  Goal: Discharge to home or other facility with appropriate resources  8/23/2024 0605 by Doreen Nova RN  Outcome: Progressing  Flowsheets (Taken 8/22/2024 2000)  Discharge to home or other facility with appropriate resources: Identify barriers to discharge with patient and caregiver  8/22/2024 1851 by Phyllis Reyna RN  Outcome: Progressing     Problem: Neurosensory - Adult  Goal: Achieves stable or improved neurological status  8/23/2024 0605 by Doreen Nova RN  Outcome: Progressing  Flowsheets (Taken 8/22/2024 2000)  Achieves stable or improved neurological status: Assess for and report changes in neurological status  8/22/2024 1851 by Phyllis Reyna RN  Outcome: Progressing  Goal: Absence of seizures  8/23/2024 0605 by Doreen Nova RN  Outcome: Progressing  Flowsheets (Taken 8/22/2024 2000)  Absence of seizures: Monitor for seizure activity.  If seizure occurs, document type and location of movements and any associated apnea  8/22/2024 1851 by Phyllis Reyna RN  Outcome: Progressing  Goal: Remains free of injury related to seizures activity  8/23/2024 0605 by Doreen Nova RN  Outcome: Progressing  Flowsheets (Taken 8/22/2024 2000)  Remains free of injury related to seizure activity: Maintain airway, patient safety  and administer oxygen as ordered  8/22/2024 1851 by Phyllis Reyna RN  Outcome: Progressing  Goal: Achieves maximal functionality and self care  8/23/2024 0605 by Doreen Nova RN  Outcome: Progressing  Flowsheets (Taken 8/22/2024 2000)  Achieves maximal functionality and self care: Monitor swallowing and airway patency with patient fatigue and changes in neurological status  8/22/2024 1851 by Phyllis Reyna RN  Outcome: Progressing

## 2024-08-23 NOTE — PROGRESS NOTES
Call received from Urology department. Discuss that pt voided at 1330 and writer had to straight cath patient for urine simple for drug test and UA.  Around 5 pm pt was bladder scan 390 ml of urine in bladder ,straight cath 450 ml of urine output. Urology aware of these events, plan is to place conway back later on the day if pt does not void on it own and will reassess conway situation prior discharge.   Will continue with current plan of care.

## 2024-08-23 NOTE — PROGRESS NOTES
POC Glucose 90 75 - 110 mg/dL   POC Glucose Fingerstick    Collection Time: 08/23/24  3:29 AM   Result Value Ref Range    POC Glucose 96 75 - 110 mg/dL   Basic Metabolic Panel w/ Reflex to MG    Collection Time: 08/23/24  5:20 AM   Result Value Ref Range    Sodium 140 136 - 145 mmol/L    Potassium 4.0 3.7 - 5.3 mmol/L    Chloride 106 98 - 107 mmol/L    CO2 24 20 - 31 mmol/L    Anion Gap 10 9 - 16 mmol/L    Glucose 93 74 - 99 mg/dL    BUN 6 6 - 20 mg/dL    Creatinine 0.7 0.70 - 1.20 mg/dL    Est, Glom Filt Rate >90 >60 mL/min/1.73m2    Calcium 8.9 8.6 - 10.4 mg/dL   CBC with Auto Differential    Collection Time: 08/23/24  5:20 AM   Result Value Ref Range    WBC 3.9 3.5 - 11.3 k/uL    RBC 4.36 4.21 - 5.77 m/uL    Hemoglobin 12.6 (L) 13.0 - 17.0 g/dL    Hematocrit 36.1 (L) 40.7 - 50.3 %    MCV 82.8 82.6 - 102.9 fL    MCH 28.9 25.2 - 33.5 pg    MCHC 34.9 (H) 28.4 - 34.8 g/dL    RDW 14.0 11.8 - 14.4 %    Platelets 158 138 - 453 k/uL    MPV 9.1 8.1 - 13.5 fL    NRBC Automated 0.0 0.0 per 100 WBC    Neutrophils % 65 36 - 65 %    Lymphocytes % 22 (L) 24 - 43 %    Monocytes % 13 (H) 3 - 12 %    Eosinophils % 0 (L) 1 - 4 %    Basophils % 0 0 - 2 %    Immature Granulocytes % 1 (H) 0 %    Neutrophils Absolute 2.54 1.50 - 8.10 k/uL    Lymphocytes Absolute 0.88 (L) 1.10 - 3.70 k/uL    Monocytes Absolute 0.49 0.10 - 1.20 k/uL    Eosinophils Absolute <0.03 0.00 - 0.44 k/uL    Basophils Absolute <0.03 0.00 - 0.20 k/uL    Immature Granulocytes Absolute <0.03 0.00 - 0.30 k/uL   EKG 12 Lead    Collection Time: 08/23/24  6:19 AM   Result Value Ref Range    Ventricular Rate 94 BPM    Atrial Rate 94 BPM    P-R Interval 142 ms    QRS Duration 88 ms    Q-T Interval 378 ms    QTc Calculation (Bazett) 472 ms    P Axis 64 degrees    R Axis 64 degrees    T Axis 50 degrees   POC Glucose Fingerstick    Collection Time: 08/23/24  8:56 AM   Result Value Ref Range    POC Glucose 96 75 - 110 mg/dL         Radiology:    MRI Brain without contrast

## 2024-08-23 NOTE — PROGRESS NOTES
[facility]   Gastroenterology Progress Note    Yuriy Whelan is a 28 y.o. male patient.  Hospitalization Day:7      Chief consult reason:   Colonic ileus/distal obstruction    Subjective:  Patient seen and examined, no acute events overnight  According to staff, no BM overnight  Abd still distended but soft  KUB shows heavy stool burden in colon    VITALS:  BP (!) 139/95   Pulse 95   Temp 97.5 °F (36.4 °C)   Resp 14   Ht 1.702 m (5' 7.01\")   Wt 105 kg (231 lb 7.7 oz)   SpO2 93%   BMI 36.25 kg/m²   TEMPERATURE:  Current - Temp: 97.5 °F (36.4 °C); Max - Temp  Av.2 °F (36.8 °C)  Min: 97.5 °F (36.4 °C)  Max: 98.6 °F (37 °C)    Physical Assessment:  General appearance: Awake  Mental Status: BOOM  Lungs:  clear to auscultation bilaterally, normal effort  Heart:  regular rate and rhythm, no murmur  Abdomen:  soft, nontender, nondistended, normal bowel sounds, no masses, hepatomegaly, splenomegaly  Extremities:  no edema, redness, tenderness in the calves  Skin:  no gross lesions, rashes, induration    Data Review:    Labs and Imaging:     CBC:  Recent Labs     24  0700 24  0609 24  0520   WBC 4.6 3.6 3.9   HGB 11.7* 11.5* 12.6*   MCV 86.7 89.1 82.8   RDW 14.5* 14.2 14.0    150 158       ANEMIA STUDIES:  No results for input(s): \"TIBC\", \"FERRITIN\", \"NJZCRBNQ83\", \"FOLATE\", \"OCCULTBLD\" in the last 72 hours.    Invalid input(s): \"LABIRON\"    BMP:  Recent Labs     24  0700 24  0609 24  0520    138 140   K 3.7 3.8 4.0    105 106   CO2 25 23 24   BUN 16 8 6   CREATININE 0.8 0.7 0.7   GLUCOSE 105* 97 93   CALCIUM 8.6 8.6 8.9       LFTS:  Recent Labs     24  1002   ALKPHOS 94   ALT 43   AST 38   BILITOT 0.7   BILIDIR 0.4*       Amylase/Lipase and Ammonia:  No results for input(s): \"AMYLASE\", \"LIPASE\", \"AMMONIA\" in the last 72 hours.    Acute Hepatitis Panel:  Lab Results   Component Value Date/Time    HEPBSAG NONREACTIVE 2014 07:00 AM

## 2024-08-23 NOTE — CONSULTS
and calm  Affect: Congruent  Thought processes: Difficult to reliably assess due to mutism and inability to verbally participate in interview  Thought content: No reports of self-injurious behavior   No reports of targeted aggression towards others   Does not appear to attend to internal stimuli, no evident delusions  Cognition: Response to name  Concentration: Impaired  Memory: Impaired  Insight & Judgment: Poor to absent    DSM-5 DIAGNOSIS:      Autism  Developmental disorder    Stressors     Severity of stressors is moderate  Source of stressors include:  Other psychosocial and environmental stressors    Plan:      Recommend continuing Thorazine and Prozac at current doses  Recommend holding olanzapine  Guardian and patient's facility [facility] requesting repeat UDS, consider confirmatory testing for phencyclidine if not already completed    Patient does not meet criteria for inpatient psychiatric hospitalization, recommend further psychotropic medication adjustments with outpatient provider provided by [facility]    Thank you very much for allowing us to participate in the care of this patient.      Electronically signed by CHELSEA Morillo CNP on 8/23/24 at 12:17 PM EDT    Please note that this chart was generated using voice recognition Dragon dictation software.  Although every effort was made to ensure the accuracy of this automated transcription, some errors in transcription may have occurred.                                          Psychiatry Attending Attestation     I independently saw and evaluated the patient.  I reviewed the Advance Practice Provider's documentation above.  Any additional comments or changes to the Advance Practice Provider's documentation are stated below otherwise agree with assessment.    Patient is a 28-year-old male with a long history of intentional functioning disability presented for seizure-like activity.  Reviewed medications and agreed to plan to continue Thorazine and Prozac.   Will recommend discontinuing olanzapine.  Discussed with mother at length about her concerns of history has been positive for PCP.  Discussed with her that this is most likely a false positive however she is adamant about getting a confirmatory test.  Discussed with her that she might have to reach out to her primary care physician to rule send in order to [facility] here in [place] to help with MH testing.  Does not require [facility] admission.  Will follow as needed.    Electronically signed by Sebastien Caro MD on 8/23/24 at 12:20 PM EDT

## 2024-08-23 NOTE — PROGRESS NOTES
Comprehensive Nutrition Assessment    Type and Reason for Visit:  Initial, RD Nutrition Re-Screen/LOS    Nutrition Recommendations/Plan:   As medically able, resume diet  Provide meal set up and cut up assistance as needed  If unable to start PO diet, consider supplemental nutrition support, pt is NPO day 5  Monitor diet advancement, intakes, GI status, fluid status.     Malnutrition Assessment:  Malnutrition Status:  Mild malnutrition (08/23/24 1425)    Context:  Acute Illness     Findings of the 6 clinical characteristics of malnutrition:  Energy Intake:  50% or less of estimated energy requirements for 5 or more days  Weight Loss:  Unable to assess     Body Fat Loss:  No significant body fat loss (visual)     Muscle Mass Loss:  No significant muscle mass loss (visual)    Fluid Accumulation:  No significant fluid accumulation     Strength:  Not Performed    Nutrition Assessment:    29 yo M adm [facility]. PMHx includes autism, epilepsy. Pt is non-verbal. Mother and family at bedside. Per GI, KUB shows heavy stool burden. Plan per GI is start CLD after passing BMs. n/o miralax 4x daily, suppository. Noted NPO x 5 days. Previously tolerating a regular diet and eating well PTA per Jeanmarie (works at facility).  No use of ONS, vitamin, mineral supplements PTA. Typical consuming 3 meals per day. Jeanmarie reports pt occasionally needs food cut up. Mom reports reports pt dislikes sandwiches, breads.    Nutrition Related Findings:    Meds/labs reviewed. No edema per chart         Current Nutrition Intake & Therapies:    Average Meal Intake: NPO  Average Supplements Intake: NPO  Diet NPO Exceptions are: Sips of Water with Meds  Additional Calorie Sources:  D5% and 0.45% NaCl at 100 mL/hr = 408 kcal/d    Anthropometric Measures:  Height: 170.2 cm (5' 7.01\")  Ideal Body Weight (IBW): 148 lbs (67 kg)    Current Body Weight: 105 kg (231 lb 7.7 oz), 156.4 % IBW. Weight Source: Bed Scale  Current BMI (kg/m2): 36.2  BMI Categories: Obese

## 2024-08-23 NOTE — PROGRESS NOTES
1120: Frias cath removed, pt tolerated well.      1330 :Pt voided, saturated brief,linen and under pad.Will continue with current plan of care.

## 2024-08-24 ENCOUNTER — APPOINTMENT (OUTPATIENT)
Dept: GENERAL RADIOLOGY | Age: 29
DRG: 101 | End: 2024-08-24
Attending: PSYCHIATRY & NEUROLOGY
Payer: MEDICARE

## 2024-08-24 PROBLEM — G93.40 ENCEPHALOPATHY: Status: ACTIVE | Noted: 2024-08-24

## 2024-08-24 LAB
AMPHET UR QL SCN: NEGATIVE
ANION GAP SERPL CALCULATED.3IONS-SCNC: 9 MMOL/L (ref 9–16)
BARBITURATES UR QL SCN: NEGATIVE
BASOPHILS # BLD: <0.03 K/UL (ref 0–0.2)
BASOPHILS NFR BLD: 0 % (ref 0–2)
BENZODIAZ UR QL: POSITIVE
BUN SERPL-MCNC: 11 MG/DL (ref 6–20)
CALCIUM SERPL-MCNC: 9 MG/DL (ref 8.6–10.4)
CANNABINOIDS UR QL SCN: NEGATIVE
CHLORIDE SERPL-SCNC: 105 MMOL/L (ref 98–107)
CO2 SERPL-SCNC: 23 MMOL/L (ref 20–31)
COCAINE UR QL SCN: NEGATIVE
CREAT SERPL-MCNC: 0.7 MG/DL (ref 0.7–1.2)
EOSINOPHIL # BLD: <0.03 K/UL (ref 0–0.44)
EOSINOPHILS RELATIVE PERCENT: 0 % (ref 1–4)
ERYTHROCYTE [DISTWIDTH] IN BLOOD BY AUTOMATED COUNT: 14 % (ref 11.8–14.4)
FENTANYL UR QL: NEGATIVE
GFR, ESTIMATED: >90 ML/MIN/1.73M2
GLUCOSE BLD-MCNC: 102 MG/DL (ref 75–110)
GLUCOSE BLD-MCNC: 118 MG/DL (ref 75–110)
GLUCOSE BLD-MCNC: 89 MG/DL (ref 75–110)
GLUCOSE BLD-MCNC: 96 MG/DL (ref 75–110)
GLUCOSE BLD-MCNC: 99 MG/DL (ref 75–110)
GLUCOSE SERPL-MCNC: 97 MG/DL (ref 74–99)
HCT VFR BLD AUTO: 37.2 % (ref 40.7–50.3)
HGB BLD-MCNC: 12.1 G/DL (ref 13–17)
IMM GRANULOCYTES # BLD AUTO: <0.03 K/UL (ref 0–0.3)
IMM GRANULOCYTES NFR BLD: 0 %
LYMPHOCYTES NFR BLD: 0.7 K/UL (ref 1.1–3.7)
LYMPHOCYTES RELATIVE PERCENT: 22 % (ref 24–43)
MCH RBC QN AUTO: 28.2 PG (ref 25.2–33.5)
MCHC RBC AUTO-ENTMCNC: 32.5 G/DL (ref 28.4–34.8)
MCV RBC AUTO: 86.7 FL (ref 82.6–102.9)
METHADONE UR QL: NEGATIVE
MICROORGANISM SPEC CULT: NO GROWTH
MONOCYTES NFR BLD: 0.31 K/UL (ref 0.1–1.2)
MONOCYTES NFR BLD: 10 % (ref 3–12)
NEUTROPHILS NFR BLD: 68 % (ref 36–65)
NEUTS SEG NFR BLD: 2.17 K/UL (ref 1.5–8.1)
NRBC BLD-RTO: 0 PER 100 WBC
OPIATES UR QL SCN: NEGATIVE
OXYCODONE UR QL SCN: NEGATIVE
PCP UR QL SCN: POSITIVE
PLATELET # BLD AUTO: 181 K/UL (ref 138–453)
PMV BLD AUTO: 9.8 FL (ref 8.1–13.5)
POTASSIUM SERPL-SCNC: 4.2 MMOL/L (ref 3.7–5.3)
RBC # BLD AUTO: 4.29 M/UL (ref 4.21–5.77)
SERVICE CMNT-IMP: NORMAL
SODIUM SERPL-SCNC: 137 MMOL/L (ref 136–145)
SPECIMEN DESCRIPTION: NORMAL
TEST INFORMATION: ABNORMAL
WBC OTHER # BLD: 3.2 K/UL (ref 3.5–11.3)

## 2024-08-24 PROCEDURE — 6370000000 HC RX 637 (ALT 250 FOR IP): Performed by: INTERNAL MEDICINE

## 2024-08-24 PROCEDURE — 80048 BASIC METABOLIC PNL TOTAL CA: CPT

## 2024-08-24 PROCEDURE — 2580000003 HC RX 258

## 2024-08-24 PROCEDURE — 97162 PT EVAL MOD COMPLEX 30 MIN: CPT

## 2024-08-24 PROCEDURE — 6370000000 HC RX 637 (ALT 250 FOR IP): Performed by: NURSE PRACTITIONER

## 2024-08-24 PROCEDURE — 6370000000 HC RX 637 (ALT 250 FOR IP)

## 2024-08-24 PROCEDURE — 97530 THERAPEUTIC ACTIVITIES: CPT

## 2024-08-24 PROCEDURE — 74018 RADEX ABDOMEN 1 VIEW: CPT

## 2024-08-24 PROCEDURE — 99232 SBSQ HOSP IP/OBS MODERATE 35: CPT | Performed by: INTERNAL MEDICINE

## 2024-08-24 PROCEDURE — 6360000002 HC RX W HCPCS

## 2024-08-24 PROCEDURE — 2060000000 HC ICU INTERMEDIATE R&B

## 2024-08-24 PROCEDURE — 85025 COMPLETE CBC W/AUTO DIFF WBC: CPT

## 2024-08-24 PROCEDURE — 99221 1ST HOSP IP/OBS SF/LOW 40: CPT | Performed by: SURGERY

## 2024-08-24 PROCEDURE — 80307 DRUG TEST PRSMV CHEM ANLYZR: CPT

## 2024-08-24 PROCEDURE — 82947 ASSAY GLUCOSE BLOOD QUANT: CPT

## 2024-08-24 PROCEDURE — 2500000003 HC RX 250 WO HCPCS

## 2024-08-24 PROCEDURE — 36415 COLL VENOUS BLD VENIPUNCTURE: CPT

## 2024-08-24 PROCEDURE — C9254 INJECTION, LACOSAMIDE: HCPCS

## 2024-08-24 PROCEDURE — 97166 OT EVAL MOD COMPLEX 45 MIN: CPT

## 2024-08-24 PROCEDURE — 97110 THERAPEUTIC EXERCISES: CPT

## 2024-08-24 PROCEDURE — 6370000000 HC RX 637 (ALT 250 FOR IP): Performed by: PSYCHIATRY & NEUROLOGY

## 2024-08-24 RX ORDER — BISACODYL 5 MG/1
10 TABLET, DELAYED RELEASE ORAL ONCE
Status: COMPLETED | OUTPATIENT
Start: 2024-08-24 | End: 2024-08-24

## 2024-08-24 RX ADMIN — CLONIDINE HYDROCHLORIDE 0.1 MG: 0.1 TABLET ORAL at 11:30

## 2024-08-24 RX ADMIN — CHLORPROMAZINE HYDROCHLORIDE 300 MG: 100 TABLET, FILM COATED ORAL at 21:37

## 2024-08-24 RX ADMIN — LACOSAMIDE 200 MG: 10 INJECTION INTRAVENOUS at 11:30

## 2024-08-24 RX ADMIN — LUBIPROSTONE 8 MCG: 8 CAPSULE, GELATIN COATED ORAL at 09:31

## 2024-08-24 RX ADMIN — TROSPIUM CHLORIDE 20 MG: 20 TABLET, FILM COATED ORAL at 18:05

## 2024-08-24 RX ADMIN — CLONIDINE HYDROCHLORIDE 0.2 MG: 0.1 TABLET ORAL at 21:37

## 2024-08-24 RX ADMIN — POLYETHYLENE GLYCOL 3350 17 G: 17 POWDER, FOR SOLUTION ORAL at 18:05

## 2024-08-24 RX ADMIN — SENNOSIDES AND DOCUSATE SODIUM 2 TABLET: 50; 8.6 TABLET ORAL at 09:36

## 2024-08-24 RX ADMIN — BISACODYL 10 MG: 10 SUPPOSITORY RECTAL at 09:33

## 2024-08-24 RX ADMIN — SODIUM CHLORIDE, PRESERVATIVE FREE 10 ML: 5 INJECTION INTRAVENOUS at 21:37

## 2024-08-24 RX ADMIN — CHLORPROMAZINE HYDROCHLORIDE 200 MG: 100 TABLET, FILM COATED ORAL at 09:36

## 2024-08-24 RX ADMIN — CHLORPROMAZINE HYDROCHLORIDE 50 MG: 100 TABLET, FILM COATED ORAL at 09:31

## 2024-08-24 RX ADMIN — SENNOSIDES AND DOCUSATE SODIUM 2 TABLET: 50; 8.6 TABLET ORAL at 21:37

## 2024-08-24 RX ADMIN — DEXTROSE AND SODIUM CHLORIDE: 5; 450 INJECTION, SOLUTION INTRAVENOUS at 09:51

## 2024-08-24 RX ADMIN — BISACODYL 10 MG: 5 TABLET, COATED ORAL at 12:43

## 2024-08-24 RX ADMIN — FLUOXETINE HYDROCHLORIDE 40 MG: 20 CAPSULE ORAL at 09:31

## 2024-08-24 RX ADMIN — SODIUM CHLORIDE, PRESERVATIVE FREE 10 ML: 5 INJECTION INTRAVENOUS at 09:36

## 2024-08-24 RX ADMIN — CHLORPROMAZINE HYDROCHLORIDE 100 MG: 100 TABLET, FILM COATED ORAL at 11:30

## 2024-08-24 RX ADMIN — LACOSAMIDE 200 MG: 10 INJECTION INTRAVENOUS at 23:49

## 2024-08-24 RX ADMIN — VALPROATE SODIUM 1250 MG: 100 INJECTION, SOLUTION INTRAVENOUS at 11:42

## 2024-08-24 RX ADMIN — TROSPIUM CHLORIDE 20 MG: 20 TABLET, FILM COATED ORAL at 09:33

## 2024-08-24 RX ADMIN — LUBIPROSTONE 8 MCG: 8 CAPSULE, GELATIN COATED ORAL at 18:04

## 2024-08-24 RX ADMIN — POLYETHYLENE GLYCOL 3350 17 G: 17 POWDER, FOR SOLUTION ORAL at 09:33

## 2024-08-24 RX ADMIN — POLYETHYLENE GLYCOL 3350 17 G: 17 POWDER, FOR SOLUTION ORAL at 12:43

## 2024-08-24 RX ADMIN — CLONIDINE HYDROCHLORIDE 0.1 MG: 0.1 TABLET ORAL at 09:31

## 2024-08-24 RX ADMIN — ENOXAPARIN SODIUM 40 MG: 100 INJECTION SUBCUTANEOUS at 09:33

## 2024-08-24 RX ADMIN — POLYETHYLENE GLYCOL 3350 17 G: 17 POWDER, FOR SOLUTION ORAL at 21:38

## 2024-08-24 ASSESSMENT — PAIN SCALES - GENERAL
PAINLEVEL_OUTOF10: 0
PAINLEVEL_OUTOF10: 0

## 2024-08-24 NOTE — PROGRESS NOTES
[facility]  Internal Medicine Teaching Residency Program  Inpatient Daily Progress Note  ______________________________________________________________________________    Patient: Yuriy Whelan  YOB: 1995   MRN:[id number]    Acct: [id number]     Room: 0145/0145-01  Admit date: 2024  Today's date: 24  Number of days in the hospital: 8    SUBJECTIVE   CC: No chief complaint on file.    Pt examined at bedside. Chart & results reviewed.   -VSS, pt is saturating well on room air   -labs reviewed   -no acute events overnight.   -Nonverbal at baseline.  Still no bowel movements except some mucoid excretion.      ROS:  ROS unable to obtain due to patient being nonverbal.  BRIEF HISTORY     The patient is a 28 y.o. male with PMHx of autism, intellectual disability, epilepsy is transferred from neurology service to internal medicine service for the concern of constipation/abdominal distention.  Patient was initially transferred from [facility] for the concern of generalized tonic-clonic seizure.    Patient has a history of epilepsy, and was maintained on Vimpat 150 Mg twice daily, and per caregiver patient did not have any seizure-like activity in the past 3 to 4 years.  Per patient's mother his last episode was 10 years back.  UDS came back positive for PCP.  Patient was started on ceftriaxone for the concern of UTI.  And TMU for 3 days did not show any seizure activity.  Patient is currently on VPA 1250 mg twice daily      Patient transferred to medicine service for large bowel obstruction. GI on board.     Review of Systems   Unable to review patient is nonverbal at baseline    OBJECTIVE     Vital Signs:  BP (!) 140/87   Pulse (!) 103   Temp 98.4 °F (36.9 °C) (Oral)   Resp 16   Ht 1.702 m (5' 7.01\")   Wt 105 kg (231 lb 7.7 oz)   SpO2 94%   BMI 36.25 kg/m²     Temp (24hrs), Av.5 °F (36.9 °C), Min:97.9 °F (36.6 °C), Max:99.4 °F (37.4  difficult to exclude.  Consider further evaluation with colonoscopy.     XR ABDOMEN (KUB) (SINGLE AP VIEW)    Result Date: 8/21/2024  Moderate to large amount of stool most pronounced in the right colon.     XR ABDOMEN (KUB) (SINGLE AP VIEW)    Result Date: 8/20/2024  Nonobstructive bowel gas pattern. Prominent amount of formed stool and correlation with symptomatology for constipation is needed.     XR CHEST PORTABLE    Result Date: 8/19/2024  Normal examination.     XR ABDOMEN (KUB) (SINGLE AP VIEW)    Result Date: 8/19/2024  1. No significant change to diffuse gaseous prominence of large bowel with cecum measuring up to approximately 12 cm.  This may relate to ileus or distal obstruction.  Continued follow-up is recommended. 2. Somewhat prominent stool within ascending and transverse colon, similar to prior.     MRI BRAIN WO CONTRAST    Result Date: 8/18/2024  No acute intracranial abnormality.  No ictal focus identified.     XR ABDOMEN (KUB) (SINGLE AP VIEW)    Result Date: 8/18/2024  Markedly distended colon with gas and stool. The cecum measures up to 13 cm in diameter. No abnormal air-fluid levels. Findings are concerning for colonic ileus or distal obstruction.     CT Head WO Contrast    Result Date: 8/16/2024  1. No acute intracranial abnormality. 2. Similar probable right frontotemporal subgaleal hematoma. 3. Similar nonacute appearing paranasal sinus disease.       ASSESSMENT & PLAN     Principal Problem:    Altered mental status  Active Problems:    Seizure (HCC)    Abdominal distention    Ileus (HCC)    Colon distention    Constipation    Abnormal EEG  Resolved Problems:    * No resolved hospital problems. *    Seizure disorder:  -On LTM  -On Vimpat 200 Mg twice daily  -On VPA 1250 Mg twice daily  Neurology on board.  Appreciate recommendations.      Constipation/abdominal distention:  -CT abdomen and pelvis yesterday was large bowel obstruction, recommended further evaluation with colonoscopy  -Patient

## 2024-08-24 NOTE — CONSULTS
General Surgery  Consult    PATIENT NAME: Yuriy Whelan  AGE: 28 y.o.  MEDICAL RECORD NO. [id number]  DATE: 8/24/2024  SURGEON: Dr. Mckeon  PRIMARY CARE PHYSICIAN: Keshawn Carranza MD    Patient evaluated at the request of  Dr. Wylie  Reason for evaluation: \"refractory constipation with distended and tense abdomen\"    Patient information was obtained from caregiver / friend and past medical records.  History/Exam limitations: communication barrier non-verbal.    IMPRESSION:     Patient Active Problem List   Diagnosis   • MR (mental retardation)   • Rage   • Spells   • Seizures (HCC)   • Foot swelling   • Finger pain   • Sore throat   • Urinary incontinence without sensory awareness   • Altered mental status   • Seizure (HCC)   • Abdominal distention   • Ileus (HCC)   • Colon distention   • Constipation   • Abnormal EEG     Constipation  Abdominal distention    PLAN:   History, physical exam findings, and workup were discussed with the attending surgeon.  No acute surgical intervention is indicated at this time. No pneumoperitoneum on XR and no peritoneal signs on physical exam.  Cont Golytley per GI  If unable to tolerate PO intake, recommend Milk & Molasses enema  Cont Glycolax and Pericolace  Monitor abdominal exam      HISTORY:   History of Chief Complaint:    Yuriy Whelan is a 28 y.o. male who was admitted with breakthrough seizures on 8/17. He remains hospitalized due to abdominal distention and constipation. Per primary, pt has tried daily bowel regimen with glycolax and pericolace, along with dulcolax suppository. Pt unable to provide history. He is nonverbal and stays at an assisted living facility. His caretaker is with him and reports pt does have constipation at baseline and usually has to manually disimpact himself. KUB obtained showing mild stool burden and gaseous distention of the colon, no signs of obstruction.    Past Medical History   has a past medical history of Asthma, Autism disorder,    CO2 23  --  24  --  23   BUN 8  --  6  --  11   CREATININE 0.7  --  0.7  --  0.7   CALCIUM 8.6  --  8.9  --  9.0   AST  --  38  --   --   --    ALT  --  43  --   --   --    BILITOT  --  0.7  --   --   --    BILIDIR  --  0.4*  --   --   --    NITRU  --   --   --  NEGATIVE  --    COLORU  --   --   --  Dark Yellow*  --    BACTERIA  --   --   --  None  --      Recent Labs     08/22/24  1002   ALKPHOS 94   ALT 43   AST 38   BILITOT 0.7   BILIDIR 0.4*         RADIOLOGY:     XR ABDOMEN (KUB) (SINGLE AP VIEW)    Result Date: 8/24/2024  1. No abnormally dilated small bowel loops. 2. Gaseous distention of the colon.  Distal gas in the region of the sigmoid colon.  Mild stool burden.     XR ABDOMEN (KUB) (SINGLE AP VIEW)    Result Date: 8/23/2024  Constipated colon.        Thank you for the interesting evaluation. Further recommendations to follow.    Beckie Rodriguez DO  8/24/2024, 1:49 PM    Attestation signed by Madhu Mckeon MD    I personally evaluated the patient and directed the medical decision making with Resident/SHERLY after the physical/radiologic exam and laboratory values were reviewed and confirmed. Abdomen with bbenign exam.  Plain films/labs unremarkable.  GI rx with go-lytely.  Will sign off.     Madhu Mckeon MD

## 2024-08-24 NOTE — PROGRESS NOTES
Facility/Department: [address] [facility]   Occupational Therapy Initial Evaluation    Patient Name: Yuriy Whelan        MRN: [id number]    : 1995    Date of Service: 2024    Obtained from medical chart:   \" The patient is a 28 y.o. male with PMHx of autism, intellectual disability, epilepsy is transferred from neurology service to internal medicine service for the concern of constipation/abdominal distention.  Patient was initially transferred from [facility] for the concern of generalized tonic-clonic seizure.    Patient has a history of epilepsy, and was maintained on Vimpat 150 Mg twice daily, and per caregiver patient did not have any seizure-like activity in the past 3 to 4 years.  Per patient's mother his last episode was 10 years back.  UDS came back positive for PCP.  Patient was started on ceftriaxone for the concern of UTI. \"    Discharge Recommendations  Discharge Recommendations: Patient would benefit from continued therapy after discharge     Assessment  Performance deficits / Impairments: Decreased functional mobility ;Decreased ADL status;Decreased cognition;Decreased endurance;Decreased balance;Decreased safe awareness;Decreased strength;Decreased posture  Assessment: Pt limited this date d/t decreased activity tolerance, requiring increased time and encouragement to participate in functional tasks. Pt required Mod A x2 to complete bed mobility and Min A x2 with handheld assistance to complete transfer prior to returning to supine. Pt able to reach up and grasp animals from therapist and follow ~75% of 1 step commands. Patient would benefit from continued acute OT services to address functional deficits through skilled intervention to promote independence and safety with ADL/IADLs and functional transfers/mobility for safe return to prior living environment and level of function.  Prognosis: Good  Decision Making: Medium Complexity  REQUIRES OT FOLLOW-UP: Yes  Activity Tolerance  Activity

## 2024-08-24 NOTE — PLAN OF CARE
Problem: Discharge Planning  Goal: Discharge to home or other facility with appropriate resources  8/24/2024 1722 by Skylar Elizabeth RN  Outcome: Progressing  8/24/2024 0607 by Doreen Nova RN  Outcome: Progressing  Flowsheets (Taken 8/23/2024 2000)  Discharge to home or other facility with appropriate resources: Identify barriers to discharge with patient and caregiver     Problem: Neurosensory - Adult  Goal: Achieves stable or improved neurological status  8/24/2024 1722 by Skylar Elizabeth RN  Outcome: Progressing  8/24/2024 0607 by Doreen Nova RN  Outcome: Progressing  Flowsheets (Taken 8/23/2024 2000)  Achieves stable or improved neurological status: Assess for and report changes in neurological status  Goal: Absence of seizures  8/24/2024 1722 by Skylar Elizabeth RN  Outcome: Progressing  8/24/2024 0607 by Doreen Nova RN  Outcome: Progressing  Flowsheets (Taken 8/23/2024 2000)  Absence of seizures: Monitor for seizure activity.  If seizure occurs, document type and location of movements and any associated apnea  Goal: Remains free of injury related to seizures activity  8/24/2024 1722 by Skylar Elizabeth RN  Outcome: Progressing  8/24/2024 0607 by Doreen Nova RN  Outcome: Progressing  Flowsheets (Taken 8/23/2024 2000)  Remains free of injury related to seizure activity: Maintain airway, patient safety  and administer oxygen as ordered  Goal: Achieves maximal functionality and self care  8/24/2024 1722 by Skylar Elizabeth RN  Outcome: Progressing  8/24/2024 0607 by Doreen Nova RN  Outcome: Progressing  Flowsheets (Taken 8/23/2024 2000)  Achieves maximal functionality and self care: Monitor swallowing and airway patency with patient fatigue and changes in neurological status     Problem: Genitourinary - Adult  Goal: Absence of urinary retention  8/24/2024 1722 by Skylar Elizabeth RN  Outcome: Progressing  8/24/2024 0607 by Doreen Nova RN  Outcome: Progressing  Flowsheets (Taken

## 2024-08-24 NOTE — PLAN OF CARE
Problem: Discharge Planning  Goal: Discharge to home or other facility with appropriate resources  8/24/2024 0607 by Doreen Nova RN  Outcome: Progressing  Flowsheets (Taken 8/23/2024 2000)  Discharge to home or other facility with appropriate resources: Identify barriers to discharge with patient and caregiver  8/23/2024 1813 by Kody Alejo RN  Outcome: Progressing  Flowsheets (Taken 8/22/2024 2000 by Doreen Nova RN)  Discharge to home or other facility with appropriate resources: Identify barriers to discharge with patient and caregiver     Problem: Neurosensory - Adult  Goal: Achieves stable or improved neurological status  8/24/2024 0607 by Doreen Nova RN  Outcome: Progressing  Flowsheets (Taken 8/23/2024 2000)  Achieves stable or improved neurological status: Assess for and report changes in neurological status  8/23/2024 1813 by Kody Alejo RN  Outcome: Progressing  Flowsheets (Taken 8/23/2024 0800)  Achieves stable or improved neurological status: Assess for and report changes in neurological status  Goal: Absence of seizures  8/24/2024 0607 by Doreen Nova RN  Outcome: Progressing  Flowsheets (Taken 8/23/2024 2000)  Absence of seizures: Monitor for seizure activity.  If seizure occurs, document type and location of movements and any associated apnea  8/23/2024 1813 by Kody Alejo RN  Outcome: Progressing  Flowsheets (Taken 8/23/2024 0800)  Absence of seizures: Monitor for seizure activity.  If seizure occurs, document type and location of movements and any associated apnea  Goal: Remains free of injury related to seizures activity  8/24/2024 0607 by Doreen Nova RN  Outcome: Progressing  Flowsheets (Taken 8/23/2024 2000)  Remains free of injury related to seizure activity: Maintain airway, patient safety  and administer oxygen as ordered  8/23/2024 1813 by Kody Alejo RN  Outcome: Progressing  Flowsheets (Taken 8/23/2024 0800)  Remains free of injury

## 2024-08-24 NOTE — PLAN OF CARE
Patient failed void trial overnight, requiring replacement of Frias catheter.  He will need to maintain his catheter upon discharge for least 1 week prior to presenting to the urology clinic for catheter removal and void trial.  No further intervention from urology standpoint while inpatient.    Thank you for letting us take care of this patient.  Please feel free to reach out with any further questions or concerns.    Mary West DO, PGY-5  Urology Resident

## 2024-08-24 NOTE — PROGRESS NOTES
[facility]   Gastroenterology Progress Note    Yuriy Whelan is a 28 y.o. male patient.  Hospitalization Day:8      Chief consult reason:   Colonic ileus/distal obstruction    Subjective:  Patient seen and examined, no acute events overnight  According to staff, only smears overnight  Pt slightly agitated-mom not present yet. Sitter  at bedside  Abd still distended but soft  KUB shows heavy stool burden in colon    VITALS:  BP (!) 140/87   Pulse (!) 103   Temp 98.4 °F (36.9 °C) (Oral)   Resp 16   Ht 1.702 m (5' 7.01\")   Wt 105 kg (231 lb 7.7 oz)   SpO2 94%   BMI 36.25 kg/m²   TEMPERATURE:  Current - Temp: 98.4 °F (36.9 °C); Max - Temp  Av.5 °F (36.9 °C)  Min: 97.9 °F (36.6 °C)  Max: 99.4 °F (37.4 °C)    Physical Assessment:  General appearance: Awake  Mental Status: BOOM  Lungs:  clear to auscultation bilaterally, normal effort  Heart:  regular rate and rhythm, no murmur  Abdomen:  soft, nontender, nondistended, normal bowel sounds, no masses, hepatomegaly, splenomegaly  Extremities:  no edema, redness, tenderness in the calves  Skin:  no gross lesions, rashes, induration    Data Review:    Labs and Imaging:     CBC:  Recent Labs     24  0609 24  0520 24  0504   WBC 3.6 3.9 3.2*   HGB 11.5* 12.6* 12.1*   MCV 89.1 82.8 86.7   RDW 14.2 14.0 14.0    158 181       ANEMIA STUDIES:  No results for input(s): \"TIBC\", \"FERRITIN\", \"[id number]\", \"FOLATE\", \"OCCULTBLD\" in the last 72 hours.    Invalid input(s): \"LABIRON\"    BMP:  Recent Labs     24  0609 24  0520 24  0504    140 137   K 3.8 4.0 4.2    106 105   CO2  23   BUN 8 6 11   CREATININE 0.7 0.7 0.7   GLUCOSE 97 93 97   CALCIUM 8.6 8.9 9.0       LFTS:  Recent Labs     24  1002   ALKPHOS 94   ALT 43   AST 38   BILITOT 0.7   BILIDIR 0.4*       Amylase/Lipase and Ammonia:  No results for input(s): \"AMYLASE\", \"LIPASE\", \"AMMONIA\" in the last 72 hours.    Acute Hepatitis Panel:  Lab

## 2024-08-24 NOTE — PROGRESS NOTES
Physical Therapy  Facility/Department: [address] STEPDOWN  Physical Therapy Initial Assessment    Name: Yuriy Whelan  : 1995  MRN: [id number]  Date of Service: 2024    28-year-old male with past medical history of autism, intellectual disability, epilepsy lives at a group home transferred from [facility] with concern of 1 episode of generalized tonic-clonic seizure yesterday.  Patient had 1 episode of seizure yesterday and was taken to [facility].  He was taking Vimpat 150 twice daily group home, which was increased to 200 Mg twice daily in the ED and was discharged back to the group home from the ED. after the discharge from the hospital, patient was not acting like himself.  He was not following commands, not to using the restroom by himself and was not able to eat by himself which is quite new for the patient as he used to do all those activities by himself before the seizure episode.  He returned back to emergency [facility] and was transferred to .     Discharge Recommendations:  Further therapy recommended at discharge.      Past Medical History:  has a past medical history of Asthma, Autism disorder, Bipolar disorder ([facility]), Collar bone fracture, Constipation, Depressive disorder, GERD (gastroesophageal reflux disease), Insomnia, Non-verbal learning disorder, Seizures ([facility]), and Urinary frequency.  Past Surgical History:  has no past surgical history on file.    Assessment  Pt alert, occasionally making eye contact with PT; reached for \"flora\" stuffed toy to demonstrate UE strength/ROM (did not respond to verbal and visual requests to do so); allowed hip/knee flexion x 1 ea LE before resisting movement with 5/5 strength; several attempts to move pt to EOB and have him sit up/dangle with progression to gait, pt resisted movement of legs to the EOB and started making \"grunting\" noises which care giver warned were usually a precursor to head butting and possible biting situations--aborted

## 2024-08-25 LAB
ANION GAP SERPL CALCULATED.3IONS-SCNC: 8 MMOL/L (ref 9–16)
BASOPHILS # BLD: <0.03 K/UL (ref 0–0.2)
BASOPHILS NFR BLD: 0 % (ref 0–2)
BUN SERPL-MCNC: 10 MG/DL (ref 6–20)
CALCIUM SERPL-MCNC: 8.9 MG/DL (ref 8.6–10.4)
CHLORIDE SERPL-SCNC: 106 MMOL/L (ref 98–107)
CO2 SERPL-SCNC: 24 MMOL/L (ref 20–31)
CREAT SERPL-MCNC: 0.7 MG/DL (ref 0.7–1.2)
EKG ATRIAL RATE: 94 BPM
EKG P AXIS: 64 DEGREES
EKG P-R INTERVAL: 142 MS
EKG Q-T INTERVAL: 378 MS
EKG QRS DURATION: 88 MS
EKG QTC CALCULATION (BAZETT): 472 MS
EKG R AXIS: 64 DEGREES
EKG T AXIS: 50 DEGREES
EKG VENTRICULAR RATE: 94 BPM
EOSINOPHIL # BLD: <0.03 K/UL (ref 0–0.44)
EOSINOPHILS RELATIVE PERCENT: 0 % (ref 1–4)
ERYTHROCYTE [DISTWIDTH] IN BLOOD BY AUTOMATED COUNT: 14 % (ref 11.8–14.4)
GFR, ESTIMATED: >90 ML/MIN/1.73M2
GLUCOSE BLD-MCNC: 96 MG/DL (ref 75–110)
GLUCOSE BLD-MCNC: 98 MG/DL (ref 75–110)
GLUCOSE BLD-MCNC: 99 MG/DL (ref 75–110)
GLUCOSE SERPL-MCNC: 102 MG/DL (ref 74–99)
HCT VFR BLD AUTO: 36.6 % (ref 40.7–50.3)
HGB BLD-MCNC: 12.2 G/DL (ref 13–17)
IMM GRANULOCYTES # BLD AUTO: <0.03 K/UL (ref 0–0.3)
IMM GRANULOCYTES NFR BLD: 0 %
LYMPHOCYTES NFR BLD: 0.7 K/UL (ref 1.1–3.7)
LYMPHOCYTES RELATIVE PERCENT: 23 % (ref 24–43)
MCH RBC QN AUTO: 28.4 PG (ref 25.2–33.5)
MCHC RBC AUTO-ENTMCNC: 33.3 G/DL (ref 28.4–34.8)
MCV RBC AUTO: 85.3 FL (ref 82.6–102.9)
MONOCYTES NFR BLD: 0.38 K/UL (ref 0.1–1.2)
MONOCYTES NFR BLD: 13 % (ref 3–12)
NEUTROPHILS NFR BLD: 64 % (ref 36–65)
NEUTS SEG NFR BLD: 1.93 K/UL (ref 1.5–8.1)
NRBC BLD-RTO: 0 PER 100 WBC
PLATELET # BLD AUTO: 173 K/UL (ref 138–453)
PMV BLD AUTO: 9.4 FL (ref 8.1–13.5)
POTASSIUM SERPL-SCNC: 4.1 MMOL/L (ref 3.7–5.3)
RBC # BLD AUTO: 4.29 M/UL (ref 4.21–5.77)
SODIUM SERPL-SCNC: 138 MMOL/L (ref 136–145)
VALPROIC ACID % FREE: 33 % (ref 5–18)
VALPROIC ACID TOTAL: 67 UG/ML (ref 50–125)
VALPROIC ACID, FREE: 22 UG/ML (ref 7–23)
WBC OTHER # BLD: 3 K/UL (ref 3.5–11.3)

## 2024-08-25 PROCEDURE — 80048 BASIC METABOLIC PNL TOTAL CA: CPT

## 2024-08-25 PROCEDURE — 6370000000 HC RX 637 (ALT 250 FOR IP): Performed by: PSYCHIATRY & NEUROLOGY

## 2024-08-25 PROCEDURE — 2580000003 HC RX 258

## 2024-08-25 PROCEDURE — 6370000000 HC RX 637 (ALT 250 FOR IP): Performed by: NURSE PRACTITIONER

## 2024-08-25 PROCEDURE — C9254 INJECTION, LACOSAMIDE: HCPCS

## 2024-08-25 PROCEDURE — 6360000002 HC RX W HCPCS

## 2024-08-25 PROCEDURE — 85025 COMPLETE CBC W/AUTO DIFF WBC: CPT

## 2024-08-25 PROCEDURE — 6370000000 HC RX 637 (ALT 250 FOR IP)

## 2024-08-25 PROCEDURE — 36415 COLL VENOUS BLD VENIPUNCTURE: CPT

## 2024-08-25 PROCEDURE — 99232 SBSQ HOSP IP/OBS MODERATE 35: CPT | Performed by: INTERNAL MEDICINE

## 2024-08-25 PROCEDURE — 6370000000 HC RX 637 (ALT 250 FOR IP): Performed by: INTERNAL MEDICINE

## 2024-08-25 PROCEDURE — 2500000003 HC RX 250 WO HCPCS

## 2024-08-25 PROCEDURE — 82947 ASSAY GLUCOSE BLOOD QUANT: CPT

## 2024-08-25 PROCEDURE — 2060000000 HC ICU INTERMEDIATE R&B

## 2024-08-25 RX ORDER — POLYETHYLENE GLYCOL 3350 17 G/17G
238 POWDER, FOR SOLUTION ORAL ONCE
Status: COMPLETED | OUTPATIENT
Start: 2024-08-25 | End: 2024-08-25

## 2024-08-25 RX ADMIN — FLUOXETINE HYDROCHLORIDE 40 MG: 20 CAPSULE ORAL at 10:13

## 2024-08-25 RX ADMIN — CLONIDINE HYDROCHLORIDE 0.1 MG: 0.1 TABLET ORAL at 10:14

## 2024-08-25 RX ADMIN — CLONIDINE HYDROCHLORIDE 0.1 MG: 0.1 TABLET ORAL at 12:13

## 2024-08-25 RX ADMIN — LUBIPROSTONE 8 MCG: 8 CAPSULE, GELATIN COATED ORAL at 10:13

## 2024-08-25 RX ADMIN — TROSPIUM CHLORIDE 20 MG: 20 TABLET, FILM COATED ORAL at 16:37

## 2024-08-25 RX ADMIN — VALPROATE SODIUM 1250 MG: 100 INJECTION, SOLUTION INTRAVENOUS at 00:12

## 2024-08-25 RX ADMIN — BISACODYL 10 MG: 10 SUPPOSITORY RECTAL at 10:13

## 2024-08-25 RX ADMIN — CHLORPROMAZINE HYDROCHLORIDE 50 MG: 100 TABLET, FILM COATED ORAL at 10:14

## 2024-08-25 RX ADMIN — CLONIDINE HYDROCHLORIDE 0.2 MG: 0.1 TABLET ORAL at 21:35

## 2024-08-25 RX ADMIN — CHLORPROMAZINE HYDROCHLORIDE 100 MG: 100 TABLET, FILM COATED ORAL at 12:15

## 2024-08-25 RX ADMIN — LACOSAMIDE 200 MG: 10 INJECTION INTRAVENOUS at 12:13

## 2024-08-25 RX ADMIN — SENNOSIDES AND DOCUSATE SODIUM 2 TABLET: 50; 8.6 TABLET ORAL at 21:36

## 2024-08-25 RX ADMIN — DEXTROSE AND SODIUM CHLORIDE: 5; 450 INJECTION, SOLUTION INTRAVENOUS at 10:18

## 2024-08-25 RX ADMIN — SENNOSIDES AND DOCUSATE SODIUM 2 TABLET: 50; 8.6 TABLET ORAL at 10:13

## 2024-08-25 RX ADMIN — LUBIPROSTONE 8 MCG: 8 CAPSULE, GELATIN COATED ORAL at 16:37

## 2024-08-25 RX ADMIN — DEXTROSE AND SODIUM CHLORIDE: 5; 450 INJECTION, SOLUTION INTRAVENOUS at 00:13

## 2024-08-25 RX ADMIN — CHLORPROMAZINE HYDROCHLORIDE 300 MG: 100 TABLET, FILM COATED ORAL at 21:35

## 2024-08-25 RX ADMIN — ENOXAPARIN SODIUM 40 MG: 100 INJECTION SUBCUTANEOUS at 10:12

## 2024-08-25 RX ADMIN — VALPROATE SODIUM 1250 MG: 100 INJECTION, SOLUTION INTRAVENOUS at 12:28

## 2024-08-25 RX ADMIN — POLYETHYLENE GLYCOL 3350 17 G: 17 POWDER, FOR SOLUTION ORAL at 16:37

## 2024-08-25 RX ADMIN — SODIUM CHLORIDE, PRESERVATIVE FREE 10 ML: 5 INJECTION INTRAVENOUS at 09:01

## 2024-08-25 RX ADMIN — POLYETHYLENE GLYCOL 3350 17 G: 17 POWDER, FOR SOLUTION ORAL at 10:14

## 2024-08-25 RX ADMIN — TROSPIUM CHLORIDE 20 MG: 20 TABLET, FILM COATED ORAL at 10:12

## 2024-08-25 RX ADMIN — CHLORPROMAZINE HYDROCHLORIDE 200 MG: 100 TABLET, FILM COATED ORAL at 10:13

## 2024-08-25 RX ADMIN — SODIUM CHLORIDE, PRESERVATIVE FREE 10 ML: 5 INJECTION INTRAVENOUS at 21:35

## 2024-08-25 RX ADMIN — POLYETHYLENE GLYCOL 3350 17 G: 17 POWDER, FOR SOLUTION ORAL at 21:35

## 2024-08-25 RX ADMIN — POLYETHYLENE GLYCOL 3350 238 G: 17 POWDER, FOR SOLUTION ORAL at 12:56

## 2024-08-25 ASSESSMENT — PAIN SCALES - GENERAL
PAINLEVEL_OUTOF10: 0

## 2024-08-25 NOTE — PLAN OF CARE
Problem: Discharge Planning  Goal: Discharge to home or other facility with appropriate resources  8/25/2024 0252 by Lacy Monteiro RN  Outcome: Progressing  8/24/2024 1722 by Skylar Elizabeth RN  Outcome: Progressing     Problem: Neurosensory - Adult  Goal: Achieves stable or improved neurological status  8/25/2024 0252 by Lacy Monteiro RN  Outcome: Progressing  8/24/2024 1722 by Skylar Elizabeth RN  Outcome: Progressing  Goal: Absence of seizures  8/25/2024 0252 by Lacy Monteiro RN  Outcome: Progressing  8/24/2024 1722 by Skylar Elizabeth RN  Outcome: Progressing  Goal: Remains free of injury related to seizures activity  8/25/2024 0252 by Lacy Monteiro RN  Outcome: Progressing  8/24/2024 1722 by Skylar Elizabeth RN  Outcome: Progressing  Goal: Achieves maximal functionality and self care  8/25/2024 0252 by Lacy Monteiro RN  Outcome: Progressing  8/24/2024 1722 by Skylar Elizabeth RN  Outcome: Progressing     Problem: Genitourinary - Adult  Goal: Absence of urinary retention  8/25/2024 0252 by Lacy Monteiro RN  Outcome: Progressing  8/24/2024 1722 by Skylar Elizabeth RN  Outcome: Progressing  Goal: Urinary catheter remains patent  8/25/2024 0252 by Lacy Monteiro RN  Outcome: Progressing  8/24/2024 1722 by Skylar Elizabeth RN  Outcome: Progressing     Problem: Pain  Goal: Verbalizes/displays adequate comfort level or baseline comfort level  8/25/2024 0252 by Lacy Monteiro RN  Outcome: Progressing  8/24/2024 1722 by Skylar Elizabeth RN  Outcome: Progressing     Problem: ABCDS Injury Assessment  Goal: Absence of physical injury  8/25/2024 0252 by Lacy Monteiro RN  Outcome: Progressing  8/24/2024 1722 by Skylar Elizabeth RN  Outcome: Progressing     Problem: Nutrition Deficit:  Goal: Optimize nutritional status  8/25/2024 0252 by Lacy Monteiro RN  Outcome: Progressing  8/24/2024 1722 by Skylar Elizabeth RN  Outcome: Progressing

## 2024-08-25 NOTE — PROGRESS NOTES
[facility]  Internal Medicine Teaching Residency Program  Inpatient Daily Progress Note  ______________________________________________________________________________    Patient: Yuriy Whelan  YOB: 1995   MRN:[id number]    Acct: [id number]     Room: 0145/0145-01  Admit date: 8/16/2024  Today's date: 08/25/24  Number of days in the hospital: 9    SUBJECTIVE   CC: No chief complaint on file.    Pt examined at bedside. Chart & results reviewed.   -Patient nonverbal at baseline  -Hemodynamically stable with blood pressure 134/93 mmHg  -Breathing on 2 L nasal cannula oxygen  -Patient did not have any bowel movements yesterday, had been started on GoLytely by GI  -X-ray abdomen showed distal gas in the region of sigmoid colon and mild stool burden with gaseous distention of colon.  There was no abnormally dilated small bowel loops    ROS:  ROS unable to obtain due to patient being nonverbal.  BRIEF HISTORY     The patient is a 28 y.o. male with PMHx of autism, intellectual disability, epilepsy is transferred from neurology service to internal medicine service for the concern of constipation/abdominal distention.  Patient was initially transferred from [facility] for the concern of generalized tonic-clonic seizure.    Patient has a history of epilepsy, and was maintained on Vimpat 150 Mg twice daily, and per caregiver patient did not have any seizure-like activity in the past 3 to 4 years.  Per patient's mother his last episode was 10 years back.  UDS came back positive for PCP.  Patient was started on ceftriaxone for the concern of UTI.  And TMU for 3 days did not show any seizure activity.  Patient is currently on VPA 1250 mg twice daily      Patient transferred to medicine service for large bowel obstruction. GI on board.     Review of Systems   Unable to review patient is nonverbal at baseline    OBJECTIVE     Vital Signs:  BP (!) 134/93   Pulse 83   Temp  sodium chloride flush  5-40 mL IntraVENous 2 times per day     Continuous Infusions:    dextrose 5 % and 0.45 % NaCl 100 mL/hr at 08/25/24 0013    sodium chloride 10 mL/hr at 08/22/24 1258     PRN Medicationsmelatonin, 10 mg, Nightly PRN  LORazepam, 2 mg, Q5 Min PRN  sodium chloride flush, 5-40 mL, PRN  sodium chloride, , PRN  potassium chloride, 40 mEq, PRN   Or  potassium alternative oral replacement, 40 mEq, PRN   Or  potassium chloride, 10 mEq, PRN  magnesium sulfate, 2,000 mg, PRN  ondansetron, 4 mg, Q8H PRN   Or  ondansetron, 4 mg, Q6H PRN  acetaminophen, 650 mg, Q6H PRN   Or  acetaminophen, 650 mg, Q6H PRN        Diagnostic Labs:  CBC:   Recent Labs     08/23/24  0520 08/24/24  0504 08/25/24  0631   WBC 3.9 3.2* 3.0*   RBC 4.36 4.29 4.29   HGB 12.6* 12.1* 12.2*   HCT 36.1* 37.2* 36.6*   MCV 82.8 86.7 85.3   RDW 14.0 14.0 14.0    181 173     BMP:   Recent Labs     08/23/24  0520 08/24/24  0504 08/25/24  0631    137 138   K 4.0 4.2 4.1    105 106   CO2 24 23 24   BUN 6 11 10   CREATININE 0.7 0.7 0.7     BNP: No results for input(s): \"BNP\" in the last 72 hours.  PT/INR: No results for input(s): \"PROTIME\", \"INR\" in the last 72 hours.  APTT: No results for input(s): \"APTT\" in the last 72 hours.  CARDIAC ENZYMES: No results for input(s): \"CKMB\", \"CKMBINDEX\", \"TROPONINI\" in the last 72 hours.    Invalid input(s): \"CKTOTAL;3\"  FASTING LIPID PANEL:  Lab Results   Component Value Date    CHOL 141 05/14/2024    HDL 38 (L) 05/14/2024    TRIG 121 05/14/2024     LIVER PROFILE:   Recent Labs     08/22/24  1002   AST 38   ALT 43   BILIDIR 0.4*   BILITOT 0.7   ALKPHOS 94      MICROBIOLOGY:   Lab Results   Component Value Date/Time    CULTURE NO GROWTH 08/23/2024 05:25 PM       Imaging:    XR ABDOMEN (KUB) (SINGLE AP VIEW)    Result Date: 8/22/2024  Nonobstructive bowel gas pattern. Prominent amount of formed stool and correlation with symptomatology for constipation is needed.     CT ABDOMEN PELVIS WO

## 2024-08-25 NOTE — PROGRESS NOTES
Patient got up from bed and walked in room, and got into shower with RN (writer) and two caregivers from patient's living facility.  Patient tolerated ambulation well and has returned to bed.

## 2024-08-25 NOTE — PLAN OF CARE
Problem: Discharge Planning  Goal: Discharge to home or other facility with appropriate resources  8/25/2024 1648 by Skylar Elizabeth RN  Outcome: Progressing  8/25/2024 0252 by Lacy Monteiro RN  Outcome: Progressing     Problem: Neurosensory - Adult  Goal: Achieves stable or improved neurological status  8/25/2024 1648 by Skylar Elizabeth RN  Outcome: Progressing  8/25/2024 0252 by Lacy Monteiro RN  Outcome: Progressing  Goal: Absence of seizures  8/25/2024 1648 by Skylar Elizabeth RN  Outcome: Progressing  8/25/2024 0252 by Lacy Monteiro RN  Outcome: Progressing  Goal: Remains free of injury related to seizures activity  8/25/2024 1648 by Skylar Elizabeth RN  Outcome: Progressing  8/25/2024 0252 by Lacy Monteiro RN  Outcome: Progressing  Goal: Achieves maximal functionality and self care  8/25/2024 1648 by Skylar Elizabeth RN  Outcome: Progressing  8/25/2024 0252 by Lacy Monteiro RN  Outcome: Progressing     Problem: Genitourinary - Adult  Goal: Absence of urinary retention  8/25/2024 1648 by Skylar Elizabeth RN  Outcome: Progressing  8/25/2024 0252 by Lacy Monteiro RN  Outcome: Progressing  Goal: Urinary catheter remains patent  8/25/2024 1648 by Skylar Elizabeth RN  Outcome: Progressing  8/25/2024 0252 by Lacy Monteiro RN  Outcome: Progressing     Problem: Pain  Goal: Verbalizes/displays adequate comfort level or baseline comfort level  8/25/2024 1648 by Skylar Elizabeth RN  Outcome: Progressing  8/25/2024 0252 by Lacy Monteiro RN  Outcome: Progressing     Problem: ABCDS Injury Assessment  Goal: Absence of physical injury  8/25/2024 1648 by Skylar Elizabeth RN  Outcome: Progressing  8/25/2024 0252 by Lacy Monteiro RN  Outcome: Progressing     Problem: Nutrition Deficit:  Goal: Optimize nutritional status  8/25/2024 1648 by Skylar Elizabeth RN  Outcome: Progressing  8/25/2024 0252 by Lacy Monteiro RN  Outcome: Progressing

## 2024-08-25 NOTE — PROGRESS NOTES
[facility]   Gastroenterology Progress Note    Yuriy Whelan is a 28 y.o. male patient.  Hospitalization Day:9      Chief consult reason:   Colonic ileus/distal obstruction    Subjective:  Patient seen and examined, no acute events overnight  According to staff, no bm overnight  Pt not able to tolerate sitting on edge of bed etc to sit on commode  Abd still distended but soft    VITALS:  BP (!) 147/94   Pulse 83   Temp 97.3 °F (36.3 °C) (Axillary)   Resp 13   Ht 1.702 m (5' 7.01\")   Wt 105 kg (231 lb 7.7 oz)   SpO2 99%   BMI 36.25 kg/m²   TEMPERATURE:  Current - Temp: 97.3 °F (36.3 °C); Max - Temp  Av.5 °F (36.4 °C)  Min: 97.2 °F (36.2 °C)  Max: 97.9 °F (36.6 °C)    Physical Assessment:  General appearance: Awake  Mental Status: BOOM  Lungs:  clear to auscultation bilaterally, normal effort  Heart:  regular rate and rhythm, no murmur  Abdomen:  soft, nontender, nondistended, normal bowel sounds, no masses, hepatomegaly, splenomegaly  Extremities:  no edema, redness, tenderness in the calves  Skin:  no gross lesions, rashes, induration    Data Review:    Labs and Imaging:     CBC:  Recent Labs     24  0520 24  0504 24  0631   WBC 3.9 3.2* 3.0*   HGB 12.6* 12.1* 12.2*   MCV 82.8 86.7 85.3   RDW 14.0 14.0 14.0    181 173       ANEMIA STUDIES:  No results for input(s): \"TIBC\", \"FERRITIN\", \"QJDKHTVM85\", \"FOLATE\", \"OCCULTBLD\" in the last 72 hours.    Invalid input(s): \"LABIRON\"    BMP:  Recent Labs     24  0520 24  0504 24  0631    137 138   K 4.0 4.2 4.1    105 106   CO2    BUN 6 11 10   CREATININE 0.7 0.7 0.7   GLUCOSE 93 97 102*   CALCIUM 8.9 9.0 8.9       LFTS:  No results for input(s): \"ALKPHOS\", \"ALT\", \"AST\", \"BILITOT\", \"BILIDIR\", \"LABALBU\" in the last 72 hours.      Amylase/Lipase and Ammonia:  No results for input(s): \"AMYLASE\", \"LIPASE\", \"AMMONIA\" in the last 72 hours.    Acute Hepatitis Panel:  Lab Results   Component Value

## 2024-08-26 ENCOUNTER — APPOINTMENT (OUTPATIENT)
Dept: GENERAL RADIOLOGY | Age: 29
DRG: 101 | End: 2024-08-26
Attending: PSYCHIATRY & NEUROLOGY
Payer: MEDICARE

## 2024-08-26 LAB
ANION GAP SERPL CALCULATED.3IONS-SCNC: 8 MMOL/L (ref 9–16)
BASOPHILS # BLD: <0.03 K/UL (ref 0–0.2)
BASOPHILS NFR BLD: 0 % (ref 0–2)
BUN SERPL-MCNC: 7 MG/DL (ref 6–20)
CALCIUM SERPL-MCNC: 8.9 MG/DL (ref 8.6–10.4)
CHLORIDE SERPL-SCNC: 107 MMOL/L (ref 98–107)
CO2 SERPL-SCNC: 25 MMOL/L (ref 20–31)
CREAT SERPL-MCNC: 0.7 MG/DL (ref 0.7–1.2)
EOSINOPHIL # BLD: <0.03 K/UL (ref 0–0.44)
EOSINOPHILS RELATIVE PERCENT: 0 % (ref 1–4)
ERYTHROCYTE [DISTWIDTH] IN BLOOD BY AUTOMATED COUNT: 14 % (ref 11.8–14.4)
GFR, ESTIMATED: >90 ML/MIN/1.73M2
GLUCOSE BLD-MCNC: 104 MG/DL (ref 75–110)
GLUCOSE BLD-MCNC: 113 MG/DL (ref 75–110)
GLUCOSE BLD-MCNC: 95 MG/DL (ref 75–110)
GLUCOSE SERPL-MCNC: 97 MG/DL (ref 74–99)
HCT VFR BLD AUTO: 37.5 % (ref 40.7–50.3)
HGB BLD-MCNC: 12.5 G/DL (ref 13–17)
IMM GRANULOCYTES # BLD AUTO: <0.03 K/UL (ref 0–0.3)
IMM GRANULOCYTES NFR BLD: 0 %
LYMPHOCYTES NFR BLD: 0.77 K/UL (ref 1.1–3.7)
LYMPHOCYTES RELATIVE PERCENT: 22 % (ref 24–43)
MCH RBC QN AUTO: 28.3 PG (ref 25.2–33.5)
MCHC RBC AUTO-ENTMCNC: 33.3 G/DL (ref 28.4–34.8)
MCV RBC AUTO: 85 FL (ref 82.6–102.9)
MONOCYTES NFR BLD: 0.44 K/UL (ref 0.1–1.2)
MONOCYTES NFR BLD: 13 % (ref 3–12)
NEUTROPHILS NFR BLD: 65 % (ref 36–65)
NEUTS SEG NFR BLD: 2.22 K/UL (ref 1.5–8.1)
NRBC BLD-RTO: 0 PER 100 WBC
PLATELET # BLD AUTO: 175 K/UL (ref 138–453)
PMV BLD AUTO: 9.3 FL (ref 8.1–13.5)
POTASSIUM SERPL-SCNC: 4 MMOL/L (ref 3.7–5.3)
RBC # BLD AUTO: 4.41 M/UL (ref 4.21–5.77)
SODIUM SERPL-SCNC: 140 MMOL/L (ref 136–145)
WBC OTHER # BLD: 3.4 K/UL (ref 3.5–11.3)

## 2024-08-26 PROCEDURE — 74018 RADEX ABDOMEN 1 VIEW: CPT

## 2024-08-26 PROCEDURE — 82947 ASSAY GLUCOSE BLOOD QUANT: CPT

## 2024-08-26 PROCEDURE — 6370000000 HC RX 637 (ALT 250 FOR IP): Performed by: NURSE PRACTITIONER

## 2024-08-26 PROCEDURE — 80048 BASIC METABOLIC PNL TOTAL CA: CPT

## 2024-08-26 PROCEDURE — 6370000000 HC RX 637 (ALT 250 FOR IP)

## 2024-08-26 PROCEDURE — 2060000000 HC ICU INTERMEDIATE R&B

## 2024-08-26 PROCEDURE — 6370000000 HC RX 637 (ALT 250 FOR IP): Performed by: INTERNAL MEDICINE

## 2024-08-26 PROCEDURE — 99231 SBSQ HOSP IP/OBS SF/LOW 25: CPT | Performed by: INTERNAL MEDICINE

## 2024-08-26 PROCEDURE — 99232 SBSQ HOSP IP/OBS MODERATE 35: CPT | Performed by: INTERNAL MEDICINE

## 2024-08-26 PROCEDURE — 51702 INSERT TEMP BLADDER CATH: CPT

## 2024-08-26 PROCEDURE — 6370000000 HC RX 637 (ALT 250 FOR IP): Performed by: PSYCHIATRY & NEUROLOGY

## 2024-08-26 PROCEDURE — 6360000002 HC RX W HCPCS

## 2024-08-26 PROCEDURE — 2580000003 HC RX 258

## 2024-08-26 PROCEDURE — 36415 COLL VENOUS BLD VENIPUNCTURE: CPT

## 2024-08-26 PROCEDURE — 85025 COMPLETE CBC W/AUTO DIFF WBC: CPT

## 2024-08-26 PROCEDURE — C9254 INJECTION, LACOSAMIDE: HCPCS

## 2024-08-26 PROCEDURE — 2500000003 HC RX 250 WO HCPCS

## 2024-08-26 RX ORDER — POLYETHYLENE GLYCOL 3350 17 G/17G
17 POWDER, FOR SOLUTION ORAL 2 TIMES DAILY
Status: DISCONTINUED | OUTPATIENT
Start: 2024-08-26 | End: 2024-08-27 | Stop reason: HOSPADM

## 2024-08-26 RX ORDER — POLYETHYLENE GLYCOL 3350 17 G/17G
17 POWDER, FOR SOLUTION ORAL
Status: DISCONTINUED | OUTPATIENT
Start: 2024-08-26 | End: 2024-08-26

## 2024-08-26 RX ORDER — SENNA AND DOCUSATE SODIUM 50; 8.6 MG/1; MG/1
2 TABLET, FILM COATED ORAL NIGHTLY
Status: DISCONTINUED | OUTPATIENT
Start: 2024-08-27 | End: 2024-08-27 | Stop reason: HOSPADM

## 2024-08-26 RX ORDER — HYDROXYZINE HYDROCHLORIDE 25 MG/1
25 TABLET, FILM COATED ORAL ONCE
Status: COMPLETED | OUTPATIENT
Start: 2024-08-26 | End: 2024-08-26

## 2024-08-26 RX ORDER — BISACODYL 5 MG/1
20 TABLET, DELAYED RELEASE ORAL ONCE
Status: COMPLETED | OUTPATIENT
Start: 2024-08-26 | End: 2024-08-26

## 2024-08-26 RX ADMIN — TROSPIUM CHLORIDE 20 MG: 20 TABLET, FILM COATED ORAL at 16:16

## 2024-08-26 RX ADMIN — TROSPIUM CHLORIDE 20 MG: 20 TABLET, FILM COATED ORAL at 09:42

## 2024-08-26 RX ADMIN — VALPROATE SODIUM 1250 MG: 100 INJECTION, SOLUTION INTRAVENOUS at 00:32

## 2024-08-26 RX ADMIN — CLONIDINE HYDROCHLORIDE 0.2 MG: 0.1 TABLET ORAL at 20:26

## 2024-08-26 RX ADMIN — CHLORPROMAZINE HYDROCHLORIDE 50 MG: 100 TABLET, FILM COATED ORAL at 09:42

## 2024-08-26 RX ADMIN — BISACODYL 20 MG: 5 TABLET, COATED ORAL at 09:31

## 2024-08-26 RX ADMIN — FLUOXETINE HYDROCHLORIDE 40 MG: 20 CAPSULE ORAL at 09:53

## 2024-08-26 RX ADMIN — LACOSAMIDE 200 MG: 10 INJECTION INTRAVENOUS at 23:41

## 2024-08-26 RX ADMIN — Medication 240 ML: at 12:11

## 2024-08-26 RX ADMIN — POLYETHYLENE GLYCOL 3350 17 G: 17 POWDER, FOR SOLUTION ORAL at 20:22

## 2024-08-26 RX ADMIN — HYDROXYZINE HYDROCHLORIDE 25 MG: 25 TABLET ORAL at 13:01

## 2024-08-26 RX ADMIN — BISACODYL 10 MG: 10 SUPPOSITORY RECTAL at 09:53

## 2024-08-26 RX ADMIN — CLONIDINE HYDROCHLORIDE 0.1 MG: 0.1 TABLET ORAL at 09:37

## 2024-08-26 RX ADMIN — Medication 10 MG: at 20:25

## 2024-08-26 RX ADMIN — CLONIDINE HYDROCHLORIDE 0.1 MG: 0.1 TABLET ORAL at 12:02

## 2024-08-26 RX ADMIN — DEXTROSE AND SODIUM CHLORIDE: 5; 450 INJECTION, SOLUTION INTRAVENOUS at 00:33

## 2024-08-26 RX ADMIN — CHLORPROMAZINE HYDROCHLORIDE 200 MG: 100 TABLET, FILM COATED ORAL at 09:38

## 2024-08-26 RX ADMIN — ENOXAPARIN SODIUM 40 MG: 100 INJECTION SUBCUTANEOUS at 09:40

## 2024-08-26 RX ADMIN — VALPROATE SODIUM 1250 MG: 100 INJECTION, SOLUTION INTRAVENOUS at 12:09

## 2024-08-26 RX ADMIN — POLYETHYLENE GLYCOL 3350 17 G: 17 POWDER, FOR SOLUTION ORAL at 09:31

## 2024-08-26 RX ADMIN — LACOSAMIDE 200 MG: 10 INJECTION INTRAVENOUS at 00:17

## 2024-08-26 RX ADMIN — CHLORPROMAZINE HYDROCHLORIDE 300 MG: 100 TABLET, FILM COATED ORAL at 20:25

## 2024-08-26 RX ADMIN — CHLORPROMAZINE HYDROCHLORIDE 100 MG: 100 TABLET, FILM COATED ORAL at 12:02

## 2024-08-26 RX ADMIN — SODIUM CHLORIDE, PRESERVATIVE FREE 10 ML: 5 INJECTION INTRAVENOUS at 09:41

## 2024-08-26 RX ADMIN — VALPROATE SODIUM 1250 MG: 100 INJECTION, SOLUTION INTRAVENOUS at 23:36

## 2024-08-26 RX ADMIN — SENNOSIDES AND DOCUSATE SODIUM 2 TABLET: 50; 8.6 TABLET ORAL at 09:38

## 2024-08-26 RX ADMIN — DEXTROSE AND SODIUM CHLORIDE: 5; 450 INJECTION, SOLUTION INTRAVENOUS at 10:55

## 2024-08-26 RX ADMIN — LACOSAMIDE 200 MG: 10 INJECTION INTRAVENOUS at 12:02

## 2024-08-26 ASSESSMENT — PAIN SCALES - WONG BAKER: WONGBAKER_NUMERICALRESPONSE: NO HURT

## 2024-08-26 ASSESSMENT — PAIN SCALES - GENERAL: PAINLEVEL_OUTOF10: 0

## 2024-08-26 NOTE — CARE COORDINATION
Transitional Planning  Spoke with mother, legal guardian, at bedside. Plan is to return back to [facility].      355 pm Spoke with Roxana ELDER, states tentative dc tomorrow. Called [facility], [phone] M left to return call.

## 2024-08-26 NOTE — PLAN OF CARE
Problem: Discharge Planning  Goal: Discharge to home or other facility with appropriate resources  8/26/2024 0519 by Lacy Monteiro RN  Outcome: Progressing  8/25/2024 1648 by Skylar Elizabeth RN  Outcome: Progressing     Problem: Neurosensory - Adult  Goal: Achieves stable or improved neurological status  8/26/2024 0519 by Lacy Monteiro RN  Outcome: Progressing  8/25/2024 1648 by Skylar Elizabeth RN  Outcome: Progressing  Goal: Absence of seizures  8/26/2024 0519 by Lacy Monteiro RN  Outcome: Progressing  8/25/2024 1648 by Skylar Elizabeth RN  Outcome: Progressing  Goal: Remains free of injury related to seizures activity  8/26/2024 0519 by Lacy Monteiro RN  Outcome: Progressing  8/25/2024 1648 by Skylar Elizabeth RN  Outcome: Progressing  Goal: Achieves maximal functionality and self care  8/26/2024 0519 by Lacy Monteiro RN  Outcome: Progressing  8/25/2024 1648 by Skylar Elizabeth RN  Outcome: Progressing     Problem: Genitourinary - Adult  Goal: Absence of urinary retention  8/26/2024 0519 by Lacy Monteiro RN  Outcome: Progressing  8/25/2024 1648 by Skylar Elizabeth RN  Outcome: Progressing  Goal: Urinary catheter remains patent  8/26/2024 0519 by Lacy Monteiro RN  Outcome: Progressing  8/25/2024 1648 by Skylar Elizabeth RN  Outcome: Progressing     Problem: Pain  Goal: Verbalizes/displays adequate comfort level or baseline comfort level  8/26/2024 0519 by Lacy Monteiro RN  Outcome: Progressing  8/25/2024 1648 by Skylar Elizabeth RN  Outcome: Progressing     Problem: ABCDS Injury Assessment  Goal: Absence of physical injury  8/26/2024 0519 by Lacy Monteiro RN  Outcome: Progressing  8/25/2024 1648 by Skylar Elizabeth RN  Outcome: Progressing     Problem: Nutrition Deficit:  Goal: Optimize nutritional status  8/26/2024 0519 by Lacy Monteiro RN  Outcome: Progressing  8/25/2024 1648 by Skylar Elizabeth RN  Outcome: Progressing

## 2024-08-26 NOTE — PROGRESS NOTES
[facility]  Internal Medicine Teaching Residency Program  Inpatient Daily Progress Note  ______________________________________________________________________________    Patient: Yuriy Whelan  YOB: 1995   MRN:[id number]    Acct: [id number]     Room: 0145/0145-01  Admit date: 8/16/2024  Today's date: 08/26/24  Number of days in the hospital: 10    SUBJECTIVE   CC: No chief complaint on file.    Pt examined at bedside. Chart & results reviewed.   -Patient nonverbal at baseline  -Hemodynamically stable with blood pressure 134/93 mmHg  -Breathing on 2 L nasal cannula oxygen  -Patient had a  bowel movements yesterday, had been started on GoLytely by GI  -X-ray abdomen showed distal gas in the region of sigmoid colon and mild stool burden with gaseous distention of colon.  There was no abnormally dilated small bowel loops    ROS:  ROS unable to obtain due to patient being nonverbal.  BRIEF HISTORY     The patient is a 28 y.o. male with PMHx of autism, intellectual disability, epilepsy is transferred from neurology service to internal medicine service for the concern of constipation/abdominal distention.  Patient was initially transferred from [facility] for the concern of generalized tonic-clonic seizure.    Patient has a history of epilepsy, and was maintained on Vimpat 150 Mg twice daily, and per caregiver patient did not have any seizure-like activity in the past 3 to 4 years.  Per patient's mother his last episode was 10 years back.  UDS came back positive for PCP.  Patient was started on ceftriaxone for the concern of UTI.  And TMU for 3 days did not show any seizure activity.  Patient is currently on VPA 1250 mg twice daily      Patient transferred to medicine service for large bowel obstruction. GI on board.     Review of Systems   Unable to review patient is nonverbal at baseline    OBJECTIVE     Vital Signs:  /86   Pulse 95   Temp 98.4 °F  times per day     Continuous Infusions:    dextrose 5 % and 0.45 % NaCl 100 mL/hr at 08/26/24 1055    sodium chloride 10 mL/hr at 08/22/24 1258     PRN Medicationsmelatonin, 10 mg, Nightly PRN  LORazepam, 2 mg, Q5 Min PRN  sodium chloride flush, 5-40 mL, PRN  sodium chloride, , PRN  potassium chloride, 40 mEq, PRN   Or  potassium alternative oral replacement, 40 mEq, PRN   Or  potassium chloride, 10 mEq, PRN  magnesium sulfate, 2,000 mg, PRN  ondansetron, 4 mg, Q8H PRN   Or  ondansetron, 4 mg, Q6H PRN  acetaminophen, 650 mg, Q6H PRN   Or  acetaminophen, 650 mg, Q6H PRN        Diagnostic Labs:  CBC:   Recent Labs     08/24/24  0504 08/25/24  0631 08/26/24  0626   WBC 3.2* 3.0* 3.4*   RBC 4.29 4.29 4.41   HGB 12.1* 12.2* 12.5*   HCT 37.2* 36.6* 37.5*   MCV 86.7 85.3 85.0   RDW 14.0 14.0 14.0    173 175     BMP:   Recent Labs     08/24/24  0504 08/25/24  0631 08/26/24  0626    138 140   K 4.2 4.1 4.0    106 107   CO2 23 24 25   BUN 11 10 7   CREATININE 0.7 0.7 0.7     BNP: No results for input(s): \"BNP\" in the last 72 hours.  PT/INR: No results for input(s): \"PROTIME\", \"INR\" in the last 72 hours.  APTT: No results for input(s): \"APTT\" in the last 72 hours.  CARDIAC ENZYMES: No results for input(s): \"CKMB\", \"CKMBINDEX\", \"TROPONINI\" in the last 72 hours.    Invalid input(s): \"CKTOTAL;3\"  FASTING LIPID PANEL:  Lab Results   Component Value Date    CHOL 141 05/14/2024    HDL 38 (L) 05/14/2024    TRIG 121 05/14/2024     LIVER PROFILE:   No results for input(s): \"AST\", \"ALT\", \"BILIDIR\", \"BILITOT\", \"ALKPHOS\" in the last 72 hours.    Invalid input(s): \"ALB\"     MICROBIOLOGY:   Lab Results   Component Value Date/Time    CULTURE NO GROWTH 08/23/2024 05:25 PM       Imaging:    XR ABDOMEN (KUB) (SINGLE AP VIEW)    Result Date: 8/22/2024  Nonobstructive bowel gas pattern. Prominent amount of formed stool and correlation with symptomatology for constipation is needed.     CT ABDOMEN PELVIS WO CONTRAST Additional

## 2024-08-26 NOTE — PROGRESS NOTES
[facility]   Gastroenterology Progress Note    Yuriy Whelan is a 28 y.o. male patient.  Hospitalization Day:10      Chief consult reason:   Colonic ileus/distal obstruction    Subjective:  Patient seen and examined, no acute events overnight  Patient had small BM yesterday evening  Tolerating full liquid diet with some ice cream and Jell-O, patient likes soda    VITALS:  BP (!) 150/98   Pulse 90   Temp 97.3 °F (36.3 °C) (Temporal)   Resp 14   Ht 1.702 m (5' 7.01\")   Wt 105 kg (231 lb 7.7 oz)   SpO2 95%   BMI 36.25 kg/m²   TEMPERATURE:  Current - Temp: 97.3 °F (36.3 °C); Max - Temp  Av.6 °F (36.4 °C)  Min: 97.3 °F (36.3 °C)  Max: 97.9 °F (36.6 °C)    Physical Assessment:  General appearance: Awake  Mental Status: BOOM  Lungs:  clear to auscultation bilaterally, normal effort  Heart:  regular rate and rhythm, no murmur  Abdomen:  soft, nontender, nondistended, normal bowel sounds, no masses, hepatomegaly, splenomegaly  Extremities:  no edema, redness, tenderness in the calves  Skin:  no gross lesions, rashes, induration    Data Review:    Labs and Imaging:     CBC:  Recent Labs     24  0504 24  0631 24  0626   WBC 3.2* 3.0* 3.4*   HGB 12.1* 12.2* 12.5*   MCV 86.7 85.3 85.0   RDW 14.0 14.0 14.0    173 175       ANEMIA STUDIES:  No results for input(s): \"TIBC\", \"FERRITIN\", \"[id number]\", \"FOLATE\", \"OCCULTBLD\" in the last 72 hours.    Invalid input(s): \"LABIRON\"    BMP:  Recent Labs     24  0504 24  0631 24  0626    138 140   K 4.2 4.1 4.0    106 107   CO2 23 24 25   BUN 11 10 7   CREATININE 0.7 0.7 0.7   GLUCOSE 97 102* 97   CALCIUM 9.0 8.9 8.9       LFTS:  No results for input(s): \"ALKPHOS\", \"ALT\", \"AST\", \"BILITOT\", \"BILIDIR\", \"LABALBU\" in the last 72 hours.      Amylase/Lipase and Ammonia:  No results for input(s): \"AMYLASE\", \"LIPASE\", \"AMMONIA\" in the last 72 hours.    Acute Hepatitis Panel:  Lab Results   Component Value Date/Time

## 2024-08-26 NOTE — PLAN OF CARE
Problem: Discharge Planning  Goal: Discharge to home or other facility with appropriate resources  8/26/2024 1623 by Lilia Gonzáles RN  Outcome: Progressing  Flowsheets (Taken 8/26/2024 0800)  Discharge to home or other facility with appropriate resources:   Identify barriers to discharge with patient and caregiver   Arrange for needed discharge resources and transportation as appropriate  8/26/2024 0519 by Lacy Monteiro RN  Outcome: Progressing     Problem: Neurosensory - Adult  Goal: Achieves stable or improved neurological status  8/26/2024 1623 by Lilia Gonzáles RN  Outcome: Progressing  Flowsheets (Taken 8/26/2024 0800)  Achieves stable or improved neurological status:   Assess for and report changes in neurological status   Initiate measures to prevent increased intracranial pressure   Maintain blood pressure and fluid volume within ordered parameters to optimize cerebral perfusion and minimize risk of hemorrhage   Monitor temperature, glucose, and sodium. Initiate appropriate interventions as ordered  8/26/2024 0519 by Lacy Monteiro RN  Outcome: Progressing  Goal: Absence of seizures  8/26/2024 1623 by Lilia Gonzáles RN  Outcome: Progressing  Flowsheets (Taken 8/26/2024 0800)  Absence of seizures:   Monitor for seizure activity.  If seizure occurs, document type and location of movements and any associated apnea   If seizure occurs, turn head to side and suction secretions as needed   Administer anticonvulsants as ordered   Support airway/breathing, administer oxygen as needed  8/26/2024 0519 by Lacy Monteiro RN  Outcome: Progressing  Goal: Remains free of injury related to seizures activity  8/26/2024 1623 by Lilia Gonzáles RN  Outcome: Progressing  Flowsheets (Taken 8/26/2024 0800)  Remains free of injury related to seizure activity:   Maintain airway, patient safety  and administer oxygen as ordered   Monitor patient for seizure activity, document and report duration and description of seizure  to Licensed Independent Practitioner   If seizure occurs, turn patient to side and suction secretions as needed   Reorient patient post seizure   Seizure pads on all 4 side rails  8/26/2024 0519 by Lacy Monteiro RN  Outcome: Progressing  Goal: Achieves maximal functionality and self care  8/26/2024 1623 by Lilia Gonzáles RN  Outcome: Progressing  Flowsheets (Taken 8/26/2024 0800)  Achieves maximal functionality and self care:   Monitor swallowing and airway patency with patient fatigue and changes in neurological status   Encourage visually impaired, hearing impaired and aphasic patients to use assistive/communication devices   Encourage and assist patient to increase activity and self care with guidance from physical therapy/occupational therapy  8/26/2024 0519 by Lacy Monteiro RN  Outcome: Progressing     Problem: Genitourinary - Adult  Goal: Absence of urinary retention  8/26/2024 1623 by Lilia Gonzáles RN  Outcome: Progressing  Flowsheets (Taken 8/26/2024 0800)  Absence of urinary retention:   Assess patient’s ability to void and empty bladder   Place urinary catheter per Licensed Independent Practitioner order if needed   Monitor intake/output and perform bladder scan as needed  8/26/2024 0519 by Lacy Monteiro RN  Outcome: Progressing  Goal: Urinary catheter remains patent  8/26/2024 1623 by Lilia Gonzáles RN  Outcome: Progressing  Flowsheets (Taken 8/26/2024 0800)  Urinary catheter remains patent:   Assess patency of urinary catheter   Irrigate catheter per Licensed Independent Practitioner order if indicated and notify Licensed Independent Practitioner if unable to irrigate  8/26/2024 0519 by Lacy Monteiro RN  Outcome: Progressing     Problem: Pain  Goal: Verbalizes/displays adequate comfort level or baseline comfort level  8/26/2024 1623 by Lilia Gonzáles RN  Outcome: Progressing  Flowsheets (Taken 8/26/2024 0800)  Verbalizes/displays adequate comfort level or baseline comfort level: Implement

## 2024-08-27 VITALS
OXYGEN SATURATION: 98 % | SYSTOLIC BLOOD PRESSURE: 137 MMHG | DIASTOLIC BLOOD PRESSURE: 87 MMHG | HEART RATE: 103 BPM | RESPIRATION RATE: 14 BRPM | BODY MASS INDEX: 36.33 KG/M2 | TEMPERATURE: 98.5 F | WEIGHT: 231.48 LBS | HEIGHT: 67 IN

## 2024-08-27 PROBLEM — F79 INTELLECTUAL FUNCTIONING DISABILITY: Status: ACTIVE | Noted: 2024-08-27

## 2024-08-27 LAB
AMPHET UR QL SCN: NEGATIVE
ANION GAP SERPL CALCULATED.3IONS-SCNC: 10 MMOL/L (ref 9–16)
BARBITURATES UR QL SCN: NEGATIVE
BASOPHILS # BLD: <0.03 K/UL (ref 0–0.2)
BASOPHILS NFR BLD: 0 % (ref 0–2)
BENZODIAZ UR QL: NEGATIVE
BUN SERPL-MCNC: 8 MG/DL (ref 6–20)
CALCIUM SERPL-MCNC: 8.9 MG/DL (ref 8.6–10.4)
CANNABINOIDS UR QL SCN: NEGATIVE
CHLORIDE SERPL-SCNC: 106 MMOL/L (ref 98–107)
CO2 SERPL-SCNC: 23 MMOL/L (ref 20–31)
COCAINE UR QL SCN: NEGATIVE
CREAT SERPL-MCNC: 0.8 MG/DL (ref 0.7–1.2)
EOSINOPHIL # BLD: <0.03 K/UL (ref 0–0.44)
EOSINOPHILS RELATIVE PERCENT: 0 % (ref 1–4)
ERYTHROCYTE [DISTWIDTH] IN BLOOD BY AUTOMATED COUNT: 14.3 % (ref 11.8–14.4)
FENTANYL UR QL: NEGATIVE
GFR, ESTIMATED: >90 ML/MIN/1.73M2
GLUCOSE BLD-MCNC: 95 MG/DL (ref 75–110)
GLUCOSE BLD-MCNC: 97 MG/DL (ref 75–110)
GLUCOSE SERPL-MCNC: 109 MG/DL (ref 74–99)
HCT VFR BLD AUTO: 39.2 % (ref 40.7–50.3)
HGB BLD-MCNC: 12.8 G/DL (ref 13–17)
IMM GRANULOCYTES # BLD AUTO: <0.03 K/UL (ref 0–0.3)
IMM GRANULOCYTES NFR BLD: 0 %
LYMPHOCYTES NFR BLD: 0.81 K/UL (ref 1.1–3.7)
LYMPHOCYTES RELATIVE PERCENT: 22 % (ref 24–43)
MCH RBC QN AUTO: 28.4 PG (ref 25.2–33.5)
MCHC RBC AUTO-ENTMCNC: 32.7 G/DL (ref 28.4–34.8)
MCV RBC AUTO: 86.9 FL (ref 82.6–102.9)
METHADONE UR QL: NEGATIVE
MONOCYTES NFR BLD: 0.43 K/UL (ref 0.1–1.2)
MONOCYTES NFR BLD: 12 % (ref 3–12)
NEUTROPHILS NFR BLD: 66 % (ref 36–65)
NEUTS SEG NFR BLD: 2.38 K/UL (ref 1.5–8.1)
NRBC BLD-RTO: 0 PER 100 WBC
OPIATES UR QL SCN: NEGATIVE
OXYCODONE UR QL SCN: NEGATIVE
PCP UR QL SCN: NEGATIVE
PLATELET # BLD AUTO: 210 K/UL (ref 138–453)
PMV BLD AUTO: 9.6 FL (ref 8.1–13.5)
POTASSIUM SERPL-SCNC: 4.3 MMOL/L (ref 3.7–5.3)
RBC # BLD AUTO: 4.51 M/UL (ref 4.21–5.77)
SODIUM SERPL-SCNC: 139 MMOL/L (ref 136–145)
TEST INFORMATION: NORMAL
WBC OTHER # BLD: 3.6 K/UL (ref 3.5–11.3)

## 2024-08-27 PROCEDURE — 6370000000 HC RX 637 (ALT 250 FOR IP)

## 2024-08-27 PROCEDURE — 99239 HOSP IP/OBS DSCHRG MGMT >30: CPT | Performed by: INTERNAL MEDICINE

## 2024-08-27 PROCEDURE — 6370000000 HC RX 637 (ALT 250 FOR IP): Performed by: NURSE PRACTITIONER

## 2024-08-27 PROCEDURE — 2580000003 HC RX 258

## 2024-08-27 PROCEDURE — 80048 BASIC METABOLIC PNL TOTAL CA: CPT

## 2024-08-27 PROCEDURE — 82947 ASSAY GLUCOSE BLOOD QUANT: CPT

## 2024-08-27 PROCEDURE — C9254 INJECTION, LACOSAMIDE: HCPCS

## 2024-08-27 PROCEDURE — 51702 INSERT TEMP BLADDER CATH: CPT

## 2024-08-27 PROCEDURE — 36415 COLL VENOUS BLD VENIPUNCTURE: CPT

## 2024-08-27 PROCEDURE — 80307 DRUG TEST PRSMV CHEM ANLYZR: CPT

## 2024-08-27 PROCEDURE — 6360000002 HC RX W HCPCS

## 2024-08-27 PROCEDURE — 2500000003 HC RX 250 WO HCPCS

## 2024-08-27 PROCEDURE — 85025 COMPLETE CBC W/AUTO DIFF WBC: CPT

## 2024-08-27 PROCEDURE — 6370000000 HC RX 637 (ALT 250 FOR IP): Performed by: PSYCHIATRY & NEUROLOGY

## 2024-08-27 RX ORDER — POLYETHYLENE GLYCOL 3350 17 G/17G
17 POWDER, FOR SOLUTION ORAL 2 TIMES DAILY
Qty: 527 G | Refills: 1 | Status: SHIPPED | OUTPATIENT
Start: 2024-08-27 | End: 2024-09-26

## 2024-08-27 RX ORDER — CHLORPROMAZINE HYDROCHLORIDE 100 MG/1
100 TABLET, FILM COATED ORAL
Qty: 30 TABLET | Refills: 0 | Status: SHIPPED | OUTPATIENT
Start: 2024-08-27 | End: 2024-09-26

## 2024-08-27 RX ORDER — CHLORPROMAZINE HYDROCHLORIDE 200 MG/1
200 TABLET, FILM COATED ORAL DAILY
Qty: 30 TABLET | Refills: 0 | Status: SHIPPED | OUTPATIENT
Start: 2024-08-27 | End: 2024-09-26

## 2024-08-27 RX ORDER — CHLORPROMAZINE HYDROCHLORIDE 100 MG/1
300 TABLET, FILM COATED ORAL NIGHTLY
Qty: 90 TABLET | Refills: 2 | Status: SHIPPED | OUTPATIENT
Start: 2024-08-27 | End: 2024-11-25

## 2024-08-27 RX ORDER — LACOSAMIDE 100 MG/1
200 TABLET ORAL 2 TIMES DAILY
Qty: 60 TABLET | Refills: 1 | Status: SHIPPED | OUTPATIENT
Start: 2024-08-27 | End: 2024-10-26

## 2024-08-27 RX ORDER — CLONIDINE HYDROCHLORIDE 0.1 MG/1
0.1 TABLET ORAL 2 TIMES DAILY
Qty: 60 TABLET | Refills: 3 | Status: SHIPPED | OUTPATIENT
Start: 2024-08-27

## 2024-08-27 RX ORDER — BISACODYL 10 MG
10 SUPPOSITORY, RECTAL RECTAL DAILY
Qty: 30 SUPPOSITORY | Refills: 0 | Status: SHIPPED | OUTPATIENT
Start: 2024-08-27 | End: 2024-08-28

## 2024-08-27 RX ORDER — TROSPIUM CHLORIDE 20 MG/1
20 TABLET, FILM COATED ORAL
Qty: 60 TABLET | Refills: 3 | Status: SHIPPED | OUTPATIENT
Start: 2024-08-27 | End: 2024-08-28

## 2024-08-27 RX ORDER — CHLORPROMAZINE HYDROCHLORIDE 50 MG/1
50 TABLET, FILM COATED ORAL DAILY
Qty: 30 TABLET | Refills: 0 | Status: SHIPPED | OUTPATIENT
Start: 2024-08-27 | End: 2024-09-26

## 2024-08-27 RX ORDER — CLONIDINE HYDROCHLORIDE 0.2 MG/1
0.2 TABLET ORAL NIGHTLY
Qty: 60 TABLET | Refills: 3 | Status: SHIPPED | OUTPATIENT
Start: 2024-08-27

## 2024-08-27 RX ADMIN — FLUOXETINE HYDROCHLORIDE 40 MG: 20 CAPSULE ORAL at 09:34

## 2024-08-27 RX ADMIN — CLONIDINE HYDROCHLORIDE 0.1 MG: 0.1 TABLET ORAL at 12:09

## 2024-08-27 RX ADMIN — CHLORPROMAZINE HYDROCHLORIDE 100 MG: 100 TABLET, FILM COATED ORAL at 12:08

## 2024-08-27 RX ADMIN — CHLORPROMAZINE HYDROCHLORIDE 200 MG: 100 TABLET, FILM COATED ORAL at 08:52

## 2024-08-27 RX ADMIN — ENOXAPARIN SODIUM 40 MG: 100 INJECTION SUBCUTANEOUS at 08:51

## 2024-08-27 RX ADMIN — LACOSAMIDE 200 MG: 10 INJECTION INTRAVENOUS at 12:09

## 2024-08-27 RX ADMIN — CLONIDINE HYDROCHLORIDE 0.1 MG: 0.1 TABLET ORAL at 08:52

## 2024-08-27 RX ADMIN — POLYETHYLENE GLYCOL 3350 17 G: 17 POWDER, FOR SOLUTION ORAL at 08:54

## 2024-08-27 RX ADMIN — BISACODYL 10 MG: 10 SUPPOSITORY RECTAL at 08:52

## 2024-08-27 RX ADMIN — SODIUM CHLORIDE, PRESERVATIVE FREE 10 ML: 5 INJECTION INTRAVENOUS at 08:52

## 2024-08-27 RX ADMIN — CHLORPROMAZINE HYDROCHLORIDE 50 MG: 100 TABLET, FILM COATED ORAL at 08:53

## 2024-08-27 RX ADMIN — VALPROATE SODIUM 1250 MG: 100 INJECTION, SOLUTION INTRAVENOUS at 12:24

## 2024-08-27 RX ADMIN — TROSPIUM CHLORIDE 20 MG: 20 TABLET, FILM COATED ORAL at 08:52

## 2024-08-27 NOTE — PLAN OF CARE
Problem: Discharge Planning  Goal: Discharge to home or other facility with appropriate resources  8/27/2024 0427 by Cindy Nance RN  Outcome: Progressing     Problem: Neurosensory - Adult  Goal: Achieves stable or improved neurological status  8/27/2024 0427 by Cindy Nance RN  Outcome: Progressing  Flowsheets (Taken 8/26/2024 2000)  Achieves stable or improved neurological status: Assess for and report changes in neurological status     Problem: Neurosensory - Adult  Goal: Absence of seizures  8/27/2024 0427 by Cindy Nance RN  Outcome: Progressing  Flowsheets (Taken 8/26/2024 2000)  Absence of seizures: Monitor for seizure activity.  If seizure occurs, document type and location of movements and any associated apnea     Problem: Neurosensory - Adult  Goal: Remains free of injury related to seizures activity  8/27/2024 0427 by Cindy Nance RN  Outcome: Progressing  Flowsheets (Taken 8/26/2024 2000)  Remains free of injury related to seizure activity: Maintain airway, patient safety  and administer oxygen as ordered

## 2024-08-27 NOTE — PROGRESS NOTES
Patient discharged to [facility] via EMS on a stretcher, report called. Report given to EMS. Iv removed and patient belongings sent.

## 2024-08-27 NOTE — CARE COORDINATION
Transitional Planning  Called [facility], [phone], notified of discharge today.     [facility]  time is 25 minutes.  Update provided to Roxana ELDER.  She will notify [facility] when giving report.      Called Legal Guardian, Pam, update provided and agrees to discharge.     Discharge Report    [facility]  Clinical Case Management Department  Written by: Elizabeth Salazar RN    Patient Name: Yuriy Whelan  Attending Provider: Selvin Ruth MD  Admit Date: 2024  7:30 PM  MRN: [id number]  Account: [id number]                     : 1995  Discharge Date: 24      Disposition: [facility]     Elizabeth Salazar RN

## 2024-08-27 NOTE — DISCHARGE INSTR - COC
Continuity of Care Form    Patient Name: Yuriy Whelan   :  1995  MRN:  [id number]    Admit date:  2024  Discharge date:  2024    Code Status Order: Full Code   Advance Directives:   Advance Care Flowsheet Documentation             Admitting Physician:  No admitting provider for patient encounter.  PCP: Keshawn Carranza MD    Discharging Nurse: JAEL Rodrigues  Discharging Hospital Unit/Room#: 0145/0145-01  Discharging Unit Phone Number: [phone]    Emergency Contact:   Extended Emergency Contact Information  Primary Emergency Contact: Pam Whelan  Address: [address] [place] [address] [facility]  Home Phone: [phone]  Mobile Phone: [phone]  Relation: Legal Guardian  Hearing or visual needs: None  Other needs: None  Preferred language: English   needed? No    Past Surgical History:  No past surgical history on file.    Immunization History:   Immunization History   Administered Date(s) Administered    COVID-19, MODERNA BLUE border, Primary or Immunocompromised, (age 12y+), IM, 100 mcg/0.5mL 2021, 2021, 2021       Active Problems:  Patient Active Problem List   Diagnosis Code    MR (mental retardation) F79    Rage [id number]    Spells [id number]    Seizures (HCC) R56.9    Foot swelling M79.89    Finger pain M79.646    Sore throat J02.9    Urinary incontinence without sensory awareness N39.42    Altered mental status R41.82    Seizure (HCC) R56.9    Abdominal distention R14.0    Ileus (HCC) K56.7    Colon distention K63.89    Constipation K59.00    Abnormal EEG R94.01    Encephalopathy G93.40       Isolation/Infection:   Isolation            No Isolation          Patient Infection Status       None to display            Nurse Assessment:  Last Vital Signs: BP (!) 148/96   Pulse 95   Temp 98.3 °F (36.8 °C) (Axillary)   Resp 18   Ht 1.702 m (5' 7.01\")   Wt 105 kg (231 lb 7.7 oz)   SpO2 98%   BMI 36.25 kg/m²     Last documented pain

## 2024-08-27 NOTE — PROGRESS NOTES
[facility]  Internal Medicine Teaching Residency Program  Inpatient Daily Progress Note  ______________________________________________________________________________    Patient: Yuriy Whelan  YOB: 1995   MRN:[id number]    Acct: [id number]     Room: 0145/0145-01  Admit date: 8/16/2024  Today's date: 08/27/24  Number of days in the hospital: 11    SUBJECTIVE   CC: No chief complaint on file.    Pt examined at bedside. Chart & results reviewed.   -Patient hemodynamically stable with blood pressure 148/96 mmHg  -Breathing on room air  -Labs show sodium 139, potassium 4.3, normal renal function test, glucose 109, WBC 3.6, hemoglobin 12 point  -Had bowel movement yesterday.  Started on full liquid diet and advancing as tolerated    ROS:  ROS unable to obtain due to patient being nonverbal.  BRIEF HISTORY     The patient is a 28 y.o. male with PMHx of autism, intellectual disability, epilepsy is transferred from neurology service to internal medicine service for the concern of constipation/abdominal distention.  Patient was initially transferred from [facility] for the concern of generalized tonic-clonic seizure.    Patient has a history of epilepsy, and was maintained on Vimpat 150 Mg twice daily, and per caregiver patient did not have any seizure-like activity in the past 3 to 4 years.  Per patient's mother his last episode was 10 years back.  UDS came back positive for PCP.  Patient was started on ceftriaxone for the concern of UTI.  And TMU for 3 days did not show any seizure activity.  Patient is currently on VPA 1250 mg twice daily      Patient transferred to medicine service for large bowel obstruction. GI on board.     Review of Systems   Unable to review patient is nonverbal at baseline    OBJECTIVE     Vital Signs:  BP (!) 147/92   Pulse 94   Temp 98.5 °F (36.9 °C) (Oral)   Resp 17   Ht 1.702 m (5' 7.01\")   Wt 105 kg (231 lb 7.7 oz)   SpO2 95%   needed suppositories and MiraLAX twice daily at discharge  -No other stool softeners as of now    Hypertension:  -on clonidine 0.1 mg twice daily  -0.2 mg nightly        UA suggestive of UTI: Resolved  -Completed Rocephin 1g IV daily X 5 days    Consult Psychiatry : Adjusted medications to avoid further constipation    DVT Ppx: Lovenox   GI Ppx: Not Indicated  Diet: ADULT ORAL NUTRITION SUPPLEMENT; Lunch; Other Oral Supplement; Please send 3 cans lemon and orange gatorade for bowel prep  ADULT DIET; Clear Liquid   PT/OT: Consulted  Case Management: Consulted    Disposition: Monitor inpatient.     Billy Pino MD  PGY-3, Internal Medicine Resident  [facility], [place]         8/27/2024, 12:20 AM

## 2024-08-27 NOTE — PLAN OF CARE
Problem: Discharge Planning  Goal: Discharge to home or other facility with appropriate resources  8/27/2024 1302 by Lilia Gonzáles, RN  Outcome: HH/HSPC Resolved Met  Flowsheets (Taken 8/27/2024 0800)  Discharge to home or other facility with appropriate resources: Identify barriers to discharge with patient and caregiver  8/27/2024 0427 by Cindy Nance RN  Outcome: Progressing     Problem: Neurosensory - Adult  Goal: Achieves stable or improved neurological status  8/27/2024 1302 by Lilia Gonzáles RN  Outcome: HH/HSPC Resolved Met  Flowsheets (Taken 8/27/2024 0800)  Achieves stable or improved neurological status:   Assess for and report changes in neurological status   Initiate measures to prevent increased intracranial pressure   Maintain blood pressure and fluid volume within ordered parameters to optimize cerebral perfusion and minimize risk of hemorrhage   Monitor temperature, glucose, and sodium. Initiate appropriate interventions as ordered  8/27/2024 0427 by Cindy Nance RN  Outcome: Progressing  Flowsheets (Taken 8/26/2024 2000)  Achieves stable or improved neurological status: Assess for and report changes in neurological status  Goal: Absence of seizures  8/27/2024 1302 by Lilia Gonzáles RN  Outcome: HH/HSPC Resolved Met  Flowsheets (Taken 8/27/2024 0800)  Absence of seizures:   Monitor for seizure activity.  If seizure occurs, document type and location of movements and any associated apnea   If seizure occurs, turn head to side and suction secretions as needed   Administer anticonvulsants as ordered   Support airway/breathing, administer oxygen as needed  8/27/2024 0427 by Cindy Nance, RN  Outcome: Progressing  Flowsheets (Taken 8/26/2024 2000)  Absence of seizures: Monitor for seizure activity.  If seizure occurs, document type and location of movements and any associated apnea  Goal: Remains free of injury related to seizures activity  8/27/2024 1302 by Lilia Gonzáles RN  Outcome:  HH/HSPC Resolved Met  Flowsheets (Taken 8/27/2024 0800)  Remains free of injury related to seizure activity:   Maintain airway, patient safety  and administer oxygen as ordered   Monitor patient for seizure activity, document and report duration and description of seizure to Licensed Independent Practitioner   If seizure occurs, turn patient to side and suction secretions as needed   Reorient patient post seizure   Seizure pads on all 4 side rails  8/27/2024 0427 by Cindy Nance, RN  Outcome: Progressing  Flowsheets (Taken 8/26/2024 2000)  Remains free of injury related to seizure activity: Maintain airway, patient safety  and administer oxygen as ordered  Goal: Achieves maximal functionality and self care  Outcome: HH/HSPC Resolved Met  Flowsheets (Taken 8/27/2024 0800)  Achieves maximal functionality and self care:   Monitor swallowing and airway patency with patient fatigue and changes in neurological status   Encourage and assist patient to increase activity and self care with guidance from physical therapy/occupational therapy   Encourage visually impaired, hearing impaired and aphasic patients to use assistive/communication devices     Problem: Genitourinary - Adult  Goal: Absence of urinary retention  Outcome: HH/HSPC Resolved Met  Flowsheets (Taken 8/27/2024 0800)  Absence of urinary retention:   Assess patient’s ability to void and empty bladder   Monitor intake/output and perform bladder scan as needed   Place urinary catheter per Licensed Independent Practitioner order if needed  Goal: Urinary catheter remains patent  Outcome: HH/HSPC Resolved Met  Flowsheets (Taken 8/27/2024 0800)  Urinary catheter remains patent: Assess patency of urinary catheter     Problem: Pain  Goal: Verbalizes/displays adequate comfort level or baseline comfort level  Outcome: HH/HSPC Resolved Met  Flowsheets (Taken 8/27/2024 0800)  Verbalizes/displays adequate comfort level or baseline comfort level:   Encourage patient to

## 2024-08-28 RX ORDER — OLANZAPINE 15 MG/1
15 TABLET ORAL NIGHTLY
COMMUNITY

## 2024-08-28 RX ORDER — SELENIUM SULFIDE 2.5 MG/100ML
LOTION TOPICAL DAILY PRN
COMMUNITY

## 2024-08-28 RX ORDER — M-VIT,TX,IRON,MINS/CALC/FOLIC 27MG-0.4MG
1 TABLET ORAL DAILY
COMMUNITY

## 2024-08-28 RX ORDER — OLANZAPINE 10 MG/1
10 TABLET ORAL NIGHTLY
COMMUNITY

## 2024-08-28 RX ORDER — HALOPERIDOL 2 MG/1
2 TABLET ORAL 4 TIMES DAILY
COMMUNITY

## 2024-08-28 RX ORDER — TRAZODONE HYDROCHLORIDE 100 MG/1
100 TABLET ORAL NIGHTLY
COMMUNITY

## 2024-08-31 NOTE — DISCHARGE SUMMARY
ABDOMEN (KUB) (SINGLE AP VIEW)    Result Date: 8/26/2024  Constipation with ileus versus pseudo-obstruction.       Consults:     Consults:     Final Specialist Recommendations/Findings:   IP CONSULT TO UROLOGY  IP CONSULT TO GI  IP CONSULT TO SOCIAL WORK  IP CONSULT TO PSYCHIATRY  IP CONSULT TO VASCULAR ACCESS TEAM  IP CONSULT TO GENERAL SURGERY      Any Hospital Acquired Infections: none    Discharge Functional Status:  stable    DISCHARGE PLAN     Disposition: [facility]    Patient Instructions:   Discharge Medication List as of 8/27/2024  1:28 PM        START taking these medications    Details   polyethylene glycol (GLYCOLAX) 17 g packet Take 1 packet by mouth 2 times daily, Disp-527 g, R-1Print      bisacodyl (DULCOLAX) 10 MG suppository Place 1 suppository rectally daily, Disp-30 suppository, R-0Print      trospium (SANCTURA) 20 MG tablet Take 1 tablet by mouth 2 times daily (before meals), Disp-60 tablet, R-3Print           CONTINUE these medications which have CHANGED    Details   !! chlorproMAZINE (THORAZINE) 200 MG tablet Take 1 tablet by mouth daily, Disp-30 tablet, R-0Print      !! chlorproMAZINE (THORAZINE) 100 MG tablet Take 3 tablets by mouth at bedtime, Disp-90 tablet, R-2Print      !! chlorproMAZINE (THORAZINE) 100 MG tablet Take 1 tablet by mouth Daily with lunch, Disp-30 tablet, R-0Print      !! chlorproMAZINE (THORAZINE) 50 MG tablet Take 1 tablet by mouth daily, Disp-30 tablet, R-0Print      !! cloNIDine (CATAPRES) 0.1 MG tablet Take 1 tablet by mouth 2 times daily, Disp-60 tablet, R-3Print      !! cloNIDine (CATAPRES) 0.2 MG tablet Take 1 tablet by mouth at bedtime, Disp-60 tablet, R-3Print      lacosamide (VIMPAT) 100 MG TABS tablet Take 2 tablets by mouth 2 times daily for 60 days. Max Daily Amount: 400 mg, Disp-60 tablet, R-1Print      melatonin 5 MG TABS tablet Take 2 tablets by mouth nightly as needed (Sleep disturbance), Disp-30 tablet, R-0Print       !! - Potential duplicate  (DESYREL) 100 MG tablet Comments:   Reason for Stopping:         mirabegron (MYRBETRIQ) 50 MG TB24 Comments:   Reason for Stopping:         MINERAL OIL PO Comments:   Reason for Stopping:         OLANZapine (ZYPREXA) 10 MG tablet Comments:   Reason for Stopping:         omeprazole (PRILOSEC) 20 MG capsule Comments:   Reason for Stopping:         propranolol (INDERAL) 20 MG tablet Comments:   Reason for Stopping:         temazepam (RESTORIL) 30 MG capsule Comments:   Reason for Stopping:         Multiple Vitamins-Minerals (THERAPEUTIC MULTIVITAMIN-MINERALS) tablet Comments:   Reason for Stopping:         haloperidol (HALDOL) 1 MG tablet Comments:   Reason for Stopping:               Activity: activity as tolerated    Diet: regular diet    Follow-up:    Eliecer Denney MD  [address], [place] [address]  [phone]    Call      Keshawn Carranza MD    Call in 1 week(s)      patient's psychiatrist.    Call in 1 week(s)  Medication changes.      Patient Instructions: Follow-up with your primary care physician, psychiatrist and neurologist as advised.  Please take your medications as mentioned in the medication list.  If you develop any further episodes of seizure like shaking of body, focal weakness or numbness, worsening symptoms or any other symptoms please call 911 or visit the nearest emergency department.    Note that over 30 minutes was spent in preparing discharge papers, discussing discharge with patient, medication review, etc.      Clare Curiel MD  Internal Medicine Resident, PGY-1  [facility],  [place], [place].  8/31/2024, 6:30 PM    Please note that part of this chart was generated using voice recognition dictation software. Although every effort was made to ensure the accuracy of this automated transcription, some errors in transcription may have occurred.

## 2024-09-03 ENCOUNTER — HOSPITAL ENCOUNTER (OUTPATIENT)
Age: 29
Setting detail: SPECIMEN
Discharge: HOME | End: 2024-09-03
Payer: MEDICARE

## 2024-09-03 ENCOUNTER — OFFICE VISIT (OUTPATIENT)
Dept: UROLOGY | Age: 29
End: 2024-09-03

## 2024-09-03 DIAGNOSIS — R35.0 FREQUENCY OF URINATION: ICD-10-CM

## 2024-09-03 DIAGNOSIS — K59.00 CONSTIPATION, UNSPECIFIED CONSTIPATION TYPE: ICD-10-CM

## 2024-09-03 DIAGNOSIS — N39.42 URINARY INCONTINENCE WITHOUT SENSORY AWARENESS: Primary | ICD-10-CM

## 2024-09-03 LAB — VALPROATE SERPL-MCNC: 86 UG/ML (ref 50–125)

## 2024-09-03 PROCEDURE — 83992 ASSAY FOR PHENCYCLIDINE: CPT

## 2024-09-03 PROCEDURE — 80164 ASSAY DIPROPYLACETIC ACD TOT: CPT

## 2024-09-03 PROCEDURE — 36415 COLL VENOUS BLD VENIPUNCTURE: CPT

## 2024-09-03 RX ORDER — MIRABEGRON 50 MG/1
50 TABLET, EXTENDED RELEASE ORAL DAILY
DISCHARGE
Start: 2024-09-03

## 2024-09-03 NOTE — PROGRESS NOTES
He is alert and oriented to person.         History  6/2023 Referral for urinary issues.  He is nonverbal.  History of aggressive behavior.  Staff is helping us with the visit today.  Has incontinence.  He also at times void every 2 hours for staff.  There is times where he will urinate in inappropriate places, like under the sink.  Staff reports he is also defecating in inappropriate places.  He only has a bowel movement every 3-4 days.  If notes when he does attempt to urinate he does seem like he is straining to void.  He is on oxybutynin daily.  He voided 1 hour ago.  PVR is 439 mL.  Staff denies any evidence of hematuria.  Staff denies any self-mutilation.  Staff denies any known history of sexual assault.   Stopped oxybutynin     Started flomax and miralax     9/2023 started myrbetriq    12/2023 He is no longer voiding or defecating in public areas.  He is urinating in his bedroom on the floor by his bed. Staff does not feel like he is straining to void any longer.    Increased myrbetriq to 50mg    8/2024 admitted for generalized tonic-clonic seizure.  During hospitalization developed severe constipation and bowel obstruction.  Due to constipation and bowel obstruction he did develop urinary retention.  Frias placed for greater than side 800 mL.    Today  Here today at [facility] to follow-up for inappropriate voiding and constipation.  He was recently admitted at [facility] for generalized tonic-clonic seizure.  During hospitalization developed severe constipation and bowel obstruction.  Due to constipation and bowel obstruction he did develop urinary retention.  Frias placed for greater than side 800 mL. Frias removed at discharge. He has been voiding without difficulty. There has been no hematuria. Staff is assisting with visit.  He is nonverbal. He is having a daily bowel movement. Random bladder scan is low.    Plan  Resume Myrbetriq    Continue Flomax    Continue MiraLAX    Follow-up in 3 months during facility

## 2024-09-04 ENCOUNTER — TELEPHONE (OUTPATIENT)
Dept: UROLOGY | Age: 29
End: 2024-09-04

## 2024-09-04 ENCOUNTER — HOSPITAL ENCOUNTER (OUTPATIENT)
Age: 29
Setting detail: SPECIMEN
Discharge: HOME | End: 2024-09-04

## 2024-09-04 NOTE — TELEPHONE ENCOUNTER
Writer called [facility] and spoke with Veronika the Nurse, writer provided her with the providers response, she did verbalize understanding.

## 2024-09-04 NOTE — TELEPHONE ENCOUNTER
Joie from [facility] called this afternoon to get clarification with the patient's medication. She stated he has been on Myrbetriq ER 50 mg since December. Joie said the new prescription is just Myrbetriq 50 mg. She wanted to know if this correct, is there a difference between the two?      Please advise

## 2024-09-06 ENCOUNTER — HOSPITAL ENCOUNTER (OUTPATIENT)
Age: 29
Setting detail: SPECIMEN
Discharge: HOME | End: 2024-09-06

## 2024-09-07 LAB — PCP: <10 NG/ML

## 2024-09-09 ENCOUNTER — HOSPITAL ENCOUNTER (OUTPATIENT)
Age: 29
Setting detail: SPECIMEN
Discharge: HOME | End: 2024-09-09

## 2024-09-10 ENCOUNTER — HOSPITAL ENCOUNTER (OUTPATIENT)
Age: 29
Setting detail: SPECIMEN
Discharge: HOME | End: 2024-09-10
Payer: MEDICARE

## 2024-09-10 PROCEDURE — 83992 ASSAY FOR PHENCYCLIDINE: CPT

## 2024-09-13 ENCOUNTER — HOSPITAL ENCOUNTER (OUTPATIENT)
Age: 29
Setting detail: SPECIMEN
Discharge: HOME | End: 2024-09-13

## 2024-09-13 LAB — PCP, CONFIRMATION, URINE: <10 NG/ML

## 2024-09-16 ENCOUNTER — HOSPITAL ENCOUNTER (OUTPATIENT)
Age: 29
Setting detail: SPECIMEN
Discharge: HOME | End: 2024-09-16
Payer: MEDICARE

## 2024-09-16 LAB
BACTERIA URNS QL MICRO: ABNORMAL
BILIRUB UR QL STRIP: ABNORMAL
CHARACTER UR: ABNORMAL
CHARACTER UR: ABNORMAL
CLARITY UR: CLEAR
COLOR UR: YELLOW
EPI CELLS #/AREA URNS HPF: ABNORMAL /HPF (ref 0–5)
GLUCOSE UR STRIP-MCNC: NEGATIVE MG/DL
HGB UR QL STRIP.AUTO: NEGATIVE
KETONES UR STRIP-MCNC: ABNORMAL MG/DL
LEUKOCYTE ESTERASE UR QL STRIP: NEGATIVE
MUCOUS THREADS URNS QL MICRO: ABNORMAL
NITRITE UR QL STRIP: NEGATIVE
PH UR STRIP: 6.5 [PH] (ref 5–9)
PROT UR STRIP-MCNC: NEGATIVE MG/DL
RBC #/AREA URNS HPF: ABNORMAL /HPF (ref 0–2)
SP GR UR STRIP: >1.03 (ref 1.01–1.02)
UROBILINOGEN UR STRIP-ACNC: ABNORMAL EU/DL (ref 0–1)
WBC #/AREA URNS HPF: ABNORMAL /HPF (ref 0–5)

## 2024-09-16 PROCEDURE — 83992 ASSAY FOR PHENCYCLIDINE: CPT

## 2024-09-16 PROCEDURE — 81001 URINALYSIS AUTO W/SCOPE: CPT

## 2024-09-20 ENCOUNTER — HOSPITAL ENCOUNTER (OUTPATIENT)
Age: 29
Setting detail: SPECIMEN
Discharge: HOME | End: 2024-09-20
Payer: MEDICARE

## 2024-09-20 LAB — PCP, CONFIRMATION, URINE: <10 NG/ML

## 2024-09-20 PROCEDURE — 83992 ASSAY FOR PHENCYCLIDINE: CPT

## 2024-09-24 LAB — PCP, CONFIRMATION, URINE: <10 NG/ML

## 2024-09-30 ENCOUNTER — TELEPHONE (OUTPATIENT)
Dept: NEUROLOGY | Age: 29
End: 2024-09-30

## 2024-09-30 ENCOUNTER — OFFICE VISIT (OUTPATIENT)
Dept: NEUROLOGY | Age: 29
End: 2024-09-30
Payer: MEDICARE

## 2024-09-30 VITALS — HEART RATE: 111 BPM | DIASTOLIC BLOOD PRESSURE: 93 MMHG | SYSTOLIC BLOOD PRESSURE: 128 MMHG

## 2024-09-30 DIAGNOSIS — G40.909 SEIZURE DISORDER (HCC): Primary | ICD-10-CM

## 2024-09-30 PROCEDURE — 1036F TOBACCO NON-USER: CPT | Performed by: PSYCHIATRY & NEUROLOGY

## 2024-09-30 PROCEDURE — 99214 OFFICE O/P EST MOD 30 MIN: CPT | Performed by: PSYCHIATRY & NEUROLOGY

## 2024-09-30 PROCEDURE — G8427 DOCREV CUR MEDS BY ELIG CLIN: HCPCS | Performed by: PSYCHIATRY & NEUROLOGY

## 2024-09-30 PROCEDURE — G8417 CALC BMI ABV UP PARAM F/U: HCPCS | Performed by: PSYCHIATRY & NEUROLOGY

## 2024-09-30 RX ORDER — DIVALPROEX SODIUM 500 MG/1
TABLET, DELAYED RELEASE ORAL
COMMUNITY
Start: 2024-08-27

## 2024-09-30 RX ORDER — DIVALPROEX SODIUM 250 MG/1
TABLET, DELAYED RELEASE ORAL
COMMUNITY
Start: 2024-08-27

## 2024-09-30 NOTE — TELEPHONE ENCOUNTER
Received a call from [facility] asking for his OV note to be faxed to them. I e-faxed it to them at [phone].

## 2024-10-01 ENCOUNTER — HOSPITAL ENCOUNTER (OUTPATIENT)
Age: 29
Setting detail: SPECIMEN
Discharge: HOME | End: 2024-10-01
Payer: MEDICARE

## 2024-10-01 DIAGNOSIS — G40.909 SEIZURE DISORDER (HCC): ICD-10-CM

## 2024-10-01 LAB
ALBUMIN SERPL-MCNC: 3.9 G/DL (ref 3.5–5.2)
ALBUMIN/GLOB SERPL: 1.7 {RATIO} (ref 1–2.5)
ALP SERPL-CCNC: 118 U/L (ref 40–129)
ALT SERPL-CCNC: 26 U/L (ref 10–50)
ANION GAP SERPL CALCULATED.3IONS-SCNC: 11 MMOL/L (ref 9–16)
AST SERPL-CCNC: 29 U/L (ref 10–50)
BILIRUB SERPL-MCNC: 0.9 MG/DL (ref 0–1.2)
BUN SERPL-MCNC: 16 MG/DL (ref 6–20)
BUN/CREAT SERPL: 20 (ref 9–20)
CALCIUM SERPL-MCNC: 8.8 MG/DL (ref 8.6–10.4)
CHLORIDE SERPL-SCNC: 104 MMOL/L (ref 98–107)
CO2 SERPL-SCNC: 26 MMOL/L (ref 20–31)
CREAT SERPL-MCNC: 0.8 MG/DL (ref 0.7–1.2)
ERYTHROCYTE [DISTWIDTH] IN BLOOD BY AUTOMATED COUNT: 15.9 % (ref 11.8–14.4)
GFR, ESTIMATED: >90 ML/MIN/1.73M2
GLUCOSE SERPL-MCNC: 100 MG/DL (ref 74–99)
HCT VFR BLD AUTO: 36 % (ref 40.7–50.3)
HGB BLD-MCNC: 12.4 G/DL (ref 13–17)
MCH RBC QN AUTO: 29.1 PG (ref 25.2–33.5)
MCHC RBC AUTO-ENTMCNC: 34.4 G/DL (ref 28.4–34.8)
MCV RBC AUTO: 84.5 FL (ref 82.6–102.9)
NRBC BLD-RTO: 0 PER 100 WBC
PLATELET # BLD AUTO: 105 K/UL (ref 138–453)
PMV BLD AUTO: 8.9 FL (ref 8.1–13.5)
POTASSIUM SERPL-SCNC: 3.6 MMOL/L (ref 3.7–5.3)
PROT SERPL-MCNC: 6.2 G/DL (ref 6.6–8.7)
RBC # BLD AUTO: 4.26 M/UL (ref 4.21–5.77)
SODIUM SERPL-SCNC: 141 MMOL/L (ref 136–145)
WBC OTHER # BLD: 3.9 K/UL (ref 3.5–11.3)

## 2024-10-01 PROCEDURE — P9604 ONE-WAY ALLOW PRORATED TRIP: HCPCS

## 2024-10-01 PROCEDURE — 85027 COMPLETE CBC AUTOMATED: CPT

## 2024-10-01 PROCEDURE — 80053 COMPREHEN METABOLIC PANEL: CPT

## 2024-10-01 PROCEDURE — 36415 COLL VENOUS BLD VENIPUNCTURE: CPT

## 2024-10-03 ENCOUNTER — HOSPITAL ENCOUNTER (OUTPATIENT)
Age: 29
Setting detail: SPECIMEN
Discharge: HOME | End: 2024-10-03
Payer: MEDICARE

## 2024-10-03 LAB
BILIRUB UR QL STRIP: ABNORMAL
CLARITY UR: CLEAR
COLOR UR: YELLOW
EPI CELLS #/AREA URNS HPF: NORMAL /HPF (ref 0–5)
GLUCOSE UR STRIP-MCNC: NEGATIVE MG/DL
HGB UR QL STRIP.AUTO: NEGATIVE
KETONES UR STRIP-MCNC: ABNORMAL MG/DL
LEUKOCYTE ESTERASE UR QL STRIP: NEGATIVE
NITRITE UR QL STRIP: NEGATIVE
PH UR STRIP: 7 [PH] (ref 5–9)
PROT UR STRIP-MCNC: NEGATIVE MG/DL
RBC #/AREA URNS HPF: NORMAL /HPF (ref 0–2)
SP GR UR STRIP: 1.01 (ref 1.01–1.02)
UROBILINOGEN UR STRIP-ACNC: ABNORMAL EU/DL (ref 0–1)
WBC #/AREA URNS HPF: NORMAL /HPF (ref 0–5)

## 2024-10-03 PROCEDURE — 83992 ASSAY FOR PHENCYCLIDINE: CPT

## 2024-10-03 PROCEDURE — 81001 URINALYSIS AUTO W/SCOPE: CPT

## 2024-10-08 ENCOUNTER — HOSPITAL ENCOUNTER (OUTPATIENT)
Age: 29
Setting detail: SPECIMEN
Discharge: HOME | End: 2024-10-08
Payer: MEDICARE

## 2024-10-08 LAB
ALBUMIN SERPL-MCNC: 3.8 G/DL (ref 3.5–5.2)
ALBUMIN/GLOB SERPL: 1.7 {RATIO} (ref 1–2.5)
ALP SERPL-CCNC: 105 U/L (ref 40–129)
ALT SERPL-CCNC: 19 U/L (ref 10–50)
ANION GAP SERPL CALCULATED.3IONS-SCNC: 9 MMOL/L (ref 9–16)
AST SERPL-CCNC: 26 U/L (ref 10–50)
BASOPHILS # BLD: <0.03 K/UL (ref 0–0.2)
BASOPHILS NFR BLD: 0 % (ref 0–2)
BILIRUB SERPL-MCNC: 0.9 MG/DL (ref 0–1.2)
BUN SERPL-MCNC: 12 MG/DL (ref 6–20)
BUN/CREAT SERPL: 15 (ref 9–20)
CALCIUM SERPL-MCNC: 9 MG/DL (ref 8.6–10.4)
CHLORIDE SERPL-SCNC: 103 MMOL/L (ref 98–107)
CO2 SERPL-SCNC: 27 MMOL/L (ref 20–31)
CREAT SERPL-MCNC: 0.8 MG/DL (ref 0.7–1.2)
EOSINOPHIL # BLD: <0.03 K/UL (ref 0–0.44)
EOSINOPHILS RELATIVE PERCENT: 0 % (ref 1–4)
ERYTHROCYTE [DISTWIDTH] IN BLOOD BY AUTOMATED COUNT: 16.3 % (ref 11.8–14.4)
GFR, ESTIMATED: >90 ML/MIN/1.73M2
GLUCOSE SERPL-MCNC: 93 MG/DL (ref 74–99)
HCT VFR BLD AUTO: 33.9 % (ref 40.7–50.3)
HGB BLD-MCNC: 11.7 G/DL (ref 13–17)
IMM GRANULOCYTES # BLD AUTO: <0.03 K/UL (ref 0–0.3)
IMM GRANULOCYTES NFR BLD: 0 %
LYMPHOCYTES NFR BLD: 1.11 K/UL (ref 1.1–3.7)
LYMPHOCYTES RELATIVE PERCENT: 30 % (ref 24–43)
MCH RBC QN AUTO: 29.3 PG (ref 25.2–33.5)
MCHC RBC AUTO-ENTMCNC: 34.5 G/DL (ref 28.4–34.8)
MCV RBC AUTO: 85 FL (ref 82.6–102.9)
MONOCYTES NFR BLD: 0.67 K/UL (ref 0.1–1.2)
MONOCYTES NFR BLD: 18 % (ref 3–12)
NEUTROPHILS NFR BLD: 52 % (ref 36–65)
NEUTS SEG NFR BLD: 1.93 K/UL (ref 1.5–8.1)
NRBC BLD-RTO: 0 PER 100 WBC
PCP, CONFIRMATION, URINE: <10 NG/ML
PLATELET # BLD AUTO: 119 K/UL (ref 138–453)
PMV BLD AUTO: 8.5 FL (ref 8.1–13.5)
POTASSIUM SERPL-SCNC: 4.1 MMOL/L (ref 3.7–5.3)
PROT SERPL-MCNC: 6 G/DL (ref 6.6–8.7)
RBC # BLD AUTO: 3.99 M/UL (ref 4.21–5.77)
SODIUM SERPL-SCNC: 139 MMOL/L (ref 136–145)
VALPROATE SERPL-MCNC: 72 UG/ML (ref 50–125)
WBC OTHER # BLD: 3.7 K/UL (ref 3.5–11.3)

## 2024-10-08 PROCEDURE — 80235 DRUG ASSAY LACOSAMIDE: CPT

## 2024-10-08 PROCEDURE — 85025 COMPLETE CBC W/AUTO DIFF WBC: CPT

## 2024-10-08 PROCEDURE — 80053 COMPREHEN METABOLIC PANEL: CPT

## 2024-10-08 PROCEDURE — P9604 ONE-WAY ALLOW PRORATED TRIP: HCPCS

## 2024-10-08 PROCEDURE — 36415 COLL VENOUS BLD VENIPUNCTURE: CPT

## 2024-10-08 PROCEDURE — 80164 ASSAY DIPROPYLACETIC ACD TOT: CPT

## 2024-10-11 LAB — LACOSAMIDE: 11.4 UG/ML (ref 1–10)

## 2024-10-14 ENCOUNTER — HOSPITAL ENCOUNTER (OUTPATIENT)
Age: 29
Setting detail: SPECIMEN
Discharge: HOME | End: 2024-10-14
Payer: MEDICARE

## 2024-10-14 LAB
BASOPHILS # BLD: <0.03 K/UL (ref 0–0.2)
BASOPHILS NFR BLD: 0 % (ref 0–2)
EOSINOPHIL # BLD: <0.03 K/UL (ref 0–0.44)
EOSINOPHILS RELATIVE PERCENT: 0 % (ref 1–4)
ERYTHROCYTE [DISTWIDTH] IN BLOOD BY AUTOMATED COUNT: 15.9 % (ref 11.8–14.4)
HCT VFR BLD AUTO: 38.6 % (ref 40.7–50.3)
HGB BLD-MCNC: 12.6 G/DL (ref 13–17)
IMM GRANULOCYTES # BLD AUTO: <0.03 K/UL (ref 0–0.3)
IMM GRANULOCYTES NFR BLD: 0 %
LYMPHOCYTES NFR BLD: 1.03 K/UL (ref 1.1–3.7)
LYMPHOCYTES RELATIVE PERCENT: 34 % (ref 24–43)
MCH RBC QN AUTO: 28.7 PG (ref 25.2–33.5)
MCHC RBC AUTO-ENTMCNC: 32.6 G/DL (ref 28.4–34.8)
MCV RBC AUTO: 87.9 FL (ref 82.6–102.9)
MONOCYTES NFR BLD: 0.55 K/UL (ref 0.1–1.2)
MONOCYTES NFR BLD: 18 % (ref 3–12)
NEUTROPHILS NFR BLD: 48 % (ref 36–65)
NEUTS SEG NFR BLD: 1.4 K/UL (ref 1.5–8.1)
NRBC BLD-RTO: 0 PER 100 WBC
PLATELET # BLD AUTO: 122 K/UL (ref 138–453)
PMV BLD AUTO: 8.7 FL (ref 8.1–13.5)
RBC # BLD AUTO: 4.39 M/UL (ref 4.21–5.77)
WBC OTHER # BLD: 3 K/UL (ref 3.5–11.3)

## 2024-10-14 PROCEDURE — 85025 COMPLETE CBC W/AUTO DIFF WBC: CPT

## 2024-10-16 ENCOUNTER — HOSPITAL ENCOUNTER (OUTPATIENT)
Age: 29
Setting detail: SPECIMEN
Discharge: HOME | End: 2024-10-16
Payer: MEDICARE

## 2024-10-16 LAB
BASOPHILS # BLD: <0.03 K/UL (ref 0–0.2)
BASOPHILS NFR BLD: 0 % (ref 0–2)
EOSINOPHIL # BLD: <0.03 K/UL (ref 0–0.44)
EOSINOPHILS RELATIVE PERCENT: 0 % (ref 1–4)
ERYTHROCYTE [DISTWIDTH] IN BLOOD BY AUTOMATED COUNT: 16.1 % (ref 11.8–14.4)
HCT VFR BLD AUTO: 36.4 % (ref 40.7–50.3)
HGB BLD-MCNC: 12.1 G/DL (ref 13–17)
IMM GRANULOCYTES # BLD AUTO: <0.03 K/UL (ref 0–0.3)
IMM GRANULOCYTES NFR BLD: 0 %
LYMPHOCYTES NFR BLD: 1.23 K/UL (ref 1.1–3.7)
LYMPHOCYTES RELATIVE PERCENT: 37 % (ref 24–43)
MCH RBC QN AUTO: 29.2 PG (ref 25.2–33.5)
MCHC RBC AUTO-ENTMCNC: 33.2 G/DL (ref 28.4–34.8)
MCV RBC AUTO: 87.9 FL (ref 82.6–102.9)
MONOCYTES NFR BLD: 0.97 K/UL (ref 0.1–1.2)
MONOCYTES NFR BLD: 29 % (ref 3–12)
NEUTROPHILS NFR BLD: 34 % (ref 36–65)
NEUTS SEG NFR BLD: 1.15 K/UL (ref 1.5–8.1)
NRBC BLD-RTO: 0 PER 100 WBC
PLATELET # BLD AUTO: 118 K/UL (ref 138–453)
PMV BLD AUTO: 9 FL (ref 8.1–13.5)
RBC # BLD AUTO: 4.14 M/UL (ref 4.21–5.77)
WBC OTHER # BLD: 3.4 K/UL (ref 3.5–11.3)

## 2024-10-16 PROCEDURE — 85025 COMPLETE CBC W/AUTO DIFF WBC: CPT

## 2024-10-17 ENCOUNTER — HOSPITAL ENCOUNTER (OUTPATIENT)
Age: 29
Setting detail: SPECIMEN
Discharge: HOME | End: 2024-10-17

## 2024-10-18 ENCOUNTER — HOSPITAL ENCOUNTER (OUTPATIENT)
Age: 29
Setting detail: SPECIMEN
Discharge: HOME | End: 2024-10-18
Payer: MEDICARE

## 2024-10-18 LAB
BASOPHILS # BLD: <0.03 K/UL (ref 0–0.2)
BASOPHILS NFR BLD: 0 % (ref 0–2)
EOSINOPHIL # BLD: <0.03 K/UL (ref 0–0.44)
EOSINOPHILS RELATIVE PERCENT: 0 % (ref 1–4)
ERYTHROCYTE [DISTWIDTH] IN BLOOD BY AUTOMATED COUNT: 15.9 % (ref 11.8–14.4)
HCT VFR BLD AUTO: 35.4 % (ref 40.7–50.3)
HGB BLD-MCNC: 11.9 G/DL (ref 13–17)
IMM GRANULOCYTES # BLD AUTO: <0.03 K/UL (ref 0–0.3)
IMM GRANULOCYTES NFR BLD: 0 %
LYMPHOCYTES NFR BLD: 1.14 K/UL (ref 1.1–3.7)
LYMPHOCYTES RELATIVE PERCENT: 36 % (ref 24–43)
MCH RBC QN AUTO: 29.7 PG (ref 25.2–33.5)
MCHC RBC AUTO-ENTMCNC: 33.6 G/DL (ref 28.4–34.8)
MCV RBC AUTO: 88.3 FL (ref 82.6–102.9)
MONOCYTES NFR BLD: 0.75 K/UL (ref 0.1–1.2)
MONOCYTES NFR BLD: 24 % (ref 3–12)
NEUTROPHILS NFR BLD: 40 % (ref 36–65)
NEUTS SEG NFR BLD: 1.29 K/UL (ref 1.5–8.1)
NRBC BLD-RTO: 0 PER 100 WBC
PLATELET # BLD AUTO: 121 K/UL (ref 138–453)
PMV BLD AUTO: 8.7 FL (ref 8.1–13.5)
RBC # BLD AUTO: 4.01 M/UL (ref 4.21–5.77)
WBC OTHER # BLD: 3.2 K/UL (ref 3.5–11.3)

## 2024-10-18 PROCEDURE — 36415 COLL VENOUS BLD VENIPUNCTURE: CPT

## 2024-10-18 PROCEDURE — 85025 COMPLETE CBC W/AUTO DIFF WBC: CPT

## 2024-10-21 ENCOUNTER — HOSPITAL ENCOUNTER (OUTPATIENT)
Age: 29
Setting detail: SPECIMEN
Discharge: HOME | End: 2024-10-21
Payer: MEDICARE

## 2024-10-21 LAB
BASOPHILS # BLD: 0 K/UL (ref 0–0.2)
BASOPHILS NFR BLD: 0 % (ref 0–2)
EOSINOPHIL # BLD: 0 K/UL (ref 0–0.44)
EOSINOPHILS RELATIVE PERCENT: 0 % (ref 1–4)
ERYTHROCYTE [DISTWIDTH] IN BLOOD BY AUTOMATED COUNT: 15.3 % (ref 11.8–14.4)
HCT VFR BLD AUTO: 35.9 % (ref 40.7–50.3)
HGB BLD-MCNC: 12 G/DL (ref 13–17)
IMM GRANULOCYTES # BLD AUTO: 0 K/UL (ref 0–0.3)
IMM GRANULOCYTES NFR BLD: 0 %
LYMPHOCYTES NFR BLD: 1.33 K/UL (ref 1.1–3.7)
LYMPHOCYTES RELATIVE PERCENT: 51 % (ref 24–43)
MCH RBC QN AUTO: 29 PG (ref 25.2–33.5)
MCHC RBC AUTO-ENTMCNC: 33.4 G/DL (ref 28.4–34.8)
MCV RBC AUTO: 86.7 FL (ref 82.6–102.9)
MONOCYTES NFR BLD: 0.65 K/UL (ref 0.1–1.2)
MONOCYTES NFR BLD: 25 % (ref 3–12)
MORPHOLOGY: NORMAL
NEUTROPHILS NFR BLD: 24 % (ref 36–65)
NEUTS SEG NFR BLD: 0.62 K/UL (ref 1.5–8.1)
NRBC BLD-RTO: 0 PER 100 WBC
PLATELET # BLD AUTO: 124 K/UL (ref 138–453)
PMV BLD AUTO: 9.9 FL (ref 8.1–13.5)
RBC # BLD AUTO: 4.14 M/UL (ref 4.21–5.77)
WBC OTHER # BLD: 2.6 K/UL (ref 3.5–11.3)

## 2024-10-21 PROCEDURE — 85025 COMPLETE CBC W/AUTO DIFF WBC: CPT

## 2024-10-23 ENCOUNTER — HOSPITAL ENCOUNTER (OUTPATIENT)
Age: 29
Setting detail: SPECIMEN
Discharge: HOME | End: 2024-10-23
Payer: MEDICARE

## 2024-10-23 LAB
BASOPHILS # BLD: 0 K/UL (ref 0–0.2)
BASOPHILS NFR BLD: 0 % (ref 0–2)
EOSINOPHIL # BLD: 0 K/UL (ref 0–0.44)
EOSINOPHILS RELATIVE PERCENT: 0 % (ref 1–4)
ERYTHROCYTE [DISTWIDTH] IN BLOOD BY AUTOMATED COUNT: 15.5 % (ref 11.8–14.4)
HCT VFR BLD AUTO: 38.7 % (ref 40.7–50.3)
HGB BLD-MCNC: 13 G/DL (ref 13–17)
IMM GRANULOCYTES # BLD AUTO: 0 K/UL (ref 0–0.3)
IMM GRANULOCYTES NFR BLD: 0 %
LYMPHOCYTES NFR BLD: 1.45 K/UL (ref 1.1–3.7)
LYMPHOCYTES RELATIVE PERCENT: 52 % (ref 24–43)
MCH RBC QN AUTO: 29.5 PG (ref 25.2–33.5)
MCHC RBC AUTO-ENTMCNC: 33.6 G/DL (ref 28.4–34.8)
MCV RBC AUTO: 88 FL (ref 82.6–102.9)
MONOCYTES NFR BLD: 0.59 K/UL (ref 0.1–1.2)
MONOCYTES NFR BLD: 21 % (ref 3–12)
MORPHOLOGY: NORMAL
NEUTROPHILS NFR BLD: 27 % (ref 36–65)
NEUTS SEG NFR BLD: 0.76 K/UL (ref 1.5–8.1)
NRBC BLD-RTO: 0 PER 100 WBC
PLATELET # BLD AUTO: 136 K/UL (ref 138–453)
PMV BLD AUTO: 9.5 FL (ref 8.1–13.5)
RBC # BLD AUTO: 4.4 M/UL (ref 4.21–5.77)
WBC OTHER # BLD: 2.8 K/UL (ref 3.5–11.3)

## 2024-10-23 PROCEDURE — 85025 COMPLETE CBC W/AUTO DIFF WBC: CPT

## 2024-10-25 ENCOUNTER — HOSPITAL ENCOUNTER (OUTPATIENT)
Age: 29
Setting detail: SPECIMEN
Discharge: HOME | End: 2024-10-25
Payer: MEDICARE

## 2024-10-25 LAB
BASOPHILS # BLD: 0 K/UL (ref 0–0.2)
BASOPHILS NFR BLD: 0 % (ref 0–2)
EOSINOPHIL # BLD: 0 K/UL (ref 0–0.44)
EOSINOPHILS RELATIVE PERCENT: 0 % (ref 1–4)
ERYTHROCYTE [DISTWIDTH] IN BLOOD BY AUTOMATED COUNT: 15.2 % (ref 11.8–14.4)
HCT VFR BLD AUTO: 37.9 % (ref 40.7–50.3)
HGB BLD-MCNC: 12.4 G/DL (ref 13–17)
IMM GRANULOCYTES # BLD AUTO: 0 K/UL (ref 0–0.3)
IMM GRANULOCYTES NFR BLD: 0 %
LYMPHOCYTES NFR BLD: 1.47 K/UL (ref 1.1–3.7)
LYMPHOCYTES RELATIVE PERCENT: 55 % (ref 24–43)
MCH RBC QN AUTO: 29.2 PG (ref 25.2–33.5)
MCHC RBC AUTO-ENTMCNC: 32.7 G/DL (ref 28.4–34.8)
MCV RBC AUTO: 89.2 FL (ref 82.6–102.9)
MONOCYTES NFR BLD: 0.56 K/UL (ref 0.1–1.2)
MONOCYTES NFR BLD: 21 % (ref 3–12)
MORPHOLOGY: NORMAL
NEUTROPHILS NFR BLD: 24 % (ref 36–65)
NEUTS SEG NFR BLD: 0.64 K/UL (ref 1.5–8.1)
NRBC BLD-RTO: 0 PER 100 WBC
NUCLEATED RED BLOOD CELLS: 1 PER 100 WBC (ref 0–5)
PLATELET # BLD AUTO: 129 K/UL (ref 138–453)
PMV BLD AUTO: 9.4 FL (ref 8.1–13.5)
RBC # BLD AUTO: 4.25 M/UL (ref 4.21–5.77)
WBC OTHER # BLD: 2.7 K/UL (ref 3.5–11.3)

## 2024-10-25 PROCEDURE — 85025 COMPLETE CBC W/AUTO DIFF WBC: CPT

## 2024-10-25 PROCEDURE — 36415 COLL VENOUS BLD VENIPUNCTURE: CPT

## 2024-10-29 ENCOUNTER — HOSPITAL ENCOUNTER (OUTPATIENT)
Age: 29
Setting detail: SPECIMEN
Discharge: HOME | End: 2024-10-29
Payer: MEDICARE

## 2024-10-29 LAB
BASOPHILS # BLD: 0 K/UL (ref 0–0.2)
BASOPHILS NFR BLD: 0 % (ref 0–2)
EOSINOPHIL # BLD: 0 K/UL (ref 0–0.44)
EOSINOPHILS RELATIVE PERCENT: 0 % (ref 1–4)
ERYTHROCYTE [DISTWIDTH] IN BLOOD BY AUTOMATED COUNT: 15 % (ref 11.8–14.4)
HCT VFR BLD AUTO: 38.2 % (ref 40.7–50.3)
HGB BLD-MCNC: 12.3 G/DL (ref 13–17)
IMM GRANULOCYTES # BLD AUTO: 0 K/UL (ref 0–0.3)
IMM GRANULOCYTES NFR BLD: 0 %
LYMPHOCYTES NFR BLD: 1.53 K/UL (ref 1.1–3.7)
LYMPHOCYTES RELATIVE PERCENT: 57 % (ref 24–43)
MCH RBC QN AUTO: 29.1 PG (ref 25.2–33.5)
MCHC RBC AUTO-ENTMCNC: 32.2 G/DL (ref 28.4–34.8)
MCV RBC AUTO: 90.3 FL (ref 82.6–102.9)
MONOCYTES NFR BLD: 0.49 K/UL (ref 0.1–1.2)
MONOCYTES NFR BLD: 18 % (ref 3–12)
MORPHOLOGY: NORMAL
NEUTROPHILS NFR BLD: 25 % (ref 36–65)
NEUTS SEG NFR BLD: 0.68 K/UL (ref 1.5–8.1)
NRBC BLD-RTO: 0 PER 100 WBC
PLATELET # BLD AUTO: 124 K/UL (ref 138–453)
PMV BLD AUTO: 9.2 FL (ref 8.1–13.5)
RBC # BLD AUTO: 4.23 M/UL (ref 4.21–5.77)
WBC OTHER # BLD: 2.7 K/UL (ref 3.5–11.3)

## 2024-10-29 PROCEDURE — 36415 COLL VENOUS BLD VENIPUNCTURE: CPT

## 2024-10-29 PROCEDURE — P9604 ONE-WAY ALLOW PRORATED TRIP: HCPCS

## 2024-10-29 PROCEDURE — 85025 COMPLETE CBC W/AUTO DIFF WBC: CPT

## 2024-11-01 ENCOUNTER — HOSPITAL ENCOUNTER (OUTPATIENT)
Age: 29
Setting detail: SPECIMEN
Discharge: HOME | End: 2024-11-01
Payer: MEDICARE

## 2024-11-01 LAB
BASOPHILS # BLD: 0 K/UL (ref 0–0.2)
BASOPHILS NFR BLD: 0 % (ref 0–2)
EOSINOPHIL # BLD: 0 K/UL (ref 0–0.44)
EOSINOPHILS RELATIVE PERCENT: 0 % (ref 1–4)
ERYTHROCYTE [DISTWIDTH] IN BLOOD BY AUTOMATED COUNT: 14.6 % (ref 11.8–14.4)
HCT VFR BLD AUTO: 36.6 % (ref 40.7–50.3)
HGB BLD-MCNC: 12.2 G/DL (ref 13–17)
IMM GRANULOCYTES # BLD AUTO: 0 K/UL (ref 0–0.3)
IMM GRANULOCYTES NFR BLD: 0 %
LYMPHOCYTES NFR BLD: 1.18 K/UL (ref 1.1–3.7)
LYMPHOCYTES RELATIVE PERCENT: 49 % (ref 24–43)
MCH RBC QN AUTO: 28.8 PG (ref 25.2–33.5)
MCHC RBC AUTO-ENTMCNC: 33.3 G/DL (ref 28.4–34.8)
MCV RBC AUTO: 86.3 FL (ref 82.6–102.9)
MONOCYTES NFR BLD: 0.48 K/UL (ref 0.1–1.2)
MONOCYTES NFR BLD: 20 % (ref 3–12)
MORPHOLOGY: NORMAL
NEUTROPHILS NFR BLD: 31 % (ref 36–65)
NEUTS SEG NFR BLD: 0.74 K/UL (ref 1.5–8.1)
NRBC BLD-RTO: 0 PER 100 WBC
PLATELET # BLD AUTO: 124 K/UL (ref 138–453)
PMV BLD AUTO: 9.5 FL (ref 8.1–13.5)
RBC # BLD AUTO: 4.24 M/UL (ref 4.21–5.77)
WBC OTHER # BLD: 2.4 K/UL (ref 3.5–11.3)

## 2024-11-01 PROCEDURE — 36415 COLL VENOUS BLD VENIPUNCTURE: CPT

## 2024-11-01 PROCEDURE — 85025 COMPLETE CBC W/AUTO DIFF WBC: CPT

## 2024-11-05 ENCOUNTER — HOSPITAL ENCOUNTER (OUTPATIENT)
Age: 29
Setting detail: SPECIMEN
Discharge: HOME | End: 2024-11-05
Payer: MEDICARE

## 2024-11-05 LAB
BASOPHILS # BLD: 0 K/UL (ref 0–0.2)
BASOPHILS NFR BLD: 0 % (ref 0–2)
EOSINOPHIL # BLD: 0 K/UL (ref 0–0.44)
EOSINOPHILS RELATIVE PERCENT: 0 % (ref 1–4)
ERYTHROCYTE [DISTWIDTH] IN BLOOD BY AUTOMATED COUNT: 14 % (ref 11.8–14.4)
HCT VFR BLD AUTO: 39.6 % (ref 40.7–50.3)
HGB BLD-MCNC: 13 G/DL (ref 13–17)
IMM GRANULOCYTES # BLD AUTO: 0 K/UL (ref 0–0.3)
IMM GRANULOCYTES NFR BLD: 0 %
LYMPHOCYTES NFR BLD: 1.13 K/UL (ref 1.1–3.7)
LYMPHOCYTES RELATIVE PERCENT: 49 % (ref 24–43)
MCH RBC QN AUTO: 28.4 PG (ref 25.2–33.5)
MCHC RBC AUTO-ENTMCNC: 32.8 G/DL (ref 28.4–34.8)
MCV RBC AUTO: 86.7 FL (ref 82.6–102.9)
MONOCYTES NFR BLD: 0.41 K/UL (ref 0.1–1.2)
MONOCYTES NFR BLD: 18 % (ref 3–12)
MORPHOLOGY: NORMAL
NEUTROPHILS NFR BLD: 33 % (ref 36–65)
NEUTS SEG NFR BLD: 0.76 K/UL (ref 1.5–8.1)
NRBC BLD-RTO: 0 PER 100 WBC
PLATELET # BLD AUTO: 127 K/UL (ref 138–453)
PMV BLD AUTO: 9.3 FL (ref 8.1–13.5)
RBC # BLD AUTO: 4.57 M/UL (ref 4.21–5.77)
WBC OTHER # BLD: 2.3 K/UL (ref 3.5–11.3)

## 2024-11-05 PROCEDURE — P9604 ONE-WAY ALLOW PRORATED TRIP: HCPCS

## 2024-11-05 PROCEDURE — 85025 COMPLETE CBC W/AUTO DIFF WBC: CPT

## 2024-11-05 PROCEDURE — 36415 COLL VENOUS BLD VENIPUNCTURE: CPT

## 2024-11-08 ENCOUNTER — HOSPITAL ENCOUNTER (OUTPATIENT)
Age: 29
Setting detail: SPECIMEN
Discharge: HOME | End: 2024-11-08
Payer: MEDICARE

## 2024-11-08 LAB
BASOPHILS # BLD: 0 K/UL (ref 0–0.2)
BASOPHILS NFR BLD: 0 % (ref 0–2)
EOSINOPHIL # BLD: 0 K/UL (ref 0–0.44)
EOSINOPHILS RELATIVE PERCENT: 0 % (ref 1–4)
ERYTHROCYTE [DISTWIDTH] IN BLOOD BY AUTOMATED COUNT: 13.8 % (ref 11.8–14.4)
HCT VFR BLD AUTO: 39.4 % (ref 40.7–50.3)
HGB BLD-MCNC: 12.9 G/DL (ref 13–17)
IMM GRANULOCYTES # BLD AUTO: 0 K/UL (ref 0–0.3)
IMM GRANULOCYTES NFR BLD: 0 %
LYMPHOCYTES NFR BLD: 1.35 K/UL (ref 1.1–3.7)
LYMPHOCYTES RELATIVE PERCENT: 52 % (ref 24–43)
MCH RBC QN AUTO: 28.5 PG (ref 25.2–33.5)
MCHC RBC AUTO-ENTMCNC: 32.7 G/DL (ref 28.4–34.8)
MCV RBC AUTO: 87 FL (ref 82.6–102.9)
MONOCYTES NFR BLD: 0.34 K/UL (ref 0.1–1.2)
MONOCYTES NFR BLD: 13 % (ref 3–12)
MORPHOLOGY: NORMAL
NEUTROPHILS NFR BLD: 35 % (ref 36–65)
NEUTS SEG NFR BLD: 0.91 K/UL (ref 1.5–8.1)
NRBC BLD-RTO: 0 PER 100 WBC
PLATELET # BLD AUTO: 122 K/UL (ref 138–453)
PMV BLD AUTO: 9.5 FL (ref 8.1–13.5)
RBC # BLD AUTO: 4.53 M/UL (ref 4.21–5.77)
WBC OTHER # BLD: 2.6 K/UL (ref 3.5–11.3)

## 2024-11-08 PROCEDURE — 36415 COLL VENOUS BLD VENIPUNCTURE: CPT

## 2024-11-08 PROCEDURE — P9604 ONE-WAY ALLOW PRORATED TRIP: HCPCS

## 2024-11-08 PROCEDURE — 85025 COMPLETE CBC W/AUTO DIFF WBC: CPT

## 2024-11-12 ENCOUNTER — HOSPITAL ENCOUNTER (OUTPATIENT)
Age: 29
Setting detail: SPECIMEN
Discharge: HOME | End: 2024-11-12
Payer: MEDICARE

## 2024-11-12 LAB
BASOPHILS # BLD: <0.03 K/UL (ref 0–0.2)
BASOPHILS NFR BLD: 0 % (ref 0–2)
EOSINOPHIL # BLD: <0.03 K/UL (ref 0–0.44)
EOSINOPHILS RELATIVE PERCENT: 0 % (ref 1–4)
ERYTHROCYTE [DISTWIDTH] IN BLOOD BY AUTOMATED COUNT: 13.5 % (ref 11.8–14.4)
HCT VFR BLD AUTO: 41.7 % (ref 40.7–50.3)
HGB BLD-MCNC: 13.5 G/DL (ref 13–17)
IMM GRANULOCYTES # BLD AUTO: <0.03 K/UL (ref 0–0.3)
IMM GRANULOCYTES NFR BLD: 0 %
LYMPHOCYTES NFR BLD: 1.63 K/UL (ref 1.1–3.7)
LYMPHOCYTES RELATIVE PERCENT: 48 % (ref 24–43)
MCH RBC QN AUTO: 28.5 PG (ref 25.2–33.5)
MCHC RBC AUTO-ENTMCNC: 32.4 G/DL (ref 28.4–34.8)
MCV RBC AUTO: 88.2 FL (ref 82.6–102.9)
MONOCYTES NFR BLD: 0.56 K/UL (ref 0.1–1.2)
MONOCYTES NFR BLD: 17 % (ref 3–12)
NEUTROPHILS NFR BLD: 35 % (ref 36–65)
NEUTS SEG NFR BLD: 1.18 K/UL (ref 1.5–8.1)
NRBC BLD-RTO: 0 PER 100 WBC
PLATELET # BLD AUTO: 123 K/UL (ref 138–453)
PMV BLD AUTO: 9.8 FL (ref 8.1–13.5)
RBC # BLD AUTO: 4.73 M/UL (ref 4.21–5.77)
WBC OTHER # BLD: 3.2 K/UL (ref 3.5–11.3)

## 2024-11-12 PROCEDURE — 85025 COMPLETE CBC W/AUTO DIFF WBC: CPT

## 2024-11-12 PROCEDURE — P9604 ONE-WAY ALLOW PRORATED TRIP: HCPCS

## 2024-11-12 PROCEDURE — 36415 COLL VENOUS BLD VENIPUNCTURE: CPT

## 2024-11-12 PROCEDURE — 85027 COMPLETE CBC AUTOMATED: CPT

## 2024-11-15 ENCOUNTER — HOSPITAL ENCOUNTER (OUTPATIENT)
Age: 29
Setting detail: SPECIMEN
Discharge: HOME | End: 2024-11-15
Payer: MEDICARE

## 2024-11-15 LAB
BASOPHILS # BLD: <0.03 K/UL (ref 0–0.2)
BASOPHILS NFR BLD: 0 % (ref 0–2)
EOSINOPHIL # BLD: <0.03 K/UL (ref 0–0.44)
EOSINOPHILS RELATIVE PERCENT: 0 % (ref 1–4)
ERYTHROCYTE [DISTWIDTH] IN BLOOD BY AUTOMATED COUNT: 13.6 % (ref 11.8–14.4)
HCT VFR BLD AUTO: 41.1 % (ref 40.7–50.3)
HGB BLD-MCNC: 13.3 G/DL (ref 13–17)
IMM GRANULOCYTES # BLD AUTO: <0.03 K/UL (ref 0–0.3)
IMM GRANULOCYTES NFR BLD: 0 %
LYMPHOCYTES NFR BLD: 1.4 K/UL (ref 1.1–3.7)
LYMPHOCYTES RELATIVE PERCENT: 48 % (ref 24–43)
MCH RBC QN AUTO: 27.9 PG (ref 25.2–33.5)
MCHC RBC AUTO-ENTMCNC: 32.4 G/DL (ref 28.4–34.8)
MCV RBC AUTO: 86.3 FL (ref 82.6–102.9)
MONOCYTES NFR BLD: 0.37 K/UL (ref 0.1–1.2)
MONOCYTES NFR BLD: 13 % (ref 3–12)
NEUTROPHILS NFR BLD: 39 % (ref 36–65)
NEUTS SEG NFR BLD: 1.14 K/UL (ref 1.5–8.1)
NRBC BLD-RTO: 0 PER 100 WBC
PLATELET # BLD AUTO: ABNORMAL K/UL (ref 138–453)
PLATELET, FLUORESCENCE: 110 K/UL (ref 138–453)
PLATELETS.RETICULATED NFR BLD AUTO: 1.6 % (ref 1.1–10.3)
RBC # BLD AUTO: 4.76 M/UL (ref 4.21–5.77)
WBC OTHER # BLD: 2.9 K/UL (ref 3.5–11.3)

## 2024-11-15 PROCEDURE — 85025 COMPLETE CBC W/AUTO DIFF WBC: CPT

## 2024-11-15 PROCEDURE — 36415 COLL VENOUS BLD VENIPUNCTURE: CPT

## 2024-11-18 ENCOUNTER — HOSPITAL ENCOUNTER (OUTPATIENT)
Age: 29
Discharge: HOME | End: 2024-11-18
Payer: MEDICARE

## 2024-11-18 PROCEDURE — 93005 ELECTROCARDIOGRAM TRACING: CPT | Performed by: FAMILY MEDICINE

## 2024-11-19 ENCOUNTER — HOSPITAL ENCOUNTER (OUTPATIENT)
Age: 29
Setting detail: SPECIMEN
Discharge: HOME | End: 2024-11-19
Payer: MEDICARE

## 2024-11-19 LAB
BASOPHILS # BLD: <0.03 K/UL (ref 0–0.2)
BASOPHILS NFR BLD: 0 % (ref 0–2)
EKG ATRIAL RATE: 82 BPM
EKG P AXIS: 36 DEGREES
EKG P-R INTERVAL: 154 MS
EKG Q-T INTERVAL: 340 MS
EKG QRS DURATION: 88 MS
EKG QTC CALCULATION (BAZETT): 397 MS
EKG R AXIS: 58 DEGREES
EKG T AXIS: 25 DEGREES
EKG VENTRICULAR RATE: 82 BPM
EOSINOPHIL # BLD: <0.03 K/UL (ref 0–0.44)
EOSINOPHILS RELATIVE PERCENT: 0 % (ref 1–4)
ERYTHROCYTE [DISTWIDTH] IN BLOOD BY AUTOMATED COUNT: 13.7 % (ref 11.8–14.4)
HCT VFR BLD AUTO: 43 % (ref 40.7–50.3)
HGB BLD-MCNC: 14.3 G/DL (ref 13–17)
IMM GRANULOCYTES # BLD AUTO: <0.03 K/UL (ref 0–0.3)
IMM GRANULOCYTES NFR BLD: 0 %
LYMPHOCYTES NFR BLD: 1.55 K/UL (ref 1.1–3.7)
LYMPHOCYTES RELATIVE PERCENT: 39 % (ref 24–43)
MCH RBC QN AUTO: 28.8 PG (ref 25.2–33.5)
MCHC RBC AUTO-ENTMCNC: 33.3 G/DL (ref 28.4–34.8)
MCV RBC AUTO: 86.7 FL (ref 82.6–102.9)
MONOCYTES NFR BLD: 0.49 K/UL (ref 0.1–1.2)
MONOCYTES NFR BLD: 12 % (ref 3–12)
NEUTROPHILS NFR BLD: 49 % (ref 36–65)
NEUTS SEG NFR BLD: 1.93 K/UL (ref 1.5–8.1)
NRBC BLD-RTO: 0 PER 100 WBC
PLATELET # BLD AUTO: 109 K/UL (ref 138–453)
PMV BLD AUTO: 10.2 FL (ref 8.1–13.5)
RBC # BLD AUTO: 4.96 M/UL (ref 4.21–5.77)
WBC OTHER # BLD: 4 K/UL (ref 3.5–11.3)

## 2024-11-19 PROCEDURE — 85025 COMPLETE CBC W/AUTO DIFF WBC: CPT

## 2024-11-19 PROCEDURE — 36415 COLL VENOUS BLD VENIPUNCTURE: CPT

## 2024-11-22 ENCOUNTER — HOSPITAL ENCOUNTER (OUTPATIENT)
Age: 29
Setting detail: SPECIMEN
Discharge: HOME | End: 2024-11-22
Payer: MEDICARE

## 2024-11-22 LAB
BASOPHILS # BLD: <0.03 K/UL (ref 0–0.2)
BASOPHILS NFR BLD: 0 % (ref 0–2)
EOSINOPHIL # BLD: <0.03 K/UL (ref 0–0.44)
EOSINOPHILS RELATIVE PERCENT: 0 % (ref 1–4)
ERYTHROCYTE [DISTWIDTH] IN BLOOD BY AUTOMATED COUNT: 13.6 % (ref 11.8–14.4)
HCT VFR BLD AUTO: 39.5 % (ref 40.7–50.3)
HGB BLD-MCNC: 13.6 G/DL (ref 13–17)
IMM GRANULOCYTES # BLD AUTO: <0.03 K/UL (ref 0–0.3)
IMM GRANULOCYTES NFR BLD: 0 %
LYMPHOCYTES NFR BLD: 2.26 K/UL (ref 1.1–3.7)
LYMPHOCYTES RELATIVE PERCENT: 39 % (ref 24–43)
MCH RBC QN AUTO: 28.9 PG (ref 25.2–33.5)
MCHC RBC AUTO-ENTMCNC: 34.4 G/DL (ref 28.4–34.8)
MCV RBC AUTO: 84 FL (ref 82.6–102.9)
MONOCYTES NFR BLD: 0.74 K/UL (ref 0.1–1.2)
MONOCYTES NFR BLD: 13 % (ref 3–12)
NEUTROPHILS NFR BLD: 48 % (ref 36–65)
NEUTS SEG NFR BLD: 2.75 K/UL (ref 1.5–8.1)
NRBC BLD-RTO: 0 PER 100 WBC
PLATELET # BLD AUTO: ABNORMAL K/UL (ref 138–453)
PLATELET, FLUORESCENCE: 113 K/UL (ref 138–453)
PLATELETS.RETICULATED NFR BLD AUTO: 2.2 % (ref 1.1–10.3)
RBC # BLD AUTO: 4.7 M/UL (ref 4.21–5.77)
WBC OTHER # BLD: 5.8 K/UL (ref 3.5–11.3)

## 2024-11-22 PROCEDURE — 36415 COLL VENOUS BLD VENIPUNCTURE: CPT

## 2024-11-22 PROCEDURE — 85025 COMPLETE CBC W/AUTO DIFF WBC: CPT

## 2024-11-26 ENCOUNTER — HOSPITAL ENCOUNTER (OUTPATIENT)
Age: 29
Setting detail: SPECIMEN
Discharge: HOME | End: 2024-11-26
Payer: MEDICARE

## 2024-11-26 LAB
BASOPHILS # BLD: 0 K/UL (ref 0–0.2)
BASOPHILS NFR BLD: 0 % (ref 0–2)
EOSINOPHIL # BLD: 0 K/UL (ref 0–0.44)
EOSINOPHILS RELATIVE PERCENT: 0 % (ref 1–4)
ERYTHROCYTE [DISTWIDTH] IN BLOOD BY AUTOMATED COUNT: 13.7 % (ref 11.8–14.4)
HCT VFR BLD AUTO: 39.2 % (ref 40.7–50.3)
HGB BLD-MCNC: 13.3 G/DL (ref 13–17)
IMM GRANULOCYTES # BLD AUTO: 0 K/UL (ref 0–0.3)
IMM GRANULOCYTES NFR BLD: 0 %
LYMPHOCYTES NFR BLD: 1.51 K/UL (ref 1.1–3.7)
LYMPHOCYTES RELATIVE PERCENT: 29 % (ref 24–43)
MCH RBC QN AUTO: 28.8 PG (ref 25.2–33.5)
MCHC RBC AUTO-ENTMCNC: 33.9 G/DL (ref 28.4–34.8)
MCV RBC AUTO: 84.8 FL (ref 82.6–102.9)
MONOCYTES NFR BLD: 0.52 K/UL (ref 0.1–1.2)
MONOCYTES NFR BLD: 10 % (ref 3–12)
MORPHOLOGY: ABNORMAL
MORPHOLOGY: ABNORMAL
NEUTROPHILS NFR BLD: 61 % (ref 36–65)
NEUTS SEG NFR BLD: 3.17 K/UL (ref 1.5–8.1)
NRBC BLD-RTO: 0 PER 100 WBC
PLATELET # BLD AUTO: ABNORMAL K/UL (ref 138–453)
PLATELET, FLUORESCENCE: 90 K/UL (ref 138–453)
PLATELETS.RETICULATED NFR BLD AUTO: 1.3 % (ref 1.1–10.3)
RBC # BLD AUTO: 4.62 M/UL (ref 4.21–5.77)
WBC OTHER # BLD: 5.2 K/UL (ref 3.5–11.3)

## 2024-11-26 PROCEDURE — 85025 COMPLETE CBC W/AUTO DIFF WBC: CPT

## 2024-11-26 PROCEDURE — P9604 ONE-WAY ALLOW PRORATED TRIP: HCPCS

## 2024-11-26 PROCEDURE — 36415 COLL VENOUS BLD VENIPUNCTURE: CPT

## 2024-11-29 ENCOUNTER — HOSPITAL ENCOUNTER (OUTPATIENT)
Age: 29
Setting detail: SPECIMEN
Discharge: HOME | End: 2024-11-29
Payer: MEDICARE

## 2024-11-29 LAB
BASOPHILS # BLD: <0.03 K/UL (ref 0–0.2)
BASOPHILS NFR BLD: 0 % (ref 0–2)
EOSINOPHIL # BLD: <0.03 K/UL (ref 0–0.44)
EOSINOPHILS RELATIVE PERCENT: 0 % (ref 1–4)
ERYTHROCYTE [DISTWIDTH] IN BLOOD BY AUTOMATED COUNT: 13.6 % (ref 11.8–14.4)
HCT VFR BLD AUTO: 41.9 % (ref 40.7–50.3)
HGB BLD-MCNC: 14 G/DL (ref 13–17)
IMM GRANULOCYTES # BLD AUTO: <0.03 K/UL (ref 0–0.3)
IMM GRANULOCYTES NFR BLD: 0 %
LYMPHOCYTES NFR BLD: 2.1 K/UL (ref 1.1–3.7)
LYMPHOCYTES RELATIVE PERCENT: 38 % (ref 24–43)
MCH RBC QN AUTO: 28.6 PG (ref 25.2–33.5)
MCHC RBC AUTO-ENTMCNC: 33.4 G/DL (ref 28.4–34.8)
MCV RBC AUTO: 85.7 FL (ref 82.6–102.9)
MONOCYTES NFR BLD: 0.5 K/UL (ref 0.1–1.2)
MONOCYTES NFR BLD: 9 % (ref 3–12)
NEUTROPHILS NFR BLD: 53 % (ref 36–65)
NEUTS SEG NFR BLD: 2.92 K/UL (ref 1.5–8.1)
NRBC BLD-RTO: 0 PER 100 WBC
PLATELET # BLD AUTO: 116 K/UL (ref 138–453)
PMV BLD AUTO: 9.7 FL (ref 8.1–13.5)
RBC # BLD AUTO: 4.89 M/UL (ref 4.21–5.77)
WBC OTHER # BLD: 5.5 K/UL (ref 3.5–11.3)

## 2024-11-29 PROCEDURE — 36415 COLL VENOUS BLD VENIPUNCTURE: CPT

## 2024-11-29 PROCEDURE — 85025 COMPLETE CBC W/AUTO DIFF WBC: CPT

## 2024-11-29 PROCEDURE — P9603 ONE-WAY ALLOW PRORATED MILES: HCPCS

## 2024-12-03 ENCOUNTER — HOSPITAL ENCOUNTER (OUTPATIENT)
Age: 29
Setting detail: SPECIMEN
Discharge: HOME | End: 2024-12-03
Payer: MEDICARE

## 2024-12-03 ENCOUNTER — OFFICE VISIT (OUTPATIENT)
Dept: UROLOGY | Age: 29
End: 2024-12-03
Payer: MEDICARE

## 2024-12-03 DIAGNOSIS — K59.00 CONSTIPATION, UNSPECIFIED CONSTIPATION TYPE: ICD-10-CM

## 2024-12-03 DIAGNOSIS — N39.42 URINARY INCONTINENCE WITHOUT SENSORY AWARENESS: Primary | ICD-10-CM

## 2024-12-03 DIAGNOSIS — R33.9 INCOMPLETE BLADDER EMPTYING: ICD-10-CM

## 2024-12-03 PROCEDURE — 99308 SBSQ NF CARE LOW MDM 20: CPT | Performed by: NURSE PRACTITIONER

## 2024-12-03 PROCEDURE — 85025 COMPLETE CBC W/AUTO DIFF WBC: CPT

## 2024-12-03 PROCEDURE — 51798 US URINE CAPACITY MEASURE: CPT | Performed by: NURSE PRACTITIONER

## 2024-12-03 NOTE — PROGRESS NOTES
He is alert and oriented to person.         History  6/2023 Referral for urinary issues.  He is nonverbal.  History of aggressive behavior.  Staff is helping us with the visit today.  Has incontinence.  He also at times void every 2 hours for staff.  There is times where he will urinate in inappropriate places, like under the sink.  Staff reports he is also defecating in inappropriate places.  He only has a bowel movement every 3-4 days.  If notes when he does attempt to urinate he does seem like he is straining to void.  He is on oxybutynin daily.  He voided 1 hour ago.  PVR is 439 mL.  Staff denies any evidence of hematuria.  Staff denies any self-mutilation.  Staff denies any known history of sexual assault.   Stopped oxybutynin     Started flomax and miralax     9/2023 started myrbetriq    12/2023 He is no longer voiding or defecating in public areas.  He is urinating in his bedroom on the floor by his bed. Staff does not feel like he is straining to void any longer.    Increased myrbetriq to 50mg    8/2024 admitted for generalized tonic-clonic seizure.  During hospitalization developed severe constipation and bowel obstruction.  Due to constipation and bowel obstruction he did develop urinary retention.  Frias placed for greater than side 800 mL. Removed at discharge    9/2024 voiding without difficulty    Today  Here today at [facility] to follow-up for inappropriate voiding and constipation.  He has been voiding without difficulty.  He is no longer urinating in the common areas.  He is voiding in his room, but not on a toilet.  This is an improvement.  There has been no hematuria. Staff is assisting with visit.  He is nonverbal. He is having a daily bowel movement. Random bladder scan is 304ml, but staff states he has not urinated today at all. Visit was completed at 1130am    Plan  Attempt timed voids every 2-3 hours while awake if his behaviors allow    Continue Myrbetriq    Continue Flomax    Continue

## 2024-12-04 ENCOUNTER — HOSPITAL ENCOUNTER (OUTPATIENT)
Age: 29
Setting detail: SPECIMEN
Discharge: HOME | End: 2024-12-04

## 2024-12-04 LAB
BASOPHILS # BLD: <0.03 K/UL (ref 0–0.2)
BASOPHILS NFR BLD: 0 % (ref 0–2)
EOSINOPHIL # BLD: <0.03 K/UL (ref 0–0.44)
EOSINOPHILS RELATIVE PERCENT: 0 % (ref 1–4)
ERYTHROCYTE [DISTWIDTH] IN BLOOD BY AUTOMATED COUNT: 13.9 % (ref 11.8–14.4)
HCT VFR BLD AUTO: 39.7 % (ref 40.7–50.3)
HGB BLD-MCNC: 13.6 G/DL (ref 13–17)
IMM GRANULOCYTES # BLD AUTO: <0.03 K/UL (ref 0–0.3)
IMM GRANULOCYTES NFR BLD: 0 %
LYMPHOCYTES NFR BLD: 2.33 K/UL (ref 1.1–3.7)
LYMPHOCYTES RELATIVE PERCENT: 35 % (ref 24–43)
MCH RBC QN AUTO: 29.1 PG (ref 25.2–33.5)
MCHC RBC AUTO-ENTMCNC: 34.3 G/DL (ref 28.4–34.8)
MCV RBC AUTO: 84.8 FL (ref 82.6–102.9)
MONOCYTES NFR BLD: 0.85 K/UL (ref 0.1–1.2)
MONOCYTES NFR BLD: 13 % (ref 3–12)
NEUTROPHILS NFR BLD: 53 % (ref 36–65)
NEUTS SEG NFR BLD: 3.54 K/UL (ref 1.5–8.1)
NRBC BLD-RTO: 0 PER 100 WBC
PLATELET # BLD AUTO: ABNORMAL K/UL (ref 138–453)
PLATELET, FLUORESCENCE: 125 K/UL (ref 138–453)
PLATELETS.RETICULATED NFR BLD AUTO: 1.2 % (ref 1.1–10.3)
RBC # BLD AUTO: 4.68 M/UL (ref 4.21–5.77)
WBC OTHER # BLD: 6.7 K/UL (ref 3.5–11.3)

## 2024-12-06 ENCOUNTER — HOSPITAL ENCOUNTER (OUTPATIENT)
Age: 29
Setting detail: SPECIMEN
Discharge: HOME | End: 2024-12-06
Payer: MEDICARE

## 2024-12-06 LAB
BASOPHILS # BLD: <0.03 K/UL (ref 0–0.2)
BASOPHILS NFR BLD: 0 % (ref 0–2)
EOSINOPHIL # BLD: <0.03 K/UL (ref 0–0.44)
EOSINOPHILS RELATIVE PERCENT: 0 % (ref 1–4)
ERYTHROCYTE [DISTWIDTH] IN BLOOD BY AUTOMATED COUNT: 14.1 % (ref 11.8–14.4)
HCT VFR BLD AUTO: 40.8 % (ref 40.7–50.3)
HGB BLD-MCNC: 14 G/DL (ref 13–17)
IMM GRANULOCYTES # BLD AUTO: <0.03 K/UL (ref 0–0.3)
IMM GRANULOCYTES NFR BLD: 0 %
LYMPHOCYTES NFR BLD: 1.78 K/UL (ref 1.1–3.7)
LYMPHOCYTES RELATIVE PERCENT: 33 % (ref 24–43)
MCH RBC QN AUTO: 29.3 PG (ref 25.2–33.5)
MCHC RBC AUTO-ENTMCNC: 34.3 G/DL (ref 28.4–34.8)
MCV RBC AUTO: 85.4 FL (ref 82.6–102.9)
MONOCYTES NFR BLD: 0.62 K/UL (ref 0.1–1.2)
MONOCYTES NFR BLD: 12 % (ref 3–12)
NEUTROPHILS NFR BLD: 55 % (ref 36–65)
NEUTS SEG NFR BLD: 2.95 K/UL (ref 1.5–8.1)
NRBC BLD-RTO: 0 PER 100 WBC
PLATELET # BLD AUTO: 119 K/UL (ref 138–453)
PMV BLD AUTO: 9.4 FL (ref 8.1–13.5)
RBC # BLD AUTO: 4.78 M/UL (ref 4.21–5.77)
WBC OTHER # BLD: 5.4 K/UL (ref 3.5–11.3)

## 2024-12-06 PROCEDURE — 85025 COMPLETE CBC W/AUTO DIFF WBC: CPT

## 2024-12-06 PROCEDURE — P9603 ONE-WAY ALLOW PRORATED MILES: HCPCS

## 2024-12-06 PROCEDURE — 36415 COLL VENOUS BLD VENIPUNCTURE: CPT

## 2024-12-10 ENCOUNTER — HOSPITAL ENCOUNTER (OUTPATIENT)
Age: 29
Setting detail: SPECIMEN
Discharge: HOME | End: 2024-12-10
Payer: MEDICARE

## 2024-12-10 LAB
BASOPHILS # BLD: <0.03 K/UL (ref 0–0.2)
BASOPHILS NFR BLD: 0 % (ref 0–2)
EOSINOPHIL # BLD: <0.03 K/UL (ref 0–0.44)
EOSINOPHILS RELATIVE PERCENT: 0 % (ref 1–4)
ERYTHROCYTE [DISTWIDTH] IN BLOOD BY AUTOMATED COUNT: 14.5 % (ref 11.8–14.4)
HCT VFR BLD AUTO: 42.2 % (ref 40.7–50.3)
HGB BLD-MCNC: 14 G/DL (ref 13–17)
IMM GRANULOCYTES # BLD AUTO: <0.03 K/UL (ref 0–0.3)
IMM GRANULOCYTES NFR BLD: 0 %
LYMPHOCYTES NFR BLD: 1.89 K/UL (ref 1.1–3.7)
LYMPHOCYTES RELATIVE PERCENT: 36 % (ref 24–43)
MCH RBC QN AUTO: 28.5 PG (ref 25.2–33.5)
MCHC RBC AUTO-ENTMCNC: 33.2 G/DL (ref 28.4–34.8)
MCV RBC AUTO: 85.8 FL (ref 82.6–102.9)
MONOCYTES NFR BLD: 0.55 K/UL (ref 0.1–1.2)
MONOCYTES NFR BLD: 11 % (ref 3–12)
NEUTROPHILS NFR BLD: 53 % (ref 36–65)
NEUTS SEG NFR BLD: 2.78 K/UL (ref 1.5–8.1)
NRBC BLD-RTO: 0 PER 100 WBC
PLATELET # BLD AUTO: 107 K/UL (ref 138–453)
PMV BLD AUTO: 9.1 FL (ref 8.1–13.5)
RBC # BLD AUTO: 4.92 M/UL (ref 4.21–5.77)
WBC OTHER # BLD: 5.2 K/UL (ref 3.5–11.3)

## 2024-12-10 PROCEDURE — 85025 COMPLETE CBC W/AUTO DIFF WBC: CPT

## 2024-12-10 PROCEDURE — P9603 ONE-WAY ALLOW PRORATED MILES: HCPCS

## 2024-12-10 PROCEDURE — 36415 COLL VENOUS BLD VENIPUNCTURE: CPT

## 2024-12-12 ENCOUNTER — HOSPITAL ENCOUNTER (OUTPATIENT)
Age: 29
Setting detail: SPECIMEN
Discharge: HOME | End: 2024-12-12
Payer: MEDICARE

## 2024-12-12 LAB
BASOPHILS # BLD: <0.03 K/UL (ref 0–0.2)
BASOPHILS NFR BLD: 0 % (ref 0–2)
EOSINOPHIL # BLD: <0.03 K/UL (ref 0–0.44)
EOSINOPHILS RELATIVE PERCENT: 0 % (ref 1–4)
ERYTHROCYTE [DISTWIDTH] IN BLOOD BY AUTOMATED COUNT: 14.3 % (ref 11.8–14.4)
HCT VFR BLD AUTO: 40.9 % (ref 40.7–50.3)
HGB BLD-MCNC: 13.6 G/DL (ref 13–17)
IMM GRANULOCYTES # BLD AUTO: <0.03 K/UL (ref 0–0.3)
IMM GRANULOCYTES NFR BLD: 0 %
LYMPHOCYTES NFR BLD: 1.66 K/UL (ref 1.1–3.7)
LYMPHOCYTES RELATIVE PERCENT: 41 % (ref 24–43)
MCH RBC QN AUTO: 28.8 PG (ref 25.2–33.5)
MCHC RBC AUTO-ENTMCNC: 33.3 G/DL (ref 28.4–34.8)
MCV RBC AUTO: 86.7 FL (ref 82.6–102.9)
MONOCYTES NFR BLD: 0.37 K/UL (ref 0.1–1.2)
MONOCYTES NFR BLD: 9 % (ref 3–12)
NEUTROPHILS NFR BLD: 50 % (ref 36–65)
NEUTS SEG NFR BLD: 2.02 K/UL (ref 1.5–8.1)
NRBC BLD-RTO: 0 PER 100 WBC
PLATELET # BLD AUTO: 111 K/UL (ref 138–453)
PMV BLD AUTO: 9.7 FL (ref 8.1–13.5)
RBC # BLD AUTO: 4.72 M/UL (ref 4.21–5.77)
WBC OTHER # BLD: 4.1 K/UL (ref 3.5–11.3)

## 2024-12-12 PROCEDURE — 85025 COMPLETE CBC W/AUTO DIFF WBC: CPT

## 2024-12-12 PROCEDURE — 36415 COLL VENOUS BLD VENIPUNCTURE: CPT

## 2024-12-12 PROCEDURE — P9604 ONE-WAY ALLOW PRORATED TRIP: HCPCS

## 2024-12-17 ENCOUNTER — HOSPITAL ENCOUNTER (OUTPATIENT)
Age: 29
Setting detail: SPECIMEN
Discharge: HOME | End: 2024-12-17
Payer: MEDICARE

## 2024-12-17 LAB
BASOPHILS # BLD: <0.03 K/UL (ref 0–0.2)
BASOPHILS NFR BLD: 0 % (ref 0–2)
EOSINOPHIL # BLD: <0.03 K/UL (ref 0–0.44)
EOSINOPHILS RELATIVE PERCENT: 0 % (ref 1–4)
ERYTHROCYTE [DISTWIDTH] IN BLOOD BY AUTOMATED COUNT: 14.4 % (ref 11.8–14.4)
HCT VFR BLD AUTO: 39.9 % (ref 40.7–50.3)
HGB BLD-MCNC: 13.5 G/DL (ref 13–17)
IMM GRANULOCYTES # BLD AUTO: <0.03 K/UL (ref 0–0.3)
IMM GRANULOCYTES NFR BLD: 0 %
LYMPHOCYTES NFR BLD: 1.28 K/UL (ref 1.1–3.7)
LYMPHOCYTES RELATIVE PERCENT: 35 % (ref 24–43)
MCH RBC QN AUTO: 28.7 PG (ref 25.2–33.5)
MCHC RBC AUTO-ENTMCNC: 33.8 G/DL (ref 28.4–34.8)
MCV RBC AUTO: 84.9 FL (ref 82.6–102.9)
MONOCYTES NFR BLD: 0.44 K/UL (ref 0.1–1.2)
MONOCYTES NFR BLD: 12 % (ref 3–12)
NEUTROPHILS NFR BLD: 53 % (ref 36–65)
NEUTS SEG NFR BLD: 1.9 K/UL (ref 1.5–8.1)
NRBC BLD-RTO: 0 PER 100 WBC
PLATELET # BLD AUTO: 119 K/UL (ref 138–453)
PMV BLD AUTO: 9 FL (ref 8.1–13.5)
RBC # BLD AUTO: 4.7 M/UL (ref 4.21–5.77)
WBC OTHER # BLD: 3.6 K/UL (ref 3.5–11.3)

## 2024-12-17 PROCEDURE — 36415 COLL VENOUS BLD VENIPUNCTURE: CPT

## 2024-12-17 PROCEDURE — 85025 COMPLETE CBC W/AUTO DIFF WBC: CPT

## 2024-12-17 PROCEDURE — P9603 ONE-WAY ALLOW PRORATED MILES: HCPCS

## 2024-12-20 ENCOUNTER — HOSPITAL ENCOUNTER (OUTPATIENT)
Age: 29
Setting detail: SPECIMEN
Discharge: HOME | End: 2024-12-20
Payer: MEDICARE

## 2024-12-20 LAB
BASOPHILS # BLD: <0.03 K/UL (ref 0–0.2)
BASOPHILS NFR BLD: 0 % (ref 0–2)
EOSINOPHIL # BLD: <0.03 K/UL (ref 0–0.44)
EOSINOPHILS RELATIVE PERCENT: 0 % (ref 1–4)
ERYTHROCYTE [DISTWIDTH] IN BLOOD BY AUTOMATED COUNT: 14.6 % (ref 11.8–14.4)
HCT VFR BLD AUTO: 41 % (ref 40.7–50.3)
HGB BLD-MCNC: 14.1 G/DL (ref 13–17)
IMM GRANULOCYTES # BLD AUTO: <0.03 K/UL (ref 0–0.3)
IMM GRANULOCYTES NFR BLD: 0 %
LYMPHOCYTES NFR BLD: 1.31 K/UL (ref 1.1–3.7)
LYMPHOCYTES RELATIVE PERCENT: 30 % (ref 24–43)
MCH RBC QN AUTO: 29 PG (ref 25.2–33.5)
MCHC RBC AUTO-ENTMCNC: 34.4 G/DL (ref 28.4–34.8)
MCV RBC AUTO: 84.4 FL (ref 82.6–102.9)
MONOCYTES NFR BLD: 0.7 K/UL (ref 0.1–1.2)
MONOCYTES NFR BLD: 16 % (ref 3–12)
NEUTROPHILS NFR BLD: 54 % (ref 36–65)
NEUTS SEG NFR BLD: 2.4 K/UL (ref 1.5–8.1)
NRBC BLD-RTO: 0 PER 100 WBC
PLATELET # BLD AUTO: 138 K/UL (ref 138–453)
PMV BLD AUTO: 9.4 FL (ref 8.1–13.5)
RBC # BLD AUTO: 4.86 M/UL (ref 4.21–5.77)
WBC OTHER # BLD: 4.4 K/UL (ref 3.5–11.3)

## 2024-12-20 PROCEDURE — 36415 COLL VENOUS BLD VENIPUNCTURE: CPT

## 2024-12-20 PROCEDURE — 85025 COMPLETE CBC W/AUTO DIFF WBC: CPT

## 2025-03-03 RX ORDER — BENZTROPINE MESYLATE 1 MG/1
1 TABLET ORAL 2 TIMES DAILY
COMMUNITY

## 2025-03-03 RX ORDER — LACOSAMIDE 150 MG/1
TABLET ORAL
COMMUNITY
Start: 2024-11-30

## 2025-03-03 RX ORDER — VALBENAZINE 60 MG/1
1 CAPSULE ORAL DAILY
COMMUNITY

## 2025-03-04 ENCOUNTER — OFFICE VISIT (OUTPATIENT)
Dept: UROLOGY | Age: 30
End: 2025-03-04
Payer: MEDICARE

## 2025-03-04 DIAGNOSIS — R33.9 INCOMPLETE BLADDER EMPTYING: ICD-10-CM

## 2025-03-04 DIAGNOSIS — N39.42 URINARY INCONTINENCE WITHOUT SENSORY AWARENESS: Primary | ICD-10-CM

## 2025-03-04 PROCEDURE — 51798 US URINE CAPACITY MEASURE: CPT | Performed by: NURSE PRACTITIONER

## 2025-03-04 PROCEDURE — 99308 SBSQ NF CARE LOW MDM 20: CPT | Performed by: NURSE PRACTITIONER

## 2025-03-04 NOTE — PROGRESS NOTES
He is alert and oriented to person.         History  6/2023 Referral for urinary issues.  He is nonverbal.  History of aggressive behavior.  Staff is helping us with the visit today.  Has incontinence.  He also at times void every 2 hours for staff.  There is times where he will urinate in inappropriate places, like under the sink.  Staff reports he is also defecating in inappropriate places.  He only has a bowel movement every 3-4 days.  If notes when he does attempt to urinate he does seem like he is straining to void.  He is on oxybutynin daily.  He voided 1 hour ago.  PVR is 439 mL.  Staff denies any evidence of hematuria.  Staff denies any self-mutilation.  Staff denies any known history of sexual assault.   Stopped oxybutynin     Started flomax and miralax     9/2023 started myrbetriq    12/2023 He is no longer voiding or defecating in public areas.  He is urinating in his bedroom on the floor by his bed. Staff does not feel like he is straining to void any longer.    Increased myrbetriq to 50mg    8/2024 admitted for generalized tonic-clonic seizure.  During hospitalization developed severe constipation and bowel obstruction.  Due to constipation and bowel obstruction he did develop urinary retention.  Frias placed for greater than side 800 mL. Removed at discharge    9/2024 voiding without difficulty    Today  Here today at [facility] to follow-up for inappropriate voiding and constipation.  He has been voiding without difficulty.  He is no longer urinating in the common areas.  He is voiding in his room, but not on a toilet.  This is an improvement.  There has been no hematuria. Staff is assisting with visit.  He is nonverbal. He is having a daily bowel movement.     Plan  Attempt timed voids every 2-3 hours while awake if his behaviors allow    Continue Myrbetriq    Continue Flomax    Continue MiraLAX    F/U in 3 months

## 2025-05-02 ENCOUNTER — HOSPITAL ENCOUNTER (OUTPATIENT)
Age: 30
Setting detail: SPECIMEN
Discharge: HOME | End: 2025-05-02
Payer: MEDICARE

## 2025-05-02 LAB
ANION GAP SERPL CALCULATED.3IONS-SCNC: 10 MMOL/L (ref 9–16)
BASOPHILS # BLD: <0.03 K/UL (ref 0–0.2)
BASOPHILS NFR BLD: 0 % (ref 0–2)
BUN SERPL-MCNC: 15 MG/DL (ref 6–20)
BUN/CREAT SERPL: 19 (ref 9–20)
CALCIUM SERPL-MCNC: 8.8 MG/DL (ref 8.6–10.4)
CHLORIDE SERPL-SCNC: 104 MMOL/L (ref 98–107)
CHOLEST SERPL-MCNC: 136 MG/DL (ref 0–199)
CHOLESTEROL/HDL RATIO: 2.7
CO2 SERPL-SCNC: 27 MMOL/L (ref 20–31)
CREAT SERPL-MCNC: 0.8 MG/DL (ref 0.7–1.2)
EOSINOPHIL # BLD: <0.03 K/UL (ref 0–0.44)
EOSINOPHILS RELATIVE PERCENT: 0 % (ref 1–4)
ERYTHROCYTE [DISTWIDTH] IN BLOOD BY AUTOMATED COUNT: 13.6 % (ref 11.8–14.4)
GFR, ESTIMATED: >90 ML/MIN/1.73M2
GLUCOSE SERPL-MCNC: 85 MG/DL (ref 74–99)
HCT VFR BLD AUTO: 39.8 % (ref 40.7–50.3)
HDLC SERPL-MCNC: 50 MG/DL
HGB BLD-MCNC: 13.2 G/DL (ref 13–17)
IMM GRANULOCYTES # BLD AUTO: <0.03 K/UL (ref 0–0.3)
IMM GRANULOCYTES NFR BLD: 0 %
LDLC SERPL CALC-MCNC: 64 MG/DL (ref 0–100)
LYMPHOCYTES NFR BLD: 1.51 K/UL (ref 1.1–3.7)
LYMPHOCYTES RELATIVE PERCENT: 28 % (ref 24–43)
MCH RBC QN AUTO: 30.1 PG (ref 25.2–33.5)
MCHC RBC AUTO-ENTMCNC: 33.2 G/DL (ref 28.4–34.8)
MCV RBC AUTO: 90.7 FL (ref 82.6–102.9)
MONOCYTES NFR BLD: 0.69 K/UL (ref 0.1–1.2)
MONOCYTES NFR BLD: 13 % (ref 3–12)
NEUTROPHILS NFR BLD: 59 % (ref 36–65)
NEUTS SEG NFR BLD: 3.17 K/UL (ref 1.5–8.1)
NRBC BLD-RTO: 0 PER 100 WBC
PLATELET # BLD AUTO: 137 K/UL (ref 138–453)
PMV BLD AUTO: 9.6 FL (ref 8.1–13.5)
POTASSIUM SERPL-SCNC: 4.1 MMOL/L (ref 3.7–5.3)
RBC # BLD AUTO: 4.39 M/UL (ref 4.21–5.77)
SODIUM SERPL-SCNC: 141 MMOL/L (ref 136–145)
TRIGL SERPL-MCNC: 109 MG/DL
VLDLC SERPL CALC-MCNC: 22 MG/DL (ref 1–30)
WBC OTHER # BLD: 5.4 K/UL (ref 3.5–11.3)

## 2025-05-02 PROCEDURE — 85025 COMPLETE CBC W/AUTO DIFF WBC: CPT

## 2025-05-02 PROCEDURE — 80061 LIPID PANEL: CPT

## 2025-05-02 PROCEDURE — 36415 COLL VENOUS BLD VENIPUNCTURE: CPT

## 2025-05-02 PROCEDURE — 80048 BASIC METABOLIC PNL TOTAL CA: CPT

## 2025-05-12 ENCOUNTER — HOSPITAL ENCOUNTER (OUTPATIENT)
Dept: NON INVASIVE DIAGNOSTICS | Age: 30
Discharge: HOME | End: 2025-05-12

## 2025-05-12 DIAGNOSIS — Z79.899 ENCOUNTER FOR LONG-TERM (CURRENT) USE OF MEDICATIONS: ICD-10-CM

## 2025-05-12 LAB
EKG ATRIAL RATE: 81 BPM
EKG P AXIS: 53 DEGREES
EKG P-R INTERVAL: 148 MS
EKG Q-T INTERVAL: 368 MS
EKG QRS DURATION: 88 MS
EKG QTC CALCULATION (BAZETT): 427 MS
EKG R AXIS: 77 DEGREES
EKG T AXIS: 35 DEGREES
EKG VENTRICULAR RATE: 81 BPM

## 2025-05-15 ENCOUNTER — HOSPITAL ENCOUNTER (OUTPATIENT)
Age: 30
Setting detail: SPECIMEN
Discharge: HOME | End: 2025-05-15
Payer: MEDICARE

## 2025-05-15 LAB — VALPROATE SERPL-MCNC: 89 UG/ML (ref 50–125)

## 2025-05-15 PROCEDURE — 80164 ASSAY DIPROPYLACETIC ACD TOT: CPT

## 2025-05-15 PROCEDURE — 36415 COLL VENOUS BLD VENIPUNCTURE: CPT

## 2025-05-15 PROCEDURE — P9604 ONE-WAY ALLOW PRORATED TRIP: HCPCS

## 2025-06-03 ENCOUNTER — OFFICE VISIT (OUTPATIENT)
Dept: UROLOGY | Age: 30
End: 2025-06-03
Payer: MEDICARE

## 2025-06-03 DIAGNOSIS — N39.42 URINARY INCONTINENCE WITHOUT SENSORY AWARENESS: Primary | ICD-10-CM

## 2025-06-03 DIAGNOSIS — R33.9 INCOMPLETE BLADDER EMPTYING: ICD-10-CM

## 2025-06-03 DIAGNOSIS — K59.00 CONSTIPATION, UNSPECIFIED CONSTIPATION TYPE: ICD-10-CM

## 2025-06-03 PROCEDURE — 51798 US URINE CAPACITY MEASURE: CPT | Performed by: NURSE PRACTITIONER

## 2025-06-03 PROCEDURE — 99308 SBSQ NF CARE LOW MDM 20: CPT | Performed by: NURSE PRACTITIONER

## 2025-06-04 RX ORDER — OLANZAPINE 5 MG/1
5 TABLET, FILM COATED ORAL 2 TIMES DAILY
COMMUNITY

## 2025-06-04 RX ORDER — LACOSAMIDE 200 MG/1
200 TABLET ORAL DAILY
COMMUNITY

## 2025-09-02 ENCOUNTER — OFFICE VISIT (OUTPATIENT)
Dept: UROLOGY | Age: 30
End: 2025-09-02
Payer: MEDICARE

## 2025-09-02 DIAGNOSIS — N39.42 URINARY INCONTINENCE WITHOUT SENSORY AWARENESS: Primary | ICD-10-CM

## 2025-09-02 DIAGNOSIS — K59.00 CONSTIPATION, UNSPECIFIED CONSTIPATION TYPE: ICD-10-CM

## 2025-09-02 PROCEDURE — 99308 SBSQ NF CARE LOW MDM 20: CPT | Performed by: NURSE PRACTITIONER

## 2025-09-02 PROCEDURE — 51798 US URINE CAPACITY MEASURE: CPT | Performed by: NURSE PRACTITIONER

## 2025-09-02 RX ORDER — SELENIUM SULFIDE 2.5 MG/100ML
LOTION TOPICAL DAILY PRN
COMMUNITY

## 2025-09-02 RX ORDER — DEUTETRABENAZINE 12 MG/1
TABLET, COATED ORAL
COMMUNITY
Start: 2025-08-16